# Patient Record
Sex: MALE | Race: WHITE | NOT HISPANIC OR LATINO | Employment: FULL TIME | URBAN - METROPOLITAN AREA
[De-identification: names, ages, dates, MRNs, and addresses within clinical notes are randomized per-mention and may not be internally consistent; named-entity substitution may affect disease eponyms.]

---

## 2017-09-07 ENCOUNTER — OFFICE VISIT (OUTPATIENT)
Dept: FAMILY MEDICINE | Facility: CLINIC | Age: 27
End: 2017-09-07
Payer: COMMERCIAL

## 2017-09-07 VITALS
HEART RATE: 61 BPM | HEIGHT: 72 IN | DIASTOLIC BLOOD PRESSURE: 80 MMHG | OXYGEN SATURATION: 98 % | SYSTOLIC BLOOD PRESSURE: 110 MMHG | BODY MASS INDEX: 31.72 KG/M2 | WEIGHT: 234.19 LBS

## 2017-09-07 DIAGNOSIS — Z00.00 ENCOUNTER FOR PREVENTIVE HEALTH EXAMINATION: Primary | ICD-10-CM

## 2017-09-07 PROCEDURE — 99395 PREV VISIT EST AGE 18-39: CPT | Mod: S$GLB,,,

## 2017-09-07 PROCEDURE — 99999 PR PBB SHADOW E&M-EST. PATIENT-LVL III: CPT | Mod: PBBFAC,,,

## 2017-09-07 NOTE — PROGRESS NOTES
Subjective:       Patient ID: Marshal Baer is a 27 y.o. male.    Chief Complaint: Annual Exam    HPI  View All      11 - Recommended (A, B)    Grade Title Risk Info. Details   Low  HIV: Screening - Adolescents and Adults     110/80 High Blood Pressure: Screening and Home Monitoring -- Adults     Low  Syphilis: Screening --Asymptomatic, nonpregnant adults and adolescents who are at increased risk for syphilis infection     Denies  Alcohol Misuse: Screening and Behavioral Counseling Interventions in Primary Care -- Adults     Denies  Depression: Screening -- General adult population, including pregnant and postpartum women     Plan to start  Healthful Diet and Physical Activity for CVD Disease Prevention: Counseling -- Adults with CVD Risk Factors     Low  Hepatitis B: Screening -- Nonpregnant Adolescents and Adults At High Risk     Low  Hepatitis C Virus Infection: Screening--Adults at High Risk and Adults born between 1945 and 1965     UTD negative  Latent Tuberculosis Infection: Screening -- Asymptomatic adults at increased risk for infection     Stage 1  Obesity: Screening for and Management of-- All Adults       Low  Sexually Transmitted Infections: Behavioral Counseling -- Sexually Active Adolescents and Adults         Review of Systems   Constitutional: Negative.  Negative for activity change, appetite change, fatigue and unexpected weight change.   HENT: Positive for hearing loss.         Right ear    Eyes: Negative.    Respiratory: Negative.    Cardiovascular: Negative.    Gastrointestinal: Negative.    Endocrine: Negative.    Genitourinary: Negative.    Musculoskeletal: Negative.    Skin: Negative.    Allergic/Immunologic: Negative.    Neurological: Negative.    Hematological: Negative.    Psychiatric/Behavioral: Negative.    All other systems reviewed and are negative.      Objective:      Vitals:    09/07/17 0705   BP: 110/80   Pulse: 61     Physical Exam   Constitutional: He is oriented to  person, place, and time. He appears well-developed and well-nourished. He is cooperative. No distress.   HENT:   Head: Normocephalic and atraumatic.   Right Ear: Hearing, tympanic membrane, external ear and ear canal normal.   Left Ear: Hearing, external ear and ear canal normal.   Nose: Nose normal.   Mouth/Throat: Oropharynx is clear and moist.   Eyes: Conjunctivae are normal. Pupils are equal, round, and reactive to light.   Neck: Normal range of motion. Neck supple. No thyromegaly present.   Cardiovascular: Normal rate, regular rhythm, normal heart sounds and intact distal pulses.    Pulmonary/Chest: Effort normal and breath sounds normal. No respiratory distress.   Musculoskeletal: Normal range of motion. He exhibits no edema or tenderness.   Lymphadenopathy:     He has no cervical adenopathy.   Neurological: He is alert and oriented to person, place, and time. He has normal strength. Coordination and gait normal.   Skin: Skin is warm, dry and intact. No cyanosis. Nails show no clubbing.   Psychiatric: He has a normal mood and affect. His speech is normal and behavior is normal. Judgment and thought content normal. Cognition and memory are normal.   Vitals reviewed.      Assessment:       1. Encounter for preventive health examination        Plan:       Encounter for preventive health examination      Return in about 1 year (around 9/7/2018).

## 2018-01-30 ENCOUNTER — PATIENT MESSAGE (OUTPATIENT)
Dept: ADMINISTRATIVE | Facility: OTHER | Age: 28
End: 2018-01-30

## 2018-02-03 ENCOUNTER — PATIENT MESSAGE (OUTPATIENT)
Dept: FAMILY MEDICINE | Facility: CLINIC | Age: 28
End: 2018-02-03

## 2018-02-07 ENCOUNTER — TELEPHONE (OUTPATIENT)
Dept: FAMILY MEDICINE | Facility: CLINIC | Age: 28
End: 2018-02-07

## 2018-02-07 NOTE — TELEPHONE ENCOUNTER
----- Message from Ana Gomes sent at 2/7/2018  8:18 AM CST -----  Contact: Wife. [phone]  Patient's wife would like to speak with you about the patient having a stomach bug and needs an excuse for work. Please advise

## 2018-02-27 ENCOUNTER — TELEPHONE (OUTPATIENT)
Dept: FAMILY MEDICINE | Facility: CLINIC | Age: 28
End: 2018-02-27

## 2018-02-27 NOTE — TELEPHONE ENCOUNTER
----- Message from Ana Gomes sent at 2/27/2018  8:37 AM CST -----  Contact: Self. [phone]  Patient would like to speak with you about if the paperwork he dropped off yesterday is filled out. Please advise

## 2018-07-03 ENCOUNTER — OFFICE VISIT (OUTPATIENT)
Dept: FAMILY MEDICINE | Facility: CLINIC | Age: 28
End: 2018-07-03
Payer: COMMERCIAL

## 2018-07-03 VITALS
HEART RATE: 68 BPM | TEMPERATURE: 98 F | BODY MASS INDEX: 32.57 KG/M2 | WEIGHT: 240.5 LBS | DIASTOLIC BLOOD PRESSURE: 72 MMHG | RESPIRATION RATE: 16 BRPM | SYSTOLIC BLOOD PRESSURE: 114 MMHG | OXYGEN SATURATION: 98 % | HEIGHT: 72 IN

## 2018-07-03 DIAGNOSIS — R10.11 RIGHT UPPER QUADRANT ABDOMINAL PAIN: Primary | ICD-10-CM

## 2018-07-03 DIAGNOSIS — R11.2 NON-INTRACTABLE VOMITING WITH NAUSEA, UNSPECIFIED VOMITING TYPE: ICD-10-CM

## 2018-07-03 PROCEDURE — 99214 OFFICE O/P EST MOD 30 MIN: CPT | Mod: S$GLB,,, | Performed by: INTERNAL MEDICINE

## 2018-07-03 PROCEDURE — 3008F BODY MASS INDEX DOCD: CPT | Mod: CPTII,S$GLB,, | Performed by: INTERNAL MEDICINE

## 2018-07-03 PROCEDURE — 99999 PR PBB SHADOW E&M-EST. PATIENT-LVL IV: CPT | Mod: PBBFAC,,, | Performed by: INTERNAL MEDICINE

## 2018-07-03 RX ORDER — AMOXICILLIN AND CLAVULANATE POTASSIUM 875; 125 MG/1; MG/1
1 TABLET, FILM COATED ORAL 2 TIMES DAILY
Qty: 20 TABLET | Refills: 0 | Status: SHIPPED | OUTPATIENT
Start: 2018-07-03 | End: 2018-07-10

## 2018-07-03 NOTE — PATIENT INSTRUCTIONS
I suspect that you  Might have cholecystitis. We will treat with Augmentin and get testing to further evaluate your pain. I have excused you from work until Monday so that we can finish our evaluation.

## 2018-07-03 NOTE — PROGRESS NOTES
Subjective:       Patient ID: Marshal Baer is a 28 y.o. male.    Chief Complaint: Nausea; Diarrhea; and Abdominal Pain     I have reviewed the PMH,  and  for this patient. This is a new patient to me. He presents today with N/V/D x 5 days. HE has also been having RUQ abdominal pain x 6 months. None of his family has gotten sick. He was seen in ER and treated with zofran. No labs were done. He has been able to eat some solid food but he gets sick whenever he eats.       Nausea   This is a new problem. The current episode started in the past 7 days. The problem occurs constantly. The problem has been gradually improving. Associated symptoms include abdominal pain, nausea and vomiting. Pertinent negatives include no anorexia, arthralgias, change in bowel habit, chest pain, chills, congestion, coughing, fatigue, fever, headaches, joint swelling, myalgias, neck pain, numbness, rash, sore throat, swollen glands, urinary symptoms, vertigo or visual change. Nothing aggravates the symptoms. He has tried nothing for the symptoms.   Abdominal Pain   This is a chronic problem. The current episode started more than 1 month ago. The onset quality is gradual. The problem occurs intermittently. The problem has been rapidly worsening. The pain is located in the RUQ. The pain is at a severity of 6/10. The pain is moderate. The quality of the pain is cramping. The abdominal pain does not radiate. Associated symptoms include diarrhea, nausea and vomiting. Pertinent negatives include no anorexia, arthralgias, belching, constipation, dysuria, fever, flatus, frequency, headaches, hematochezia, hematuria, melena, myalgias or weight loss. The pain is aggravated by eating. The pain is relieved by nothing. Treatments tried: zofran. The treatment provided mild relief.     Review of Systems   Constitutional: Negative for chills, fatigue, fever and weight loss.   HENT: Negative for congestion, ear discharge, ear pain, rhinorrhea  and sore throat.    Eyes: Negative for discharge, redness and itching.   Respiratory: Negative for cough, chest tightness, shortness of breath and wheezing.    Cardiovascular: Negative for chest pain, palpitations and leg swelling.   Gastrointestinal: Positive for abdominal pain, diarrhea, nausea and vomiting. Negative for anorexia, blood in stool, change in bowel habit, constipation, flatus, hematochezia and melena.   Endocrine: Negative for cold intolerance and heat intolerance.   Genitourinary: Negative for dysuria, flank pain, frequency and hematuria.   Musculoskeletal: Negative for arthralgias, back pain, joint swelling, myalgias and neck pain.   Skin: Negative for color change and rash.   Neurological: Negative for dizziness, vertigo, tremors, numbness and headaches.   Psychiatric/Behavioral: Negative for dysphoric mood and sleep disturbance. The patient is not nervous/anxious.        Objective:      Physical Exam   Constitutional: He appears well-developed and well-nourished.   HENT:   Head: Normocephalic and atraumatic.   Right Ear: External ear normal. Tympanic membrane is not erythematous. No middle ear effusion.   Left Ear: External ear normal. Tympanic membrane is not erythematous.  No middle ear effusion.   Mouth/Throat: Posterior oropharyngeal erythema present. No posterior oropharyngeal edema. No tonsillar exudate.   Eyes: Conjunctivae and EOM are normal. Pupils are equal, round, and reactive to light.   Neck: No thyromegaly present.   Cardiovascular: Normal rate, regular rhythm, normal heart sounds and intact distal pulses.    No murmur heard.  Pulmonary/Chest: Effort normal and breath sounds normal. He has no wheezes.   Abdominal: Soft. He exhibits no distension. Bowel sounds are increased. There is no tenderness.   Musculoskeletal: He exhibits no edema or tenderness.   Lymphadenopathy:     He has no cervical adenopathy.   Neurological: He displays normal reflexes. No cranial nerve deficit.   Skin:  Skin is warm and dry. No rash noted.   Psychiatric: He has a normal mood and affect. His behavior is normal.       Assessment and Plan:     Problem List Items Addressed This Visit     None      Visit Diagnoses     Right upper quadrant abdominal pain    -  Primary - We will order ultrasound and check labs for possible cholecystitis. Off work til Monday.     Relevant Orders    Comprehensive metabolic panel    CBC auto differential    Lipid panel    Amylase    US Abdomen Limited    Non-intractable vomiting with nausea, unspecified vomiting type    - Continue zofran and follow bland diet. We will check labs.       BMI 32.0-32.9,adult    - work on diet.

## 2018-07-03 NOTE — LETTER
July 3, 2018      [place] [address]  [place] [address]  Phone: [phone]  Fax: [phone]       Patient: Marshal Baer   YOB: 1990  Date of Visit: 07/03/2018    To Whom It May Concern:    Madai Baer  was at [facility] on 07/03/2018. He may return to work/school on 7/9/18 with no restrictions. If you have any questions or concerns, or if I can be of further assistance, please do not hesitate to contact me.    Sincerely,    Priscilla Turcios MD

## 2018-07-08 DIAGNOSIS — R79.89 ABNORMAL LIVER FUNCTION TEST: Primary | ICD-10-CM

## 2018-07-09 ENCOUNTER — PATIENT MESSAGE (OUTPATIENT)
Dept: FAMILY MEDICINE | Facility: CLINIC | Age: 28
End: 2018-07-09

## 2018-07-09 ENCOUNTER — TELEPHONE (OUTPATIENT)
Dept: FAMILY MEDICINE | Facility: CLINIC | Age: 28
End: 2018-07-09

## 2018-07-09 NOTE — TELEPHONE ENCOUNTER
----- Message from Priscilla Turcios MD sent at 7/8/2018 10:19 PM CDT -----  Cholesterol looks good. Blood chemistries were good, but one of the liver tests is high. We need a hepatitis panel and to repeat his liver tests since the ultrasound was normal. I have placed orders.

## 2018-07-10 ENCOUNTER — PATIENT MESSAGE (OUTPATIENT)
Dept: FAMILY MEDICINE | Facility: CLINIC | Age: 28
End: 2018-07-10

## 2018-07-10 ENCOUNTER — HOSPITAL ENCOUNTER (EMERGENCY)
Facility: HOSPITAL | Age: 28
Discharge: HOME | End: 2018-07-11
Attending: EMERGENCY MEDICINE
Payer: COMMERCIAL

## 2018-07-10 ENCOUNTER — TELEPHONE (OUTPATIENT)
Dept: FAMILY MEDICINE | Facility: CLINIC | Age: 28
End: 2018-07-10

## 2018-07-10 DIAGNOSIS — R74.8 ELEVATED LIVER ENZYMES: Primary | ICD-10-CM

## 2018-07-10 LAB
ALBUMIN SERPL BCP-MCNC: 4.4 G/DL
ALP SERPL-CCNC: 77 U/L
ALT SERPL W/O P-5'-P-CCNC: 127 U/L
ANION GAP SERPL CALC-SCNC: 9 MMOL/L
AST SERPL-CCNC: 58 U/L
BASOPHILS # BLD AUTO: 0.05 K/UL
BASOPHILS NFR BLD: 0.6 %
BILIRUB SERPL-MCNC: 0.4 MG/DL
BUN SERPL-MCNC: 13 MG/DL
CALCIUM SERPL-MCNC: 9.3 MG/DL
CHLORIDE SERPL-SCNC: 103 MMOL/L
CO2 SERPL-SCNC: 28 MMOL/L
CREAT SERPL-MCNC: 0.8 MG/DL
DIFFERENTIAL METHOD: ABNORMAL
EOSINOPHIL # BLD AUTO: 0.4 K/UL
EOSINOPHIL NFR BLD: 4.3 %
ERYTHROCYTE [DISTWIDTH] IN BLOOD BY AUTOMATED COUNT: 12.4 %
EST. GFR  (AFRICAN AMERICAN): >60 ML/MIN/1.73 M^2
EST. GFR  (NON AFRICAN AMERICAN): >60 ML/MIN/1.73 M^2
GLUCOSE SERPL-MCNC: 90 MG/DL
HCT VFR BLD AUTO: 48.9 %
HGB BLD-MCNC: 16.8 G/DL
IMM GRANULOCYTES # BLD AUTO: 0.06 K/UL
IMM GRANULOCYTES NFR BLD AUTO: 0.7 %
LYMPHOCYTES # BLD AUTO: 3.2 K/UL
LYMPHOCYTES NFR BLD: 39.9 %
MCH RBC QN AUTO: 30.1 PG
MCHC RBC AUTO-ENTMCNC: 34.4 G/DL
MCV RBC AUTO: 88 FL
MONOCYTES # BLD AUTO: 0.6 K/UL
MONOCYTES NFR BLD: 6.8 %
NEUTROPHILS # BLD AUTO: 3.9 K/UL
NEUTROPHILS NFR BLD: 47.7 %
NRBC BLD-RTO: 0 /100 WBC
PLATELET # BLD AUTO: 219 K/UL
PMV BLD AUTO: 10.9 FL
POTASSIUM SERPL-SCNC: 4.2 MMOL/L
PROT SERPL-MCNC: 7.5 G/DL
RBC # BLD AUTO: 5.59 M/UL
SODIUM SERPL-SCNC: 140 MMOL/L
WBC # BLD AUTO: 8.09 K/UL

## 2018-07-10 PROCEDURE — 85025 COMPLETE CBC W/AUTO DIFF WBC: CPT

## 2018-07-10 PROCEDURE — 99284 EMERGENCY DEPT VISIT MOD MDM: CPT | Mod: ,,, | Performed by: PHYSICIAN ASSISTANT

## 2018-07-10 PROCEDURE — 80053 COMPREHEN METABOLIC PANEL: CPT

## 2018-07-10 PROCEDURE — 99283 EMERGENCY DEPT VISIT LOW MDM: CPT

## 2018-07-10 NOTE — TELEPHONE ENCOUNTER
----- Message from Pat Guido sent at 7/10/2018  1:54 PM CDT -----  Contact: [phone]/self  Patient requesting to speak with you regarding blood work results. Please call and advise.

## 2018-07-10 NOTE — TELEPHONE ENCOUNTER
I spoke with the patient and his wife about worsening liver functions and pending hepatits panel. The LFT's have gotten worse in the last 5 days. He is not having yellowing of his eyes, diarrhea or nausea, but he is having a sharp pain in his right side. I recommend that he go to ER at [facility] since his insurance may be ending at the end of this week.

## 2018-07-11 ENCOUNTER — TELEPHONE (OUTPATIENT)
Dept: FAMILY MEDICINE | Facility: CLINIC | Age: 28
End: 2018-07-11

## 2018-07-11 VITALS
DIASTOLIC BLOOD PRESSURE: 79 MMHG | TEMPERATURE: 98 F | WEIGHT: 240 LBS | HEIGHT: 72 IN | SYSTOLIC BLOOD PRESSURE: 132 MMHG | HEART RATE: 65 BPM | OXYGEN SATURATION: 98 % | BODY MASS INDEX: 32.51 KG/M2 | RESPIRATION RATE: 19 BRPM

## 2018-07-11 NOTE — TELEPHONE ENCOUNTER
----- Message from Zohreh Tucker sent at 7/11/2018 11:19 AM CDT -----  Contact: [phone]/self  Patient is requesting a call back regarding test results.Please advise.

## 2018-07-11 NOTE — ED NOTES
LOC: The patient is awake, alert, and oriented to place, time, situation. Affect is appropriate.  Speech is appropriate and clear.     APPEARANCE: Patient resting comfortably in no acute distress.  Patient is clean and well groomed.    SKIN: The skin is warm and dry; color consistent with ethnicity.  Patient has normal skin turgor and moist mucus membranes.  Skin intact; no breakdown or bruising noted.     MUSCULOSKELETAL: Patient moving upper and lower extremities without difficulty.  Denies weakness.     RESPIRATORY: Airway is open and patent. Respirations spontaneous, even, easy, and non-labored.  Patient has a normal effort and rate.  No accessory muscle use noted. Denies cough.     CARDIAC:  Normal rate noted; pt is hypertensive.  No peripheral edema noted. No complaints of chest pain.      ABDOMEN: Soft and non tender to palpation.  No distention noted. Complains of mild pain in the right middle abdomen.    NEUROLOGIC: Eyes open spontaneously.  Behavior appropriate to situation.  Follows commands; facial expression symmetrical.  Purposeful motor response noted; normal sensation in all extremities.

## 2018-07-11 NOTE — ED PROVIDER NOTES
Encounter Date: 7/10/2018       History     Chief Complaint   Patient presents with    Abdominal Pain     nvd seen [facility] last week, had follow up with  and now elevated liver enzymes, had ultrasound of liver    Abnormal Lab     27 yo M presents to the ED per instructions of his primary care doctor for further evaluation of his elevated liver enzymes.  Patient recently had a right upper quadrant ultrasound that was unremarkable. Patient had follow-up appointment with PCP today where liver enzymes were repeated and hepatitis panel was obtained.  Hep panel is pending at this time.  Per the patient, he was instructed to come to the ED for admission to the hospital pending his hepatitis panel so that he could be treated immediately if positive. Patient reports that his insurance will drop in 3 days and will not be able to get treatment after then.  Additionally, patient recently had nausea, vomiting, diarrhea, chills for several days. He was seen in the ED on the day of symptom onset, was given antiemetics and discharged. No labs done at that time. Pt presented to his PCP after symptoms persisted for another 5 days. Labs revealed elevated liver enzymes, specifically ALT and abd US was unremarkable. Pt reports resolution of the vomiting and diarrhea. He reports mild persistent, intermittent RUQ abdominal pain, unrelated to eating.  He reports only rare alcohol use.           Review of patient's allergies indicates:   Allergen Reactions    Tetracycline Other (See Comments)     Past Medical History:   Diagnosis Date    Deafness in right ear     since birth     History reviewed. No pertinent surgical history.  Family History   Problem Relation Age of Onset    Hypertension Mother     Hypertension Father     Heart disease Paternal Uncle 42        Heart Attack in 1999    Cancer Maternal Grandmother         Breast  cancer 2006    No Known Problems Maternal Grandfather     Prostate cancer Neg Hx     Kidney  disease Neg Hx      Social History   Substance Use Topics    Smoking status: Never Smoker    Smokeless tobacco: Never Used    Alcohol use Yes      Comment: rare     Review of Systems   Constitutional: Negative for fever.   HENT: Negative for sore throat.    Respiratory: Negative for shortness of breath.    Cardiovascular: Negative for chest pain.   Gastrointestinal: Positive for abdominal pain. Negative for blood in stool, diarrhea, nausea and rectal pain.   Genitourinary: Negative for dysuria.   Musculoskeletal: Negative for back pain.   Skin: Negative for rash.   Neurological: Negative for weakness.   Hematological: Does not bruise/bleed easily.       Physical Exam     Initial Vitals [07/10/18 1849]   BP Pulse Resp Temp SpO2   (!) 146/89 68 18 98.4 °F (36.9 °C) 97 %      MAP       --         Physical Exam    Nursing note and vitals reviewed.  Constitutional: He appears well-developed and well-nourished.  Non-toxic appearance. He does not appear ill. No distress.   HENT:   Head: Normocephalic and atraumatic.   Neck: Normal range of motion. Neck supple.   Cardiovascular: Normal rate and regular rhythm. Exam reveals no gallop, no distant heart sounds and no friction rub.    No murmur heard.  Pulmonary/Chest: Effort normal and breath sounds normal. No accessory muscle usage. No tachypnea. No respiratory distress. He has no decreased breath sounds. He has no wheezes. He has no rhonchi. He has no rales.   Abdominal: He exhibits no distension.   Minimal discomfort with palpation of the RUQ   Musculoskeletal: Normal range of motion.   Neurological: He is alert.   Skin: Skin is warm and dry. No rash noted. No pallor.   Psychiatric: He has a normal mood and affect. His behavior is normal.         ED Course   Procedures  Labs Reviewed   CBC W/ AUTO DIFFERENTIAL - Abnormal; Notable for the following:        Result Value    Immature Granulocytes 0.7 (*)     Immature Grans (Abs) 0.06 (*)     All other components within  "normal limits   COMPREHENSIVE METABOLIC PANEL - Abnormal; Notable for the following:     AST 58 (*)      (*)     All other components within normal limits          Imaging Results    None          Medical Decision Making:   History:   Old Medical Records: I decided to obtain old medical records.  Differential Diagnosis:   My differential diagnosis includes but is not limited to:  Cholecystitis, hepatitis, pancreatitis, biliary colic, malignancy  Clinical Tests:   Lab Tests: Ordered and Reviewed       APC / Resident Notes:   27 yo M presented to the ED per instructions of his PCP for further evaluation of elevated liver enzymes. Pt has hepatitis panel pending at this time and is under the impression that he will be admitted pending these results so that treatment can be quickly administered if they are positive. Pt also notes that his insurance runs out in 3 day as he is changing jobs. I had a lengthy discussion with the patient during my initial evaluation that we would not likely be able to admit him for these reasons alone. His Hep panel would not likely result for another 1-2 days.  He is afebrile. He is well appearing, NAD. Vitals are normal. He had minimal TTP of the abdomen.  Blood work was repeated with slight improvement of ALT.  No other acute abnormalities. I see no indication for further emergent evaluation or admission at this time.  Pt became agitated upon discharge despite my prior discussion stating that he would go to another hospital "for better care that this".  He was stable at time of discharge.    I have reviewed the patient's records and discussed this case with my supervising physician.                   Clinical Impression:   The encounter diagnosis was Elevated liver enzymes.      Disposition:   Disposition: Discharged  Condition: Stable                        Patricia Walker PA-C  07/11/18 1147    "

## 2018-07-11 NOTE — TELEPHONE ENCOUNTER
Dr. Virk, can you help with this call please, he asking for results from ER yesterday after Dr. Turcios talked to him yesterday evening:  Priscilla Turcios MD          7/10/18 4:37 PM   Note      I spoke with the patient and his wife about worsening liver functions and pending hepatits panel. The LFT's have gotten worse in the last 5 days. He is not having yellowing of his eyes, diarrhea or nausea, but he is having a sharp pain in his right side. I recommend that he go to ER at [facility] since his insurance may be ending at the end of this week

## 2018-07-11 NOTE — ED TRIAGE NOTES
Pt sent by his primary doctor for elevated liver enzymes that continue to increase.  Pt is being evaluate for hepatitis; waiting for results.  Pt with complaints of mild pain to the right middle abdomen.

## 2018-07-11 NOTE — TELEPHONE ENCOUNTER
Called and spoke with pt. Symptoms improved and did go to ER last night. LFT stable to slightly improved. Acute hep panel negative. Liver US normal. No hepatotoxic meds. No EtOH. No family h/o liver disease expect for father with alcoholic cirrhosis. Symptoms preceded by viral gastroenteritis which could be cause. Pt without insurance 7/13-7./23 with job transition. If continues to be asymptomatic, rec repeat in 1 month. If any symptoms. Advised pt to notify us.

## 2019-02-21 ENCOUNTER — OFFICE VISIT (OUTPATIENT)
Dept: URGENT CARE | Facility: CLINIC | Age: 29
End: 2019-02-21
Payer: COMMERCIAL

## 2019-02-21 VITALS
SYSTOLIC BLOOD PRESSURE: 124 MMHG | HEART RATE: 88 BPM | TEMPERATURE: 98 F | BODY MASS INDEX: 33.86 KG/M2 | RESPIRATION RATE: 18 BRPM | OXYGEN SATURATION: 97 % | WEIGHT: 250 LBS | DIASTOLIC BLOOD PRESSURE: 71 MMHG | HEIGHT: 72 IN

## 2019-02-21 DIAGNOSIS — Z53.20 PROCEDURE NOT CARRIED OUT BECAUSE OF PATIENT'S DECISION: ICD-10-CM

## 2019-02-21 DIAGNOSIS — R05.9 COUGH: Primary | ICD-10-CM

## 2019-02-21 LAB
CTP QC/QA: YES
FLUAV AG NPH QL: NEGATIVE
FLUBV AG NPH QL: NEGATIVE

## 2019-02-21 PROCEDURE — 99499 NO LOS: ICD-10-PCS | Mod: S$GLB,,, | Performed by: EMERGENCY MEDICINE

## 2019-02-21 PROCEDURE — 87804 POCT INFLUENZA A/B: ICD-10-PCS | Mod: 59,QW,S$GLB, | Performed by: EMERGENCY MEDICINE

## 2019-02-21 PROCEDURE — 87804 INFLUENZA ASSAY W/OPTIC: CPT | Mod: 59,QW,S$GLB, | Performed by: EMERGENCY MEDICINE

## 2019-02-21 PROCEDURE — 99499 UNLISTED E&M SERVICE: CPT | Mod: S$GLB,,, | Performed by: EMERGENCY MEDICINE

## 2019-02-21 NOTE — PROGRESS NOTES
Subjective:       Patient ID: Marshal Baer is a 28 y.o. male.    Vitals:  height is 6' (1.829 m) and weight is 113.4 kg (250 lb). His oral temperature is 97.8 °F (36.6 °C). His blood pressure is 124/71 and his pulse is 88. His respiration is 18 and oxygen saturation is 97%.     Chief Complaint: Cough and Fever    Cough   This is a new problem. Episode onset: 2-3 days. The problem has been gradually worsening. The problem occurs every few minutes. The cough is productive of sputum. Associated symptoms include chills, a fever, headaches, myalgias and wheezing. Pertinent negatives include no ear pain, eye redness, hemoptysis, rash, sore throat or shortness of breath. Nothing aggravates the symptoms. Treatments tried: Nyquil. The treatment provided moderate relief. His past medical history is significant for asthma.   Fever    This is a new problem. The current episode started today. The problem occurs constantly. The problem has been unchanged. His temperature was unmeasured prior to arrival. Associated symptoms include congestion, coughing, headaches and wheezing. Pertinent negatives include no ear pain, nausea, rash, sore throat or vomiting. He has tried nothing for the symptoms.       Constitution: Positive for chills, sweating, fatigue and fever.   HENT: Positive for congestion and sinus pressure. Negative for ear pain, sinus pain, sore throat and voice change.    Neck: Negative for painful lymph nodes.   Eyes: Negative for eye redness.   Respiratory: Positive for cough, sputum production and wheezing. Negative for chest tightness, bloody sputum, COPD, shortness of breath, stridor and asthma.    Gastrointestinal: Negative for nausea and vomiting.   Musculoskeletal: Positive for muscle ache.   Skin: Negative for rash.   Allergic/Immunologic: Negative for seasonal allergies and asthma.   Neurological: Positive for headaches.   Hematologic/Lymphatic: Negative for swollen lymph nodes.       Objective:       Physical Exam    Assessment:       1. Cough        Plan:         Cough  -     POCT Influenza A/B

## 2019-02-24 ENCOUNTER — TELEPHONE (OUTPATIENT)
Dept: URGENT CARE | Facility: CLINIC | Age: 29
End: 2019-02-24

## 2019-10-22 ENCOUNTER — HOSPITAL ENCOUNTER (EMERGENCY)
Facility: HOSPITAL | Age: 29
Discharge: HOME | End: 2019-10-22
Attending: EMERGENCY MEDICINE
Payer: COMMERCIAL

## 2019-10-22 VITALS
SYSTOLIC BLOOD PRESSURE: 151 MMHG | BODY MASS INDEX: 35.21 KG/M2 | RESPIRATION RATE: 18 BRPM | OXYGEN SATURATION: 96 % | WEIGHT: 260 LBS | HEIGHT: 72 IN | DIASTOLIC BLOOD PRESSURE: 93 MMHG | HEART RATE: 73 BPM | TEMPERATURE: 98 F

## 2019-10-22 DIAGNOSIS — R10.11 RUQ PAIN: ICD-10-CM

## 2019-10-22 LAB
ALBUMIN SERPL BCP-MCNC: 4.2 G/DL (ref 3.5–5.2)
ALP SERPL-CCNC: 83 U/L (ref 55–135)
ALT SERPL W/O P-5'-P-CCNC: 73 U/L (ref 10–44)
ANION GAP SERPL CALC-SCNC: 9 MMOL/L (ref 8–16)
AST SERPL-CCNC: 38 U/L (ref 10–40)
BASOPHILS # BLD AUTO: 0.04 K/UL (ref 0–0.2)
BASOPHILS NFR BLD: 0.5 % (ref 0–1.9)
BILIRUB SERPL-MCNC: 0.4 MG/DL (ref 0.1–1)
BUN SERPL-MCNC: 14 MG/DL (ref 6–20)
CALCIUM SERPL-MCNC: 9.2 MG/DL (ref 8.7–10.5)
CHLORIDE SERPL-SCNC: 105 MMOL/L (ref 95–110)
CO2 SERPL-SCNC: 24 MMOL/L (ref 23–29)
CREAT SERPL-MCNC: 0.9 MG/DL (ref 0.5–1.4)
DIFFERENTIAL METHOD: NORMAL
EOSINOPHIL # BLD AUTO: 0.5 K/UL (ref 0–0.5)
EOSINOPHIL NFR BLD: 5.7 % (ref 0–8)
ERYTHROCYTE [DISTWIDTH] IN BLOOD BY AUTOMATED COUNT: 12.3 % (ref 11.5–14.5)
EST. GFR  (AFRICAN AMERICAN): >60 ML/MIN/1.73 M^2
EST. GFR  (NON AFRICAN AMERICAN): >60 ML/MIN/1.73 M^2
GLUCOSE SERPL-MCNC: 90 MG/DL (ref 70–110)
HCT VFR BLD AUTO: 46.8 % (ref 40–54)
HGB BLD-MCNC: 15.9 G/DL (ref 14–18)
IMM GRANULOCYTES # BLD AUTO: 0.04 K/UL (ref 0–0.04)
IMM GRANULOCYTES NFR BLD AUTO: 0.5 % (ref 0–0.5)
LIPASE SERPL-CCNC: 50 U/L (ref 4–60)
LYMPHOCYTES # BLD AUTO: 2.3 K/UL (ref 1–4.8)
LYMPHOCYTES NFR BLD: 27.9 % (ref 18–48)
MCH RBC QN AUTO: 28.7 PG (ref 27–31)
MCHC RBC AUTO-ENTMCNC: 34 G/DL (ref 32–36)
MCV RBC AUTO: 85 FL (ref 82–98)
MONOCYTES # BLD AUTO: 0.8 K/UL (ref 0.3–1)
MONOCYTES NFR BLD: 9.5 % (ref 4–15)
NEUTROPHILS # BLD AUTO: 4.6 K/UL (ref 1.8–7.7)
NEUTROPHILS NFR BLD: 55.9 % (ref 38–73)
NRBC BLD-RTO: 0 /100 WBC
PLATELET # BLD AUTO: 190 K/UL (ref 150–350)
PMV BLD AUTO: 11.1 FL (ref 9.2–12.9)
POTASSIUM SERPL-SCNC: 4 MMOL/L (ref 3.5–5.1)
PROT SERPL-MCNC: 7.4 G/DL (ref 6–8.4)
RBC # BLD AUTO: 5.54 M/UL (ref 4.6–6.2)
SODIUM SERPL-SCNC: 138 MMOL/L (ref 136–145)
WBC # BLD AUTO: 8.23 K/UL (ref 3.9–12.7)

## 2019-10-22 PROCEDURE — 99284 EMERGENCY DEPT VISIT MOD MDM: CPT | Mod: 25

## 2019-10-22 PROCEDURE — 85025 COMPLETE CBC W/AUTO DIFF WBC: CPT

## 2019-10-22 PROCEDURE — 80053 COMPREHEN METABOLIC PANEL: CPT

## 2019-10-22 PROCEDURE — 99284 PR EMERGENCY DEPT VISIT,LEVEL IV: ICD-10-PCS | Mod: ,,, | Performed by: EMERGENCY MEDICINE

## 2019-10-22 PROCEDURE — 96374 THER/PROPH/DIAG INJ IV PUSH: CPT

## 2019-10-22 PROCEDURE — 83690 ASSAY OF LIPASE: CPT

## 2019-10-22 PROCEDURE — 63600175 PHARM REV CODE 636 W HCPCS: Performed by: EMERGENCY MEDICINE

## 2019-10-22 PROCEDURE — 99284 EMERGENCY DEPT VISIT MOD MDM: CPT | Mod: ,,, | Performed by: EMERGENCY MEDICINE

## 2019-10-22 RX ORDER — KETOROLAC TROMETHAMINE 30 MG/ML
10 INJECTION, SOLUTION INTRAMUSCULAR; INTRAVENOUS
Status: COMPLETED | OUTPATIENT
Start: 2019-10-22 | End: 2019-10-22

## 2019-10-22 RX ORDER — ESCITALOPRAM OXALATE 20 MG/1
20 TABLET ORAL DAILY
COMMUNITY
End: 2020-01-31

## 2019-10-22 RX ADMIN — KETOROLAC TROMETHAMINE 10 MG: 30 INJECTION, SOLUTION INTRAMUSCULAR; INTRAVENOUS at 06:10

## 2019-10-22 NOTE — ED TRIAGE NOTES
Pt reports pain right side. Pt states it sharp pain 10/10. Pt reports having this pain for the past two years.

## 2019-10-22 NOTE — ED PROVIDER NOTES
"Encounter Date: 10/22/2019    SCRIBE #1 NOTE: I, Jeff Spain, am scribing for, and in the presence of, Dr. Candelario.       History     Chief Complaint   Patient presents with    Abdominal Pain     upper r abd pain for 2 yrs, had ultrasound in past with no answers     29 y.o. Male presents with a chief complaint of abdominal pain. Pt reports intermittent abdominal pains for the last 2 years in his RUQ. Pt states usually pain is light and he can deal with it, but last night the pain was difficult to handle. He reports the abdominal pain flare ups last about half an hour.  States certain foods bother it such as peanut butter and red sauce. Pt states if he sits up or hunches over he also feels the pain worsen, and has to walk around a bit for the pain to subside. . Pt states he had an "US" and in the past and was aware of elevated liver enzymes for which he was tested for hepatitis, but states it was negative. Denies diarrhea or changes in stool color, blood in stool, nausea vomiting, trouble breathing, denies changes in urination.         Review of patient's allergies indicates:   Allergen Reactions    Tetracycline Other (See Comments)     Past Medical History:   Diagnosis Date    Deafness in right ear     since birth     History reviewed. No pertinent surgical history.  Family History   Problem Relation Age of Onset    Hypertension Mother     Hypertension Father     Diabetes Father     Heart disease Paternal Uncle 42        Heart Attack in 1999    Cancer Maternal Grandmother         Breast  cancer 2006    No Known Problems Maternal Grandfather     Prostate cancer Neg Hx     Kidney disease Neg Hx      Social History     Tobacco Use    Smoking status: Never Smoker    Smokeless tobacco: Never Used   Substance Use Topics    Alcohol use: Yes     Comment: rare    Drug use: No     Review of Systems   Constitutional: Negative for chills, fatigue, fever and unexpected weight change.   Eyes: Negative for " visual disturbance.        Neg vision changes   Respiratory: Negative for cough and shortness of breath.    Cardiovascular: Negative for chest pain and leg swelling.   Gastrointestinal: Positive for abdominal pain. Negative for abdominal distention, anal bleeding, blood in stool, constipation, diarrhea, nausea and vomiting.        Neg changes in stool   Endocrine: Negative for polyuria.   Genitourinary: Negative for difficulty urinating, dysuria, flank pain, frequency, hematuria, penile swelling, scrotal swelling and testicular pain.        Neg changes in urination   Musculoskeletal: Negative for arthralgias, back pain, joint swelling, neck pain and neck stiffness.   Skin: Negative for pallor and rash.   Allergic/Immunologic: Negative for immunocompromised state.   Neurological: Negative for light-headedness and headaches.   Hematological: Does not bruise/bleed easily.       Physical Exam     Initial Vitals [10/22/19 1520]   BP Pulse Resp Temp SpO2   (!) 175/98 76 18 98.4 °F (36.9 °C) 96 %      MAP       --         Physical Exam    Nursing note and vitals reviewed.  Constitutional: He appears well-developed and well-nourished. He is not diaphoretic. No distress.   HENT:   Head: Normocephalic and atraumatic.   Nose: Nose normal.   Eyes: Conjunctivae and EOM are normal. Pupils are equal, round, and reactive to light. No scleral icterus.   Neck: Normal range of motion. Neck supple.   Cardiovascular: Normal rate and regular rhythm.   No murmur heard.  Pulmonary/Chest: Breath sounds normal. No respiratory distress. He has no wheezes. He has no rhonchi. He has no rales. He exhibits no tenderness.   No chest wall tenderness.    Abdominal: Soft. Bowel sounds are normal. He exhibits no distension. There is no tenderness.   Musculoskeletal: Normal range of motion. He exhibits no edema.   Neurological: He is alert and oriented to person, place, and time. He has normal strength.   Skin: Skin is warm and dry. No rash noted.   No  jaundice.   Psychiatric: He has a normal mood and affect. His behavior is normal. Thought content normal.         ED Course   Procedures  Labs Reviewed   COMPREHENSIVE METABOLIC PANEL - Abnormal; Notable for the following components:       Result Value    ALT 73 (*)     All other components within normal limits   CBC W/ AUTO DIFFERENTIAL   LIPASE          Imaging Results          US Abdomen Limited (Gallbladder) (Final result)  Result time 10/22/19 17:39:53    Final result by Vinod Mckeon MD (10/22/19 17:39:53)                 Impression:      No significant sonographic abnormality.    Electronically signed by resident: Varun Turner  Date:    10/22/2019  Time:    17:30    Electronically signed by: Vinod Mckeon MD  Date:    10/22/2019  Time:    17:39             Narrative:    EXAMINATION:  US ABDOMEN LIMITED    CLINICAL HISTORY:  Right upper quadrant pain    TECHNIQUE:  Limited ultrasound of the right upper quadrant of the abdomen (including pancreas, liver, gallbladder, common bile duct, and spleen) was performed.    COMPARISON:  Ultrasound abdomen limited 07/05/2018.    FINDINGS:  Liver: Normal in size, measuring 17.3 cm. Homogeneous echotexture. No focal hepatic lesions.    Gallbladder: No calculi, wall thickening, or pericholecystic fluid.  No sonographic Sinha's sign.    Biliary system: The common duct is not dilated, measuring 4 mm.  No intrahepatic ductal dilatation.    Spleen: The spleen is normal in size and echotexture.    Miscellaneous: No upper abdominal ascites.                                 Medical Decision Making:   History:   Old Medical Records: I decided to obtain old medical records.  Old Records Summarized: records from clinic visits and records from previous admission(s).       <> Summary of Records: Mild transaminitis on prior visit  Initial Assessment:   Pt well appearing, no abdominal tenderness to palpation.   Differential Diagnosis:   Cholelithiasis, cholecystitis, choledocholithiasis,  hepatitis, pancreatitis, functional abdominal pain, no lower abdominal pain concerning for colorectal pathology or genitourinary pathology, no shortness of breath or other pulmonary symptoms concerning for pulmonary etiology.  Clinical Tests:   Lab Tests: Ordered and Reviewed  Radiological Study: Ordered and Reviewed  ED Management:  Given prior history of transaminitis will obtain repeat CMP and lipase in audition to basic labs, and will also obtain RUQ US given concern for biliary pathology.    Pt s/o Dr. Dunaway with imaging pending.             Scribe Attestation:   Scribe #1: I performed the above scribed service and the documentation accurately describes the services I performed. I attest to the accuracy of the note.               Clinical Impression:       ICD-10-CM ICD-9-CM   1. RUQ pain R10.11 789.01                                Brigette Candelario MD  10/24/19 1112

## 2019-10-22 NOTE — ED NOTES
Patient identifiers verified and correct for Marshal Baer   Pt reports pain to RUQ   LOC: The patient is awake, alert and aware of environment with an appropriate affect, the patient is oriented x 3 and speaking appropriately.   APPEARANCE: Patient appears comfortable and in no acute distress, patient is clean and well groomed.  SKIN: The skin is warm and dry, color consistent with ethnicity, patient has normal skin turgor and moist mucus membranes, skin intact, no breakdown or bruising noted.   MUSCULOSKELETAL: Patient moving all extremities spontaneously, no swelling noted.  RESPIRATORY: Airway is open and patent, respirations are spontaneous, patient has a normal effort and rate, no accessory muscle use noted, pt placed on  pulse ox with O2 sats noted at 97% on room air.  CARDIAC: Pt placed on cardiac monitor. Patient has a normal rate and regular rhythm, no edema noted, capillary refill < 3 seconds.   GASTRO: Soft and non tender to palpation, no distention noted, normoactive bowel sounds present in all four quadrants. Pt states bowel movements have been regular.  : Pt denies any pain or frequency with urination.  NEURO: Pt opens eyes spontaneously, behavior appropriate to situation, follows commands, facial expression symmetrical, bilateral hand grasp equal and even, purposeful motor response noted, normal sensation in all extremities when touched with a finger.

## 2019-10-22 NOTE — DISCHARGE INSTRUCTIONS
Your labs, ultrasound not show any signs of dangerous medical conditions explaining your abdominal pain.    Your liver enzymes are improved from previous, family very mild elevation in ALT.    Please follow-up with your doctor within 1 week for continued evaluation of your symptoms.    If you develop any fevers, worsening abdominal pain, jaundice, or any other new, worsening or worrisome symptoms please return to the emergency department for re-evaluation.    Your blood pressure was elevated today please follow up with her PMD for a repeat blood pressure check.

## 2019-10-22 NOTE — PROVIDER PROGRESS NOTES - EMERGENCY DEPT.
Signed out to me Encounter Date: 10/22/2019    ED Physician Progress Notes        Physician Note:   Signed out to me.  Here with right upper quadrant pain, history of mildly elevated liver enzymes.  Recent hep panel was negative.  Labs today are unremarkable with only a mildly elevated ALT.  Ultrasound is negative.  No signs of dangerous medical conditions at this time.  Plan for discharge home with PMD follow-up and return precautions.

## 2019-10-23 ENCOUNTER — PATIENT MESSAGE (OUTPATIENT)
Dept: UROLOGY | Facility: CLINIC | Age: 29
End: 2019-10-23

## 2019-10-23 ENCOUNTER — TELEPHONE (OUTPATIENT)
Dept: UROLOGY | Facility: CLINIC | Age: 29
End: 2019-10-23

## 2019-10-23 NOTE — TELEPHONE ENCOUNTER
----- Message from Rhoda Elizabeth sent at 10/23/2019 10:14 AM CDT -----  Contact: Pt  Pt is requesting a callback from office needing to know why his appt was r/s'd from 11/5/2019    Pt can be reached at [phone]

## 2019-10-27 ENCOUNTER — NURSE TRIAGE (OUTPATIENT)
Dept: ADMINISTRATIVE | Facility: CLINIC | Age: 29
End: 2019-10-27

## 2019-10-28 NOTE — TELEPHONE ENCOUNTER
"Reason for call: severe abdomen  pain rt upper quadrant    Reason for Disposition   Pain is mainly in upper abdomen  (if needed ask: "is it mainly above the belly button?")   [1] SEVERE pain (e.g., excruciating) AND [2] present > 1 hour    Additional Information   Negative: Chest pain   Negative: Severe difficulty breathing (e.g., struggling for each breath, speaks in single words)   Negative: Shock suspected (e.g., cold/pale/clammy skin, too weak to stand, low BP, rapid pulse)   Negative: Difficult to awaken or acting confused (e.g., disoriented, slurred speech)   Negative: Passed out (i.e., lost consciousness, collapsed and was not responding)   Negative: Visible sweat on face or sweat dripping down face   Negative: Sounds like a life-threatening emergency to the triager   Negative: Followed an abdomen (stomach) injury   Negative: Chest pain    Protocols used: ABDOMINAL PAIN - MALE-A-AH, ABDOMINAL PAIN - UPPER-A-AH      "

## 2019-10-29 ENCOUNTER — HOSPITAL ENCOUNTER (EMERGENCY)
Facility: HOSPITAL | Age: 29
Discharge: HOME | End: 2019-10-30
Attending: EMERGENCY MEDICINE
Payer: COMMERCIAL

## 2019-10-29 DIAGNOSIS — R10.9 RIGHT FLANK PAIN: ICD-10-CM

## 2019-10-29 DIAGNOSIS — R10.11 RIGHT UPPER QUADRANT ABDOMINAL PAIN: Primary | ICD-10-CM

## 2019-10-29 LAB
ALBUMIN SERPL BCP-MCNC: 4.2 G/DL (ref 3.5–5.2)
ALP SERPL-CCNC: 83 U/L (ref 55–135)
ALT SERPL W/O P-5'-P-CCNC: 54 U/L (ref 10–44)
ANION GAP SERPL CALC-SCNC: 8 MMOL/L (ref 8–16)
AST SERPL-CCNC: 35 U/L (ref 10–40)
BASOPHILS # BLD AUTO: 0.05 K/UL (ref 0–0.2)
BASOPHILS NFR BLD: 0.6 % (ref 0–1.9)
BILIRUB SERPL-MCNC: 0.4 MG/DL (ref 0.1–1)
BUN SERPL-MCNC: 12 MG/DL (ref 6–20)
CALCIUM SERPL-MCNC: 9.2 MG/DL (ref 8.7–10.5)
CHLORIDE SERPL-SCNC: 105 MMOL/L (ref 95–110)
CO2 SERPL-SCNC: 27 MMOL/L (ref 23–29)
CREAT SERPL-MCNC: 0.8 MG/DL (ref 0.5–1.4)
DIFFERENTIAL METHOD: NORMAL
EOSINOPHIL # BLD AUTO: 0.5 K/UL (ref 0–0.5)
EOSINOPHIL NFR BLD: 5.9 % (ref 0–8)
ERYTHROCYTE [DISTWIDTH] IN BLOOD BY AUTOMATED COUNT: 12.3 % (ref 11.5–14.5)
EST. GFR  (AFRICAN AMERICAN): >60 ML/MIN/1.73 M^2
EST. GFR  (NON AFRICAN AMERICAN): >60 ML/MIN/1.73 M^2
GLUCOSE SERPL-MCNC: 101 MG/DL (ref 70–110)
HCT VFR BLD AUTO: 46 % (ref 40–54)
HGB BLD-MCNC: 15.7 G/DL (ref 14–18)
IMM GRANULOCYTES # BLD AUTO: 0.04 K/UL (ref 0–0.04)
IMM GRANULOCYTES NFR BLD AUTO: 0.5 % (ref 0–0.5)
LIPASE SERPL-CCNC: 43 U/L (ref 4–60)
LYMPHOCYTES # BLD AUTO: 2.4 K/UL (ref 1–4.8)
LYMPHOCYTES NFR BLD: 31 % (ref 18–48)
MCH RBC QN AUTO: 28.5 PG (ref 27–31)
MCHC RBC AUTO-ENTMCNC: 34.1 G/DL (ref 32–36)
MCV RBC AUTO: 84 FL (ref 82–98)
MONOCYTES # BLD AUTO: 0.7 K/UL (ref 0.3–1)
MONOCYTES NFR BLD: 9.4 % (ref 4–15)
NEUTROPHILS # BLD AUTO: 4.1 K/UL (ref 1.8–7.7)
NEUTROPHILS NFR BLD: 52.6 % (ref 38–73)
NRBC BLD-RTO: 0 /100 WBC
PLATELET # BLD AUTO: 189 K/UL (ref 150–350)
PMV BLD AUTO: 10.5 FL (ref 9.2–12.9)
POTASSIUM SERPL-SCNC: 3.7 MMOL/L (ref 3.5–5.1)
PROT SERPL-MCNC: 7.3 G/DL (ref 6–8.4)
RBC # BLD AUTO: 5.5 M/UL (ref 4.6–6.2)
SODIUM SERPL-SCNC: 140 MMOL/L (ref 136–145)
WBC # BLD AUTO: 7.74 K/UL (ref 3.9–12.7)

## 2019-10-29 PROCEDURE — 25000003 PHARM REV CODE 250: Performed by: EMERGENCY MEDICINE

## 2019-10-29 PROCEDURE — 99284 PR EMERGENCY DEPT VISIT,LEVEL IV: ICD-10-PCS | Mod: ,,, | Performed by: EMERGENCY MEDICINE

## 2019-10-29 PROCEDURE — 83690 ASSAY OF LIPASE: CPT

## 2019-10-29 PROCEDURE — 80053 COMPREHEN METABOLIC PANEL: CPT

## 2019-10-29 PROCEDURE — 85025 COMPLETE CBC W/AUTO DIFF WBC: CPT

## 2019-10-29 PROCEDURE — 99284 EMERGENCY DEPT VISIT MOD MDM: CPT | Mod: ,,, | Performed by: EMERGENCY MEDICINE

## 2019-10-29 PROCEDURE — 99285 EMERGENCY DEPT VISIT HI MDM: CPT

## 2019-10-29 RX ORDER — FAMOTIDINE 20 MG/1
20 TABLET, FILM COATED ORAL
Status: COMPLETED | OUTPATIENT
Start: 2019-10-29 | End: 2019-10-29

## 2019-10-29 RX ORDER — MAG HYDROX/ALUMINUM HYD/SIMETH 200-200-20
5 SUSPENSION, ORAL (FINAL DOSE FORM) ORAL
Status: COMPLETED | OUTPATIENT
Start: 2019-10-29 | End: 2019-10-29

## 2019-10-29 RX ADMIN — ALUMINUM HYDROXIDE, MAGNESIUM HYDROXIDE, AND SIMETHICONE 5 ML: 200; 200; 20 SUSPENSION ORAL at 10:10

## 2019-10-29 RX ADMIN — FAMOTIDINE 20 MG: 20 TABLET, FILM COATED ORAL at 10:10

## 2019-10-30 VITALS
WEIGHT: 264 LBS | SYSTOLIC BLOOD PRESSURE: 141 MMHG | RESPIRATION RATE: 18 BRPM | BODY MASS INDEX: 35.76 KG/M2 | HEART RATE: 63 BPM | DIASTOLIC BLOOD PRESSURE: 88 MMHG | OXYGEN SATURATION: 98 % | HEIGHT: 72 IN | TEMPERATURE: 99 F

## 2019-10-30 LAB
BILIRUB UR QL STRIP: NEGATIVE
CLARITY UR REFRACT.AUTO: CLEAR
COLOR UR AUTO: NORMAL
GLUCOSE UR QL STRIP: NEGATIVE
HGB UR QL STRIP: NEGATIVE
KETONES UR QL STRIP: NEGATIVE
LEUKOCYTE ESTERASE UR QL STRIP: NEGATIVE
NITRITE UR QL STRIP: NEGATIVE
PH UR STRIP: 7 [PH] (ref 5–8)
PROT UR QL STRIP: NEGATIVE
SP GR UR STRIP: 1.01 (ref 1–1.03)
URN SPEC COLLECT METH UR: NORMAL

## 2019-10-30 PROCEDURE — 25000003 PHARM REV CODE 250: Performed by: EMERGENCY MEDICINE

## 2019-10-30 PROCEDURE — 25500020 PHARM REV CODE 255: Performed by: EMERGENCY MEDICINE

## 2019-10-30 PROCEDURE — 81003 URINALYSIS AUTO W/O SCOPE: CPT

## 2019-10-30 RX ORDER — OXYCODONE AND ACETAMINOPHEN 5; 325 MG/1; MG/1
1 TABLET ORAL
Status: COMPLETED | OUTPATIENT
Start: 2019-10-30 | End: 2019-10-30

## 2019-10-30 RX ORDER — KETOROLAC TROMETHAMINE 30 MG/ML
15 INJECTION, SOLUTION INTRAMUSCULAR; INTRAVENOUS
Status: DISCONTINUED | OUTPATIENT
Start: 2019-10-30 | End: 2019-10-30 | Stop reason: HOSPADM

## 2019-10-30 RX ADMIN — IOHEXOL 100 ML: 350 INJECTION, SOLUTION INTRAVENOUS at 01:10

## 2019-10-30 RX ADMIN — OXYCODONE HYDROCHLORIDE AND ACETAMINOPHEN 1 TABLET: 5; 325 TABLET ORAL at 01:10

## 2019-10-30 NOTE — PROVIDER PROGRESS NOTES - EMERGENCY DEPT.
Encounter Date: 10/29/2019 1:22 AM    ED Physician Progress Notes           ED Course: I, Candice Quijano PA-C, have assumed care of this patient from Dr. Burgos. Patient is a 29 year old male with no significant PMHX. He presents to the ED for abdominal pain.     At the time of signout plan was pending CT abdomen pelvis. Anticipate discharge home.     Medications given in the ED:    Medications   ketorolac injection 15 mg (15 mg Intravenous Not Given 10/30/19 0100)   aluminum-magnesium hydroxide-simethicone 200-200-20 mg/5 mL suspension 5 mL (5 mLs Oral Given 10/29/19 2253)   famotidine tablet 20 mg (20 mg Oral Given 10/29/19 2253)   iohexol (OMNIPAQUE 350) injection 100 mL (100 mLs Intravenous Given 10/30/19 0100)   oxyCODONE-acetaminophen 5-325 mg per tablet 1 tablet (1 tablet Oral Given 10/30/19 0116)     CT abdomen pelvis found to have no acute abnormality.     Patient reassessed, reports symptoms improved with ED management. I have discussed emergency department findings, and plan with the patient. Will discharge home with F/U with GI and PCP. Patient verbalizes understanding of plan and agrees. Return precautions given.     Disposition: Discharged.     Impression: RUQ abdominal pain.     I have discussed and reviewed with my supervising physician.

## 2019-10-30 NOTE — ED PROVIDER NOTES
Encounter Date: 10/29/2019    SCRIBE #1 NOTE: I, Blake Brown, am scribing for, and in the presence of,  Dr. Burgos. I have scribed the following portions of the note - Other sections scribed: HPI, ROS, PE, MDM.       History     Chief Complaint   Patient presents with    Abdominal Pain     Pt c/o RUQ abdominal pain, N/V/D. Pt was seen here last week for same complaint and reports worsening of sx. Pt reports clear workup last visit.      Patient is a 29 year old male presenting with abdominal pain in RUQ intermittently for past 1.5 years made worse over past 2 days. Pain in right upper quadrant ws nausea, vomiting and diarrhea. Pain began two days ago while the nausea, vomiting and diarrhea began today. Currently is in pain that is rated as an 8. Radiating from RUQ to right flank.  Eating causes flare ups in pain w/ no specific types of food groups, no alleviating factors. Tried to take Toradol, Ibuprofen, and Pepto Bismol, but they did not help. Pt was seen in this ED earlier this month with similar symptoms,  Had  RUQ sono done and labs done only noted for mild transamanitis US unremarkable.. Seen by PCP yesterday and prescribed sucrulfate and referred to GI. Has appointment scheduled for GI this week however pt mentions he is unable to take the pain.   Denies any rceent f/chills/ night sweats/ no recent sick contacts or travel. Has not seen a GI doctor in past no Hx of endoscopies or colonoscopies.     The history is provided by the patient.     Review of patient's allergies indicates:   Allergen Reactions    Tetracycline Other (See Comments)     Past Medical History:   Diagnosis Date    Deafness in right ear     since birth     No past surgical history on file.  Family History   Problem Relation Age of Onset    Hypertension Mother     Hypertension Father     Diabetes Father     Heart disease Paternal Uncle 42        Heart Attack in 1999    Cancer Maternal Grandmother         Breast  cancer 2006    No  Known Problems Maternal Grandfather     Prostate cancer Neg Hx     Kidney disease Neg Hx      Social History     Tobacco Use    Smoking status: Never Smoker    Smokeless tobacco: Never Used   Substance Use Topics    Alcohol use: Yes     Comment: rare    Drug use: No     Review of Systems   Constitutional: Negative for chills and fever.   HENT: Negative for sore throat and trouble swallowing.    Eyes: Negative for pain and redness.   Respiratory: Negative for cough and shortness of breath.    Gastrointestinal: Positive for abdominal pain (RUQ), diarrhea, nausea and vomiting. Negative for blood in stool.   Genitourinary: Negative for dysuria and hematuria.   Musculoskeletal: Negative for back pain and neck pain.   Neurological: Negative for light-headedness and headaches.   Psychiatric/Behavioral: Negative for behavioral problems and confusion.   All other systems reviewed and are negative.      Physical Exam     Initial Vitals [10/29/19 2157]   BP Pulse Resp Temp SpO2   137/88 71 16 97.4 °F (36.3 °C) 98 %      MAP       --         Physical Exam    Nursing note and vitals reviewed.  Constitutional: He appears well-developed and well-nourished. No distress.   HENT:   Head: Normocephalic and atraumatic.   Eyes: EOM are normal. Pupils are equal, round, and reactive to light.   Cardiovascular: Normal rate, regular rhythm, normal heart sounds and intact distal pulses.   Pulmonary/Chest: Effort normal and breath sounds normal.   Abdominal: Soft. Normal appearance and bowel sounds are normal. There is no hepatosplenomegaly. There is no rigidity, no rebound, no guarding, no CVA tenderness and negative Sinha's sign.   Neurological: He is alert and oriented to person, place, and time.   Skin: Skin is warm and dry. Capillary refill takes less than 2 seconds.         ED Course   Procedures  Labs Reviewed   COMPREHENSIVE METABOLIC PANEL - Abnormal; Notable for the following components:       Result Value    ALT 54 (*)      All other components within normal limits   LIPASE   CBC W/ AUTO DIFFERENTIAL   URINALYSIS, REFLEX TO URINE CULTURE    Narrative:     Preferred Collection Type->Urine, Clean Catch          Imaging Results          CT Abdomen Pelvis With Contrast (Final result)  Result time 10/30/19 01:28:12    Final result by Bryson Zambrano MD (10/30/19 01:28:12)                 Impression:      No acute abnormality identified to explain the patient's abdominal pain.    Electronically signed by resident: Caitlin Caballero  Date:    10/30/2019  Time:    01:13    Electronically signed by: Bryson Zambrano MD  Date:    10/30/2019  Time:    01:28             Narrative:    EXAMINATION:  CT ABDOMEN PELVIS WITH CONTRAST    CLINICAL HISTORY:  Nausea, vomiting, diarrhea;    TECHNIQUE:  Low dose axial images, sagittal and coronal reformations were obtained from the lung bases to the pubic symphysis following the IV administration of 100 mL of Omnipaque 350.  Oral contrast was not administered.    COMPARISON:  Ultrasound abdomen limited 10/22/2019.    FINDINGS:  Heart: Normal size. No effusion.    Lung Bases: Symmetrically expanded without focal mass or consolidation.  No pneumothorax or pleural effusion.    Liver: Normal size and attenuation. No focal lesions.    Gallbladder: No calcified gallstones.    Bile Ducts: No intra or extrahepatic ductal dilatation.    Pancreas: No mass. No peripancreatic fat stranding.    Spleen: Unremarkable.    Adrenals: Unremarkable.    Kidneys/Ureters: Normal in size and location.  Normal enhancement.  No focal mass.  No nephrolithiasis.  Ureters are normal in course and caliber.  No hydroureteronephrosis.    Bladder: No wall thickening.    Reproductive organs: Prostate is unremarkable.    GI Tract/Mesentery: Stomach is unremarkable.  No small bowel obstruction.  The appendix is normal.  No inflammatory changes identified in the GI tract.    Peritoneal Space: No ascites or free air.    Retroperitoneum: No  significant adenopathy.    Abdominal wall: Normal.    Vasculature: No significant atherosclerotic calcification.  No aneurysmal dilatation.    Bones: No acute fracture. No suspicious lytic or sclerotic lesions.                                 Medical Decision Making:   History:   Old Medical Records: I decided to obtain old medical records.  Initial Assessment:   29 year old male presenting with abdominal pain intermittently for 1.5 years made worse over past 2 days. VSSWNL. PE pt comfortbale appearing, not abd TTP, - harper's sign, abd benign. No CVA TTP. PT in appears to be in NAD.    Differential Diagnosis:   Ddx includes PUD, gastritis, colitis, UTI, IBS, GERD, pancreatitis, biliary disease, UTI, hepatitis  Clinical Tests:   Lab Tests: Ordered and Reviewed  Radiological Study: Ordered and Reviewed  ED Management:  Plan: will obtain cbc, cmp, bedside sono, GI cocktail and reassess.     During stay pt complaining of 10/10 in flank and on abd, pt appears comfortable, bedside sono showing no evidence of gallstones or cholecystitis. Considering flank pain , negative recent sono as well, obtained CT abd/pelvis for r/o of kidneys disease, or further intraabdominal disease.             Scribe Attestation:   Scribe #1: I performed the above scribed service and the documentation accurately describes the services I performed. I attest to the accuracy of the note.            ED Course as of Oct 30 1300   Tue Oct 29, 2019   2321 Patient mentions having worsening pain since being in the ED.  Abdomen continues to be benign no CVA tenderness although mentions pain is radiating to the right side.    [DC]   Wed Oct 30, 2019   0122 Patient still mention having 10/10 pain initially attempt to give Toradol over patient mentions had before and is not effective will give single Percocet awaiting CT results reassess.    [DC]      ED Course User Index  [DC] Niko Burgos Jr., MD     Clinical Impression:       ICD-10-CM ICD-9-CM   1.  Right upper quadrant abdominal pain R10.11 789.01   2. Right flank pain R10.9 789.09                                Niko Burgos Jr., MD  10/30/19 6426

## 2019-10-30 NOTE — ED TRIAGE NOTES
"Marshal Baer, a 29 y.o. male presents to the ED by POV w/ complaint of RUQ pain that radiates to the R flank area into back. Describes pain as "it feels like someone is stabbing me in the stomach".  Reports new onset N/V and diarrhea that presented today. Reports x1 episode of emesis. Pt states he was seen here for the same symptoms last week, states ultrasound was negative and dc home. Pt states symptoms worsening past x2 days. States pain last in 2 hour increments. Rates pain 10/10. Pt reports taking Toradol that he was dc home with, with no pain relief.     Triage note:  Chief Complaint   Patient presents with    Abdominal Pain     Pt c/o RUQ abdominal pain, N/V/D. Pt was seen here last week for same complaint and reports worsening of sx. Pt reports clear workup last visit.      Review of patient's allergies indicates:   Allergen Reactions    Tetracycline Other (See Comments)     Past Medical History:   Diagnosis Date    Deafness in right ear     since birth     "

## 2019-11-05 ENCOUNTER — OFFICE VISIT (OUTPATIENT)
Dept: UROLOGY | Facility: CLINIC | Age: 29
End: 2019-11-05
Payer: COMMERCIAL

## 2019-11-05 VITALS
DIASTOLIC BLOOD PRESSURE: 80 MMHG | TEMPERATURE: 98 F | RESPIRATION RATE: 18 BRPM | BODY MASS INDEX: 35.76 KG/M2 | SYSTOLIC BLOOD PRESSURE: 118 MMHG | HEART RATE: 72 BPM | HEIGHT: 72 IN | OXYGEN SATURATION: 99 % | WEIGHT: 264 LBS

## 2019-11-05 DIAGNOSIS — Z30.09 STERILIZATION CONSULT: Primary | ICD-10-CM

## 2019-11-05 LAB
BILIRUB SERPL-MCNC: NORMAL MG/DL
BLOOD URINE, POC: NORMAL
COLOR, POC UA: YELLOW
GLUCOSE UR QL STRIP: NORMAL
KETONES UR QL STRIP: NORMAL
LEUKOCYTE ESTERASE URINE, POC: NORMAL
NITRITE, POC UA: NORMAL
PH, POC UA: 5
PROTEIN, POC: NORMAL
SPECIFIC GRAVITY, POC UA: 1.03
UROBILINOGEN, POC UA: 0.2

## 2019-11-05 PROCEDURE — 99999 PR PBB SHADOW E&M-EST. PATIENT-LVL IV: CPT | Mod: PBBFAC,,, | Performed by: NURSE PRACTITIONER

## 2019-11-05 PROCEDURE — 81002 POCT URINE DIPSTICK WITHOUT MICROSCOPE: ICD-10-PCS | Mod: S$GLB,,, | Performed by: NURSE PRACTITIONER

## 2019-11-05 PROCEDURE — 81002 URINALYSIS NONAUTO W/O SCOPE: CPT | Mod: S$GLB,,, | Performed by: NURSE PRACTITIONER

## 2019-11-05 PROCEDURE — 87086 URINE CULTURE/COLONY COUNT: CPT

## 2019-11-05 PROCEDURE — 99999 PR PBB SHADOW E&M-EST. PATIENT-LVL IV: ICD-10-PCS | Mod: PBBFAC,,, | Performed by: NURSE PRACTITIONER

## 2019-11-05 PROCEDURE — 99202 PR OFFICE/OUTPT VISIT, NEW, LEVL II, 15-29 MIN: ICD-10-PCS | Mod: 25,S$GLB,, | Performed by: NURSE PRACTITIONER

## 2019-11-05 PROCEDURE — 99202 OFFICE O/P NEW SF 15 MIN: CPT | Mod: 25,S$GLB,, | Performed by: NURSE PRACTITIONER

## 2019-11-05 RX ORDER — SUCRALFATE 1 G/1
TABLET ORAL
Refills: 0 | COMMUNITY
Start: 2019-10-28 | End: 2019-12-27

## 2019-11-05 NOTE — PROGRESS NOTES
Subjective:       Patient ID: Marshal Baer is a 29 y.o. male.    Chief Complaint: Other (discuss vasectomy)    Patient is new to me. He is a 30 yo WM who is here today for a consultation for a vasectomy. He has a 4 month old daughter and 2 year old son. Him and his wife Chloe (who is present during visit) decided together that he should have a vasectomy for extra precaution. His wife had a tubal ligation. He is aware that the vasectomy is irreversible. Vasectomy procedure and post-procedure discussed with patient and his wife. They verbalized understanding.     Review of Systems   Constitutional: Negative for appetite change, chills, fatigue and fever.   Gastrointestinal: Positive for abdominal pain (RUQ), diarrhea, nausea and vomiting. Negative for constipation.   Genitourinary: Negative for decreased urine volume, difficulty urinating, discharge, dysuria, flank pain, frequency, hematuria, penile pain, penile swelling, scrotal swelling, testicular pain and urgency.   Neurological: Negative for dizziness and headaches.   Psychiatric/Behavioral: Negative.        Objective:      Physical Exam   Constitutional: He is oriented to person, place, and time. No distress.   Obese   HENT:   Head: Normocephalic and atraumatic.   Eyes: Pupils are equal, round, and reactive to light. EOM are normal.   Neck: Normal range of motion.   Cardiovascular: Normal rate.   Pulmonary/Chest: Effort normal. No respiratory distress.   Abdominal: Soft. There is no tenderness.   Musculoskeletal: Normal range of motion. He exhibits no edema.   Neurological: He is alert and oriented to person, place, and time. Coordination normal.   Skin: Skin is warm and dry.   Psychiatric: He has a normal mood and affect. His behavior is normal. Judgment and thought content normal.   Nursing note and vitals reviewed.      Assessment:       1. Sterilization consult        Plan:       Marshal Callaway was seen today for other.    Diagnoses and all orders  for this visit:    Sterilization consult  -     POCT URINE DIPSTICK WITHOUT MICROSCOPE  -     Urine culture    Other order  1. Schedule in-clinic vasectomy with Dr. Swann for Friday, 12/27/19.     Follow-up post-op.    Delvis Corey NP

## 2019-11-05 NOTE — PATIENT INSTRUCTIONS
1. Urine dipstick  2. Urine cx   3. Schedule in-clinic vasectomy with Dr. Swann for Friday, 12/27/19.   4. Follow-up post-op.

## 2019-11-07 ENCOUNTER — TELEPHONE (OUTPATIENT)
Dept: UROLOGY | Facility: CLINIC | Age: 29
End: 2019-11-07

## 2019-11-07 LAB — BACTERIA UR CULT: NO GROWTH

## 2019-11-07 NOTE — TELEPHONE ENCOUNTER
----- Message from Delvis Corey NP sent at 11/7/2019  9:08 AM CST -----  Please inform patient his urine cx was normal. He does not have a UTI.

## 2019-12-20 RX ORDER — DIAZEPAM 10 MG/1
10 TABLET ORAL
Qty: 1 TABLET | Refills: 0 | Status: SHIPPED | OUTPATIENT
Start: 2019-12-20 | End: 2020-01-30

## 2019-12-20 RX ORDER — HYDROCODONE BITARTRATE AND ACETAMINOPHEN 5; 325 MG/1; MG/1
1 TABLET ORAL EVERY 6 HOURS PRN
Qty: 20 TABLET | Refills: 0 | Status: SHIPPED | OUTPATIENT
Start: 2019-12-20 | End: 2019-12-27

## 2019-12-20 RX ORDER — CIPROFLOXACIN 500 MG/1
500 TABLET ORAL 2 TIMES DAILY
Qty: 10 TABLET | Refills: 0 | Status: SHIPPED | OUTPATIENT
Start: 2019-12-20 | End: 2019-12-25

## 2019-12-26 DIAGNOSIS — Z98.52 STATUS POST VASECTOMY: Primary | ICD-10-CM

## 2019-12-27 ENCOUNTER — PROCEDURE VISIT (OUTPATIENT)
Dept: UROLOGY | Facility: CLINIC | Age: 29
End: 2019-12-27
Payer: COMMERCIAL

## 2019-12-27 VITALS
BODY MASS INDEX: 35.76 KG/M2 | OXYGEN SATURATION: 99 % | HEIGHT: 72 IN | TEMPERATURE: 98 F | WEIGHT: 264 LBS | DIASTOLIC BLOOD PRESSURE: 62 MMHG | SYSTOLIC BLOOD PRESSURE: 112 MMHG | RESPIRATION RATE: 18 BRPM | HEART RATE: 103 BPM

## 2019-12-27 DIAGNOSIS — Z30.2 ENCOUNTER FOR STERILIZATION: Primary | ICD-10-CM

## 2019-12-27 PROCEDURE — 55250 REMOVAL OF SPERM DUCT(S): CPT | Mod: S$GLB,,, | Performed by: UROLOGY

## 2019-12-27 PROCEDURE — 55250 VASECTOMY: ICD-10-PCS | Mod: S$GLB,,, | Performed by: UROLOGY

## 2019-12-27 RX ORDER — PANTOPRAZOLE SODIUM 40 MG/1
TABLET, DELAYED RELEASE ORAL
COMMUNITY
End: 2020-12-11 | Stop reason: SDUPTHER

## 2019-12-27 RX ORDER — AZELASTINE 1 MG/ML
SPRAY, METERED NASAL
COMMUNITY
End: 2022-08-01

## 2019-12-27 RX ORDER — LEVOCETIRIZINE DIHYDROCHLORIDE 5 MG/1
TABLET, FILM COATED ORAL
COMMUNITY
End: 2020-01-31 | Stop reason: SDUPTHER

## 2019-12-30 PROBLEM — Z30.2 ENCOUNTER FOR STERILIZATION: Status: ACTIVE | Noted: 2019-12-30

## 2019-12-30 NOTE — PROCEDURES
"Vasectomy  Date/Time: 12/27/2019 9:30 AM  Performed by: Alfonso Swann MD  Authorized by: Alfonso Swann MD     Consent Done?:  Yes (Written)  Time out: Immediately prior to procedure a "time out" was called to verify the correct patient, procedure, equipment, support staff and site/side marked as required.    Indications:  [place] male  Position:  Other (supine)  Anesthesia:  10 cc 2% Lidocaine  Patient sedated: Yes    Sedation:  Other (10 mg Valium)  Preparation: Patient was prepped and draped in usual sterile fashion    Incisions:  Scalpel-less and 1  Length vas excised:  2.5 cm  Vas:  Fulgurated and Tied  Sutures:  3-0 chromic SH  Procedure details - Skin closures: open.  Same procedure performed on both sides    Patient tolerance:  Patient tolerated the procedure well with no immediate complications      "

## 2020-01-30 PROBLEM — K21.9 GASTROESOPHAGEAL REFLUX DISEASE WITHOUT ESOPHAGITIS: Status: ACTIVE | Noted: 2020-01-30

## 2020-01-30 PROBLEM — F41.9 ANXIETY: Status: ACTIVE | Noted: 2020-01-30

## 2020-01-30 PROBLEM — Z30.2 ENCOUNTER FOR STERILIZATION: Status: RESOLVED | Noted: 2019-12-30 | Resolved: 2020-01-30

## 2020-01-30 NOTE — PROGRESS NOTES
"FAMILY MEDICINE    Patient Active Problem List   Diagnosis    Anxiety    Gastroesophageal reflux disease without esophagitis    Chronic rhinitis    Intermittent explosive disorder       CC:   Chief Complaint   Patient presents with    Anxiety       HPI: Marshal Baer is a 29 y.o. male  - with anxiety presents to establish care and concerns for anxiety and depression. Last PCP Dr. Turcios    He is here today with his wife and 6 month old daughter.    1. Anxiety  - and both he and wife note that he has a "bad temper." He has never hurt or struck anyone including her or their children but he will easily lose his temper and often yell, punch a wall, and throw things  - his parents have a similar behavior when they argue and though he is aware that it is not appropriate, he has found it difficult to adjust his behavior. He has looked into finding a counselor but the wait list was very long and he was unable to get in to speak with anyone  - he denies depression, thoughts of self harm or harm to others    Age diagnosed: since teenage years   - 2008 anger management classes    Psychiatrist: none  Psychologist: none  Counselor : none    Prior treatments: escitalopram oxalate (LEXAPRO) 20 MG tablet, Take 20 mg by mouth once daily., Disp: , Rfl:  - stopped about 3 weeks ago and reports that was not effects  - side effects impotence but he and wife report that it was tolerable and more concerned that medication did not help  Side effects of prior treatment: see above    Current treatment:   None and was on Lexapro     Side effects of current treatment: see above    Symptoms related: see above  Panic attacks: denies    Hopelessness: denies  Sleep: reports good sleep but wife reports that in the evening he seems to most irritable  Suicidal thoughts: denies  Thoughts of self harm:denies  Thoughts of harm to others: denies  History of suicide attempts: denies  Family history of suicide:denies    Support system: wife " and friends            HEALTH MAINTENANCE:   Health Maintenance   Topic Date Due    TETANUS VACCINE  06/20/2027    Lipid Panel  Completed       ROS: Review of Systems   Constitutional: Negative.    HENT: Negative.    Eyes: Negative.    Respiratory: Negative.    Cardiovascular: Negative.    Gastrointestinal: Negative.    Endocrine: Negative.    Genitourinary: Negative.    Musculoskeletal: Negative.    Skin: Negative.    Allergic/Immunologic: Negative.    Neurological: Negative.    Hematological: Negative.    Psychiatric/Behavioral: Positive for agitation, behavioral problems and sleep disturbance. Negative for dysphoric mood and suicidal ideas. The patient is nervous/anxious.         Otherwise negative       ALLERGIES:   Review of patient's allergies indicates:   Allergen Reactions    Tetracycline Other (See Comments)       MEDS:     Current Outpatient Medications:     azelastine (ASTELIN) 137 mcg (0.1 %) nasal spray, azelastine 137 mcg (0.1 %) nasal spray aerosol  Spray 1 spray twice a day by intranasal route as needed., Disp: , Rfl:     levocetirizine (XYZAL) 5 MG tablet, Take 1 tablet (5 mg total) by mouth every evening., Disp: 90 tablet, Rfl: 3    mag carb/aluminum hydrox/algin (GAVISCON EXTRA STRENGTH ORAL), Take by mouth as needed., Disp: , Rfl:     pantoprazole (PROTONIX) 40 MG tablet, pantoprazole 40 mg tablet,delayed release  Take 1 tablet every day by oral route., Disp: , Rfl:     FLUoxetine 20 MG capsule, Take 1 capsule (20 mg total) by mouth once daily., Disp: 30 capsule, Rfl: 1    Past Medical History:   Diagnosis Date    Anxiety     Deafness in right ear     since birth    Varicocele 8/17/2016       Past Surgical History:   Procedure Laterality Date    ENDOSCOPY  11/2019    VASECTOMY  12/27/2019       Family History   Problem Relation Age of Onset    Hypertension Mother     Hypertension Father     Diabetes Father     Heart disease Paternal Uncle 42        Heart Attack in 1999    Cancer  "Maternal Grandmother         Breast  cancer 2006    No Known Problems Maternal Grandfather     Prostate cancer Neg Hx     Kidney disease Neg Hx        Social History     Tobacco Use    Smoking status: Never Smoker    Smokeless tobacco: Never Used   Substance Use Topics    Alcohol use: Yes     Frequency: Monthly or less     Drinks per session: 1 or 2     Binge frequency: Never     Comment: rarely    Drug use: No       Social History     Social History Narrative    Lives in [place] with his wife and son born in 2017. He was  in December 22, 2014. He was employed by the [employer] office. 2 children (son and daughter born 2019). Works night shift security at [place]       OBJECTIVE:   Vitals:    01/31/20 0915   BP: 120/78   BP Location: Left arm   Patient Position: Sitting   BP Method: X-Large (Manual)   Pulse: 72   Temp: 98.3 °F (36.8 °C)   TempSrc: Oral   Weight: 119.3 kg (263 lb)     Body mass index is 35.67 kg/m².    Physical Exam   Constitutional: No distress.   Cardiovascular: Normal rate, regular rhythm, normal heart sounds and intact distal pulses. Exam reveals no gallop and no friction rub.   No murmur heard.  Pulmonary/Chest: Effort normal and breath sounds normal.   Neurological: He is alert.   Psychiatric: His speech is normal and behavior is normal. Judgment and thought content normal.   Mood: "irritable"   Affect: full range, smiles         Depression Patient Health Questionnaire 1/31/2020 12/27/2019 11/5/2019 11/5/2019   Over the last two weeks how often have you been bothered by little interest or pleasure in doing things 1 0 0 0   Over the last two weeks how often have you been bothered by feeling down, depressed or hopeless 1 0 0 0   PHQ-2 Total Score 2 0 0 0     No flowsheet data found.              PERTINENT RESULTS:   BMP  Lab Results   Component Value Date     10/29/2019    K 3.7 10/29/2019     10/29/2019    CO2 27 10/29/2019    BUN 12 " 10/29/2019    CREATININE 0.8 10/29/2019    CALCIUM 9.2 10/29/2019    ANIONGAP 8 10/29/2019    ESTGFRAFRICA >60.0 10/29/2019    EGFRNONAA >60.0 10/29/2019     Lab Results   Component Value Date    ALT 54 (H) 10/29/2019    AST 35 10/29/2019    ALKPHOS 83 10/29/2019    BILITOT 0.4 10/29/2019     Lab Results   Component Value Date    CHOL 99 (L) 07/05/2018    CHOL 168 05/05/2015     Lab Results   Component Value Date    HDL 28 (L) 07/05/2018    HDL 51 05/05/2015     Lab Results   Component Value Date    LDLCALC 54.8 (L) 07/05/2018    LDLCALC 105.8 05/05/2015     Lab Results   Component Value Date    TRIG 81 07/05/2018    TRIG 56 05/05/2015     Lab Results   Component Value Date    CHOLHDL 28.3 07/05/2018    CHOLHDL 30.4 05/05/2015       ASSESSMENT/PLAN:  Problem List Items Addressed This Visit        Psychiatric    Anxiety - Primary    Current Assessment & Plan     - counseling on anxiety and management of anxiety  - recommend counseling and gave list  - recommend relaxation techniques and gave handout on deep breathing and diaphragmatic breathing techniques  - recommend start Fluoxetine 20 mg daily  - they inquired about benzodiazepine and I discussed that due to high rates of dependence, I discourage benzodiazepine for long term use  - counseling regarding new medication including expected results, potential side effects, and appropriate use. Questions elicited and answered  - encouraged to patient to notify me of any questions or concerns         Relevant Medications    FLUoxetine 20 MG capsule    Intermittent explosive disorder    Current Assessment & Plan     - counseling on intermittent explosive disorder  - recommend counseling and gave list  - recommend relaxation techniques and gave handout on deep breathing and diaphragmatic breathing techniques  - recommend start Fluoxetine 20 mg daily  - they inquired about benzodiazepine and I discussed that due to high rates of dependence, I discourage benzodiazepine for  long term use  - counseling regarding new medication including expected results, potential side effects, and appropriate use. Questions elicited and answered  - encouraged to patient to notify me of any questions or concerns            ENT    Chronic rhinitis    Current Assessment & Plan     - well controlled  - continue current medications         Relevant Medications    levocetirizine (XYZAL) 5 MG tablet          ORDERS:   Orders Placed This Encounter    FLUoxetine 20 MG capsule    levocetirizine (XYZAL) 5 MG tablet     Follow-up in 1 month.     Dr. Dotty Virk D.O.   Family Medicine

## 2020-01-31 ENCOUNTER — OFFICE VISIT (OUTPATIENT)
Dept: FAMILY MEDICINE | Facility: CLINIC | Age: 30
End: 2020-01-31
Payer: MEDICAID

## 2020-01-31 VITALS
TEMPERATURE: 98 F | SYSTOLIC BLOOD PRESSURE: 120 MMHG | HEART RATE: 72 BPM | WEIGHT: 263 LBS | DIASTOLIC BLOOD PRESSURE: 78 MMHG | BODY MASS INDEX: 35.67 KG/M2

## 2020-01-31 DIAGNOSIS — F63.81 INTERMITTENT EXPLOSIVE DISORDER: ICD-10-CM

## 2020-01-31 DIAGNOSIS — F41.9 ANXIETY: Primary | ICD-10-CM

## 2020-01-31 DIAGNOSIS — J31.0 CHRONIC RHINITIS: ICD-10-CM

## 2020-01-31 PROCEDURE — 99999 PR PBB SHADOW E&M-EST. PATIENT-LVL IV: ICD-10-PCS | Mod: PBBFAC,,, | Performed by: FAMILY MEDICINE

## 2020-01-31 PROCEDURE — 99214 OFFICE O/P EST MOD 30 MIN: CPT | Mod: PBBFAC,PN | Performed by: FAMILY MEDICINE

## 2020-01-31 PROCEDURE — 99999 PR PBB SHADOW E&M-EST. PATIENT-LVL IV: CPT | Mod: PBBFAC,,, | Performed by: FAMILY MEDICINE

## 2020-01-31 PROCEDURE — 99214 PR OFFICE/OUTPT VISIT, EST, LEVL IV, 30-39 MIN: ICD-10-PCS | Mod: S$PBB,,, | Performed by: FAMILY MEDICINE

## 2020-01-31 PROCEDURE — 99214 OFFICE O/P EST MOD 30 MIN: CPT | Mod: S$PBB,,, | Performed by: FAMILY MEDICINE

## 2020-01-31 RX ORDER — LEVOCETIRIZINE DIHYDROCHLORIDE 5 MG/1
5 TABLET, FILM COATED ORAL NIGHTLY
Qty: 90 TABLET | Refills: 3 | Status: SHIPPED | OUTPATIENT
Start: 2020-01-31 | End: 2021-03-01 | Stop reason: SDUPTHER

## 2020-01-31 RX ORDER — FLUOXETINE HYDROCHLORIDE 20 MG/1
20 CAPSULE ORAL DAILY
Qty: 30 CAPSULE | Refills: 1 | Status: SHIPPED | OUTPATIENT
Start: 2020-01-31 | End: 2020-03-03 | Stop reason: SDUPTHER

## 2020-01-31 NOTE — ASSESSMENT & PLAN NOTE
- counseling on intermittent explosive disorder  - recommend counseling and gave list  - recommend relaxation techniques and gave handout on deep breathing and diaphragmatic breathing techniques  - recommend start Fluoxetine 20 mg daily  - they inquired about benzodiazepine and I discussed that due to high rates of dependence, I discourage benzodiazepine for long term use  - counseling regarding new medication including expected results, potential side effects, and appropriate use. Questions elicited and answered  - encouraged to patient to notify me of any questions or concerns

## 2020-02-24 ENCOUNTER — PATIENT OUTREACH (OUTPATIENT)
Dept: ADMINISTRATIVE | Facility: HOSPITAL | Age: 30
End: 2020-02-24

## 2020-03-03 DIAGNOSIS — F41.9 ANXIETY: ICD-10-CM

## 2020-03-03 RX ORDER — FLUOXETINE HYDROCHLORIDE 20 MG/1
20 CAPSULE ORAL DAILY
Qty: 90 CAPSULE | Refills: 1 | Status: SHIPPED | OUTPATIENT
Start: 2020-03-03 | End: 2020-10-12

## 2020-03-03 NOTE — TELEPHONE ENCOUNTER
Spoke with pt, pt stated he had an appointment with you next Monday on the 9th but he has to work do he had t cancel it however he needs a refill. Can you refill it or can we add him in ncik 2:30 visit?

## 2020-03-03 NOTE — TELEPHONE ENCOUNTER
----- Message from Xiomara Sears sent at 3/3/2020 12:15 PM CST -----  Contact: pt portal  Appointment Request From: Marshal Baer    With Provider: Dotty Virk DO [[place] [facility]]    Preferred Date Range: 3/9/2020 - 3/20/2020    Preferred Times: Monday Afternoon, Tuesday Afternoon, Wednesday Afternoon, Thursday Afternoon, Friday Afternoon    Reason for visit: Existing Patient    Comments:  Explain to doctor

## 2020-03-04 ENCOUNTER — TELEPHONE (OUTPATIENT)
Dept: INTERNAL MEDICINE | Facility: CLINIC | Age: 30
End: 2020-03-04

## 2020-03-04 NOTE — TELEPHONE ENCOUNTER
----- Message from Pat Guido sent at 3/4/2020 10:57 AM CST -----  Contact: [phone]/self  Type:  Same Day Appointment Request    Caller is requesting a same day appointment.  Caller declined first available appointment listed below.    Name of Caller: patient  When is the first available appointment?   Symptoms: refill  Best Call Back Number: [phone]  Additional Information: n/a

## 2020-04-06 ENCOUNTER — PATIENT MESSAGE (OUTPATIENT)
Dept: FAMILY MEDICINE | Facility: CLINIC | Age: 30
End: 2020-04-06

## 2020-04-06 DIAGNOSIS — R10.11 RUQ PAIN: Primary | ICD-10-CM

## 2020-04-07 ENCOUNTER — PATIENT MESSAGE (OUTPATIENT)
Dept: FAMILY MEDICINE | Facility: CLINIC | Age: 30
End: 2020-04-07

## 2020-04-28 ENCOUNTER — PATIENT MESSAGE (OUTPATIENT)
Dept: FAMILY MEDICINE | Facility: CLINIC | Age: 30
End: 2020-04-28

## 2020-04-28 NOTE — TELEPHONE ENCOUNTER
Please call patient and see if he would like an appointment to be seen  Dr. Dotty Virk D.O.   [facility]

## 2020-04-30 ENCOUNTER — PATIENT MESSAGE (OUTPATIENT)
Dept: FAMILY MEDICINE | Facility: CLINIC | Age: 30
End: 2020-04-30

## 2020-05-07 PROBLEM — E66.09 CLASS 2 OBESITY DUE TO EXCESS CALORIES WITHOUT SERIOUS COMORBIDITY WITH BODY MASS INDEX (BMI) OF 35.0 TO 35.9 IN ADULT: Status: ACTIVE | Noted: 2020-05-07

## 2020-05-07 PROBLEM — E66.812 CLASS 2 OBESITY DUE TO EXCESS CALORIES WITHOUT SERIOUS COMORBIDITY WITH BODY MASS INDEX (BMI) OF 35.0 TO 35.9 IN ADULT: Status: ACTIVE | Noted: 2020-05-07

## 2020-07-09 ENCOUNTER — OFFICE VISIT (OUTPATIENT)
Dept: GASTROENTEROLOGY | Facility: CLINIC | Age: 30
End: 2020-07-09
Payer: MEDICAID

## 2020-07-09 VITALS
HEIGHT: 72 IN | DIASTOLIC BLOOD PRESSURE: 84 MMHG | BODY MASS INDEX: 36.04 KG/M2 | WEIGHT: 266.13 LBS | SYSTOLIC BLOOD PRESSURE: 130 MMHG | HEART RATE: 84 BPM

## 2020-07-09 DIAGNOSIS — Z87.11 HISTORY OF PEPTIC ULCER: ICD-10-CM

## 2020-07-09 DIAGNOSIS — Z01.818 PREOPERATIVE EXAMINATION: ICD-10-CM

## 2020-07-09 DIAGNOSIS — R10.11 RUQ PAIN: Primary | ICD-10-CM

## 2020-07-09 PROCEDURE — 99214 OFFICE O/P EST MOD 30 MIN: CPT | Mod: PBBFAC,PO | Performed by: NURSE PRACTITIONER

## 2020-07-09 PROCEDURE — 99203 PR OFFICE/OUTPT VISIT, NEW, LEVL III, 30-44 MIN: ICD-10-PCS | Mod: S$PBB,,, | Performed by: NURSE PRACTITIONER

## 2020-07-09 PROCEDURE — 99999 PR PBB SHADOW E&M-EST. PATIENT-LVL IV: CPT | Mod: PBBFAC,,, | Performed by: NURSE PRACTITIONER

## 2020-07-09 PROCEDURE — 99203 OFFICE O/P NEW LOW 30 MIN: CPT | Mod: S$PBB,,, | Performed by: NURSE PRACTITIONER

## 2020-07-09 PROCEDURE — 99999 PR PBB SHADOW E&M-EST. PATIENT-LVL IV: ICD-10-PCS | Mod: PBBFAC,,, | Performed by: NURSE PRACTITIONER

## 2020-07-09 RX ORDER — SODIUM FLUORIDE/POTASSIUM NIT 1.1 %-5 %
PASTE (ML) DENTAL
COMMUNITY
Start: 2020-07-07 | End: 2022-01-12

## 2020-07-09 RX ORDER — SUCRALFATE 1 G/10ML
1 SUSPENSION ORAL
Qty: 600 ML | Refills: 2 | Status: SHIPPED | OUTPATIENT
Start: 2020-07-09 | End: 2020-07-23

## 2020-07-09 NOTE — PROGRESS NOTES
Subjective:       Patient ID: Marshal Baer is a 30 y.o. male.    Chief Complaint: Abdominal Pain    29 y/o male presents to clinic with c/o RUQ abdominal pain. History of peptic ulcer disease-11/2019 EGD with [facility] (will attempt to get records. Patient reports sharp, shooting RUQ pain for 2-3 months. States pain is not related to food intake. He is taking pantoprazole 40 mg daily with no pain relief. No n/v, constipation, diarrhea, belching, fever, or weight loss.       Past Medical History:   Diagnosis Date    Anxiety     Deafness in right ear     since birth    Varicocele 8/17/2016       Past Surgical History:   Procedure Laterality Date    ENDOSCOPY  11/2019    VASECTOMY  12/27/2019       Family History   Problem Relation Age of Onset    Hypertension Mother     Hypertension Father     Diabetes Father     Heart disease Paternal Uncle 42        Heart Attack in 1999    Cancer Maternal Grandmother         Breast  cancer 2006    No Known Problems Maternal Grandfather     Prostate cancer Neg Hx     Kidney disease Neg Hx        Social History     Socioeconomic History    Marital status:      Spouse name: Not on file    Number of children: 2    Years of education: Not on file    Highest education level: Not on file   Occupational History     Employer: [employer]   Social Needs    Financial resource strain: Not very hard    Food insecurity     Worry: Never true     Inability: Never true    Transportation needs     Medical: No     Non-medical: No   Tobacco Use    Smoking status: Never Smoker    Smokeless tobacco: Never Used   Substance and Sexual Activity    Alcohol use: Yes     Frequency: Monthly or less     Drinks per session: 1 or 2     Binge frequency: Never     Comment: rarely    Drug use: No    Sexual activity: Yes     Partners: Female   Lifestyle    Physical activity     Days per week: 2 days     Minutes per session: 10 min    Stress: To some  extent   Relationships    Social connections     Talks on phone: More than three times a week     Gets together: Twice a week     Attends Ep[employer] service: Not on file     Active member of club or organization: No     Attends meetings of clubs or organizations: Never     Relationship status:    Other Topics Concern    Not on file   Social History Narrative    Lives in [place] with his wife and son born in 2017. He was  in December 22, 2014. He was employed by the [employer]. 2 children (son and daughter born 2019). Works night shift security at [employer]       Review of Systems   Constitutional: Negative for fatigue, fever and unexpected weight change.   HENT: Negative for trouble swallowing.    Respiratory: Negative for cough and shortness of breath.    Cardiovascular: Negative for chest pain.   Gastrointestinal: Positive for abdominal pain.   Musculoskeletal: Negative for myalgias.   Allergic/Immunologic: Negative for immunocompromised state.   Neurological: Negative for dizziness and headaches.   Hematological: Does not bruise/bleed easily.   Psychiatric/Behavioral: Negative for dysphoric mood.         Objective:     Vitals:    07/09/20 1531   BP: 130/84   Pulse: 84   Weight: 120.7 kg (266 lb 1.6 oz)   Height: 6' (1.829 m)          Physical Exam  Constitutional:       General: He is not in acute distress.     Appearance: Normal appearance. He is well-developed. He is not ill-appearing.   HENT:      Head: Normocephalic.   Eyes:      Conjunctiva/sclera: Conjunctivae normal.      Pupils: Pupils are equal, round, and reactive to light.   Neck:      Musculoskeletal: Normal range of motion and neck supple.   Cardiovascular:      Rate and Rhythm: Normal rate and regular rhythm.   Pulmonary:      Effort: Pulmonary effort is normal.      Breath sounds: Normal breath sounds.   Abdominal:      General: Bowel sounds are normal.      Palpations: Abdomen is soft.       Tenderness: There is no abdominal tenderness.   Musculoskeletal: Normal range of motion.   Skin:     General: Skin is warm and dry.   Neurological:      Mental Status: He is alert and oriented to person, place, and time.   Psychiatric:         Mood and Affect: Mood normal.               Assessment:         ICD-10-CM ICD-9-CM   1. RUQ pain  R10.11 789.01   2. History of peptic ulcer  Z87.11 V12.71   3. Preoperative examination  Z01.818 V72.84       Plan:       RUQ pain, History of peptic ulcer  -     sucralfate (CARAFATE) 100 mg/mL suspension; Take 10 mLs (1 g total) by mouth 4 (four) times daily with meals and nightly. for 14 days  Dispense: 600 mL; Refill: 2  -     Case request GI: EGD (ESOPHAGOGASTRODUODENOSCOPY)    Preoperative examination  -     COVID-19 Routine Screening; Future; Expected date: 07/09/2020      Follow up if symptoms worsen or fail to improve.     Patient's Medications   New Prescriptions    SUCRALFATE (CARAFATE) 100 MG/ML SUSPENSION    Take 10 mLs (1 g total) by mouth 4 (four) times daily with meals and nightly. for 14 days   Previous Medications    AZELASTINE (ASTELIN) 137 MCG (0.1 %) NASAL SPRAY    azelastine 137 mcg (0.1 %) nasal spray aerosol   Spray 1 spray twice a day by intranasal route as needed.    FLUOXETINE 20 MG CAPSULE    Take 1 capsule (20 mg total) by mouth once daily.    LEVOCETIRIZINE (XYZAL) 5 MG TABLET    Take 1 tablet (5 mg total) by mouth every evening.    MAG CARB/ALUMINUM HYDROX/ALGIN (GAVISCON EXTRA STRENGTH ORAL)    Take by mouth as needed.    PANTOPRAZOLE (PROTONIX) 40 MG TABLET    pantoprazole 40 mg tablet,delayed release   Take 1 tablet every day by oral route.    PREVIDENT 5000 SENSITIVE 1.1-5 % PSTE    USE AS DIRECTED DO NOT EAT DRINK OR RINSE 30 MINUTES AFTER BRUSHING   Modified Medications    No medications on file   Discontinued Medications    No medications on file

## 2020-07-09 NOTE — PATIENT INSTRUCTIONS
Abdominal Pain    Abdominal pain is pain in the stomach or belly area. Everyone has this pain from time to time. In many cases it goes away on its own. But abdominal pain can sometimes be due to a serious problem, such as appendicitis. So its important to know when to seek help.  Causes of abdominal pain  There are many possible causes of abdominal pain. Common causes in adults include:  · Constipation, diarrhea, or gas  · Stomach acid flowing back up into the esophagus (acid reflux or heartburn)  · Severe acid reflux, called GERD (gastroesophageal reflux disease)  · A sore in the lining of the stomach or small intestine (peptic ulcer)  · Inflammation of the gallbladder, liver, or pancreas  · Gallstones or kidney stones  · Appendicitis   · Intestinal blockage   · An internal organ pushing through a muscle or other tissue (hernia)  · Urinary tract infections  · In women, menstrual cramps, fibroids, or endometriosis  · Inflammation or infection of the intestines  Diagnosing the cause of abdominal pain  Your healthcare provider will do a physical exam help find the cause of your pain. If needed, tests will be ordered. Belly pain has many possible causes. So it can be hard to find the reason for your pain. Giving details about your pain can help. Tell your provider where and when you feel the pain, and what makes it better or worse. Also let your provider know if you have other symptoms such as:  · Fever  · Tiredness  · Upset stomach (nausea)  · Vomiting  · Changes in bathroom habits  Treating abdominal pain  Some causes of pain need emergency medical treatment right away. These include appendicitis or a bowel blockage. Other problems can be treated with rest, fluids, or medicines. Your healthcare provider can give you specific instructions for treatment or self-care based on what is causing your pain.  If you have vomiting or diarrhea, sip water or other clear fluids. When you are ready to eat solid foods again,  start with small amounts of easy-to-digest, low-fat foods. These include apple sauce, toast, or crackers.   When to seek medical care  Call 911 or go to the hospital right away if you:  · Cant pass stool and are vomiting  · Are vomiting blood or have bloody diarrhea or black, tarry diarrhea  · Have chest, neck, or shoulder pain  · Feel like you might pass out  · Have pain in your shoulder blades with nausea  · Have sudden, severe belly pain  · Have new, severe pain unlike any you have felt before  · Have a belly that is rigid, hard, and tender to touch  Call your healthcare provider if you have:  · Pain for more than 5 days  · Bloating for more than 2 days  · Diarrhea for more than 5 days  · A fever of 100.4°F (38.0°C) or higher, or as directed by your provider  · Pain that gets worse  · Weight loss for no reason  · Continued lack of appetite  · Blood in your stool  How to prevent abdominal pain  Here are some tips to help prevent abdominal pain:  · Eat smaller amounts of food at one time.  · Avoid greasy, fried, or other high-fat foods.  · Avoid foods that give you gas.  · Exercise regularly.  · Drink plenty of fluids.  To help prevent GERD symptoms:  · Quit smoking.  · Reduce alcohol and certain foods that increase stomach acid.  · Avoid aspirin and over-the-counter pain and fever medicines (NSAIDS or nonsteroidal anti-inflammatory drugs), if possible  · Lose extra weight.  · Finish eating at least 2 hours before you go to bed or lie down.  · Raise the head of your bed.  Date Last Reviewed: 7/1/2016  © 5302-6457 [employer]. [address] [place] [address], [place] [address]. All rights reserved. This information is not intended as a substitute for professional medical care. Always follow your healthcare professional's instructions.      EGD Prep Instructions    [facility]  [address] in [facility] Office [phone]  Endoscopy Lab [phone]    You are scheduled  for an EGD with Dr. Patton on 8/21/20 at [facility].  You will enter through the [facility] Entrance and check in at Same Day Surgery.    Nothing to eat or drink after midnight before the procedure.  You MAY brush your teeth.    You MAY take your blood pressure, heart, and seizure medication on the morning of the procedure, with a SIP of water.  Hold ALL other medications until after the procedure.    If you are on blood thinners THAT YOU HAVE BEEN INSTRUCTED TO HOLD BY YOUR DOCTOR FOR THIS PROCEDURE, then do NOT take this the morning of your EGD.  Do NOT stop these medications on your own, they must be approved to be held by your doctor.  Your EGD can NOT be done if you are on these medications.  Examples of blood thinners include: Coumadin, Aggrenox, Plavix, Pradaxa, Reapro, Pletal, Xarelto, Ticagrelor, Brilinta, Eliquis, and high dose aspirin (325 mg).  You do not have to stop baby aspirin 81 mg.    You will receive a call 2-3 days before your EGD to tell you the time to arrive.  If you have not received a call by the day before your procedure, call the Endoscopy Lab at [phone].

## 2020-07-10 PROBLEM — Z87.11 HISTORY OF PEPTIC ULCER: Status: ACTIVE | Noted: 2020-07-10

## 2020-07-24 ENCOUNTER — TELEPHONE (OUTPATIENT)
Dept: GASTROENTEROLOGY | Facility: CLINIC | Age: 30
End: 2020-07-24

## 2020-07-24 NOTE — TELEPHONE ENCOUNTER
----- Message from Boyd Brenner sent at 7/24/2020  3:08 PM CDT -----  Contact: [phone]/ patient  Marshal Baer calling to speak with Shanda Colon nurse regarding passing blood   Please call back to assist today at [phone]

## 2020-07-24 NOTE — TELEPHONE ENCOUNTER
Spoke with patient. Relayed Shanda's message. Patient verbalized understanding. Rescheduled EGD to 8/7/20

## 2020-07-24 NOTE — TELEPHONE ENCOUNTER
Spoke with patient. He states that he has had three bowel movements today and each time there was a significant amount of bright red blood when he wiped. He is wondering if it could be from his ulcer or if it is something else. Please advise.

## 2020-08-04 ENCOUNTER — LAB VISIT (OUTPATIENT)
Dept: FAMILY MEDICINE | Facility: CLINIC | Age: 30
End: 2020-08-04
Payer: COMMERCIAL

## 2020-08-04 DIAGNOSIS — Z01.818 PREOPERATIVE EXAMINATION: ICD-10-CM

## 2020-08-04 PROCEDURE — U0003 INFECTIOUS AGENT DETECTION BY NUCLEIC ACID (DNA OR RNA); SEVERE ACUTE RESPIRATORY SYNDROME CORONAVIRUS 2 (SARS-COV-2) (CORONAVIRUS DISEASE [COVID-19]), AMPLIFIED PROBE TECHNIQUE, MAKING USE OF HIGH THROUGHPUT TECHNOLOGIES AS DESCRIBED BY CMS-2020-01-R: HCPCS

## 2020-08-05 LAB — SARS-COV-2 RNA RESP QL NAA+PROBE: NOT DETECTED

## 2020-08-07 PROBLEM — R10.11 RUQ PAIN: Status: ACTIVE | Noted: 2020-08-07

## 2020-08-26 ENCOUNTER — TELEPHONE (OUTPATIENT)
Dept: GASTROENTEROLOGY | Facility: CLINIC | Age: 30
End: 2020-08-26

## 2020-08-26 NOTE — TELEPHONE ENCOUNTER
----- Message from Valentina Patton MD sent at 8/26/2020  7:55 AM CDT -----  EoE (-), HP (-).  Significant reflux noted on exam; cont PPI.  Pt also needs colonoscopy due to intermittent rectal bleeding, please help him to schedule.

## 2020-08-26 NOTE — TELEPHONE ENCOUNTER
Patient has been in form that his EOE is neg as well HP neg. Significant reflux noted on exam, cont PPI. Will call patient back to schedule colonoscopy due to intermittent rectal bleeding. Vf/ma

## 2020-10-12 DIAGNOSIS — Z00.01 ENCOUNTER FOR GENERAL ADULT MEDICAL EXAMINATION WITH ABNORMAL FINDINGS: Primary | ICD-10-CM

## 2020-10-12 DIAGNOSIS — F41.9 ANXIETY: ICD-10-CM

## 2020-10-12 RX ORDER — FLUOXETINE HYDROCHLORIDE 20 MG/1
CAPSULE ORAL
Qty: 90 CAPSULE | Refills: 0 | Status: SHIPPED | OUTPATIENT
Start: 2020-10-12 | End: 2020-12-31 | Stop reason: SDUPTHER

## 2020-10-12 NOTE — TELEPHONE ENCOUNTER
Please notify pt.   1. Medications have been refilled for 90 days   Orders Placed This Encounter    FLUoxetine 20 MG capsule   2.   3. He is due for an appointment prior to further refills.   4. Labs are also due and have been ordered. Recommend labs be done at least 2-7 days before visit and recommend schedule   Dr. Dotty Virk D.O.   [facility]

## 2020-10-19 ENCOUNTER — TELEPHONE (OUTPATIENT)
Dept: GASTROENTEROLOGY | Facility: CLINIC | Age: 30
End: 2020-10-19

## 2020-10-19 NOTE — TELEPHONE ENCOUNTER
Spoke to patient about scheduling a colonoscopy for rectal bleeding. Patient states he will have to call back on tomorrow after he looks at his work schedule.

## 2020-11-18 ENCOUNTER — PATIENT MESSAGE (OUTPATIENT)
Dept: FAMILY MEDICINE | Facility: CLINIC | Age: 30
End: 2020-11-18

## 2020-12-10 ENCOUNTER — OFFICE VISIT (OUTPATIENT)
Dept: UROLOGY | Facility: CLINIC | Age: 30
End: 2020-12-10
Payer: MEDICAID

## 2020-12-10 VITALS
SYSTOLIC BLOOD PRESSURE: 110 MMHG | TEMPERATURE: 97 F | WEIGHT: 268.81 LBS | RESPIRATION RATE: 18 BRPM | BODY MASS INDEX: 36.41 KG/M2 | HEART RATE: 71 BPM | DIASTOLIC BLOOD PRESSURE: 84 MMHG | HEIGHT: 72 IN | OXYGEN SATURATION: 97 %

## 2020-12-10 DIAGNOSIS — R35.0 BENIGN PROSTATIC HYPERPLASIA WITH URINARY FREQUENCY: Primary | ICD-10-CM

## 2020-12-10 DIAGNOSIS — R39.14 FEELING OF INCOMPLETE BLADDER EMPTYING: ICD-10-CM

## 2020-12-10 DIAGNOSIS — R35.0 URINARY FREQUENCY: ICD-10-CM

## 2020-12-10 DIAGNOSIS — R39.11 URINARY HESITANCY: ICD-10-CM

## 2020-12-10 DIAGNOSIS — N40.1 BENIGN PROSTATIC HYPERPLASIA WITH URINARY FREQUENCY: Primary | ICD-10-CM

## 2020-12-10 PROCEDURE — 87086 URINE CULTURE/COLONY COUNT: CPT

## 2020-12-10 PROCEDURE — 51798 US URINE CAPACITY MEASURE: CPT | Mod: PBBFAC,PO | Performed by: NURSE PRACTITIONER

## 2020-12-10 PROCEDURE — 99999 PR PBB SHADOW E&M-EST. PATIENT-LVL IV: CPT | Mod: PBBFAC,,, | Performed by: NURSE PRACTITIONER

## 2020-12-10 PROCEDURE — 81002 URINALYSIS NONAUTO W/O SCOPE: CPT | Mod: PBBFAC,PO | Performed by: NURSE PRACTITIONER

## 2020-12-10 PROCEDURE — 99214 OFFICE O/P EST MOD 30 MIN: CPT | Mod: PBBFAC,PO,25 | Performed by: NURSE PRACTITIONER

## 2020-12-10 PROCEDURE — 99999 PR PBB SHADOW E&M-EST. PATIENT-LVL IV: ICD-10-PCS | Mod: PBBFAC,,, | Performed by: NURSE PRACTITIONER

## 2020-12-10 PROCEDURE — 99214 OFFICE O/P EST MOD 30 MIN: CPT | Mod: S$GLB,,, | Performed by: NURSE PRACTITIONER

## 2020-12-10 PROCEDURE — 99214 PR OFFICE/OUTPT VISIT, EST, LEVL IV, 30-39 MIN: ICD-10-PCS | Mod: S$GLB,,, | Performed by: NURSE PRACTITIONER

## 2020-12-10 RX ORDER — TAMSULOSIN HYDROCHLORIDE 0.4 MG/1
0.4 CAPSULE ORAL DAILY
Qty: 30 CAPSULE | Refills: 1 | Status: SHIPPED | OUTPATIENT
Start: 2020-12-10 | End: 2021-01-19 | Stop reason: SDUPTHER

## 2020-12-10 NOTE — PATIENT INSTRUCTIONS
1. Bladder scan (PVR)  2. Urine dipstick  3. Urine cx  4. GIORGIO today  5. Start trial of tamsulosin 0.4 mg daily for BPH.  6. Follow-up in 5 weeks.

## 2020-12-10 NOTE — PROGRESS NOTES
Subjective:       Patient ID: Marshal Baer is a 30 y.o. male.    Chief Complaint: Urinary Frequency and Urinary Urgency    Patient is a 31 yo WM who is here today with c/o urinary hesitancy, frequency, urgency, and feeling of incomplete bladder emptying.     Urinary Frequency   This is a chronic problem. Episode onset: Approximately 3 months ago. The problem has been gradually worsening. The pain is at a severity of 0/10. The patient is experiencing no pain. There has been no fever. He is sexually active. There is no history of pyelonephritis. Associated symptoms include frequency, hesitancy and urgency. Pertinent negatives include no behavior changes, chills, discharge, flank pain, hematuria, nausea, sweats, vomiting, weight loss, bubble bath use, constipation, rash or withholding. He has tried nothing for the symptoms. There is no history of diabetes mellitus, hypertension, kidney stones, recurrent UTIs, a single kidney or a urological procedure.     Review of Systems   Constitutional: Negative for appetite change, chills, fatigue, fever and weight loss.   HENT: Negative.    Respiratory: Negative.    Cardiovascular: Negative.    Gastrointestinal: Negative for abdominal pain, change in bowel habit, constipation, diarrhea, nausea, vomiting and change in bowel habit.   Genitourinary: Positive for difficulty urinating (urinary hesitancy), frequency, hesitancy and urgency. Negative for decreased urine volume, discharge, dysuria, flank pain, hematuria, penile pain, penile swelling, scrotal swelling and testicular pain.   Musculoskeletal: Negative.    Integumentary:  Negative for rash.   Neurological: Negative for dizziness, weakness and headaches.   Psychiatric/Behavioral: Negative.          Objective:      Physical Exam  Vitals signs and nursing note reviewed.   Constitutional:       General: He is not in acute distress.     Appearance: He is well-developed. He is obese. He is not ill-appearing.   HENT:       Head: Normocephalic and atraumatic.   Eyes:      Pupils: Pupils are equal, round, and reactive to light.   Neck:      Musculoskeletal: Normal range of motion.   Cardiovascular:      Rate and Rhythm: Normal rate.   Pulmonary:      Effort: Pulmonary effort is normal. No respiratory distress.   Abdominal:      Palpations: Abdomen is soft.      Tenderness: There is no abdominal tenderness.   Genitourinary:     Prostate: Enlarged. Not tender and no nodules present.      Comments: PVR = 90 mL  Musculoskeletal: Normal range of motion.   Skin:     General: Skin is warm and dry.   Neurological:      Mental Status: He is alert and oriented to person, place, and time.      Coordination: Coordination normal.   Psychiatric:         Mood and Affect: Mood normal.         Behavior: Behavior normal.         Thought Content: Thought content normal.         Judgment: Judgment normal.         Assessment:       1. Benign prostatic hyperplasia with urinary frequency    2. Urinary frequency    3. Urinary hesitancy    4. Feeling of incomplete bladder emptying        Plan:       Marshal Callaway was seen today for urinary frequency and urinary urgency.    Diagnoses and all orders for this visit:    Benign prostatic hyperplasia with urinary frequency    Urinary frequency  -     POCT URINE DIPSTICK WITHOUT MICROSCOPE  -     Urine culture  -     POCT Bladder Scan  -     tamsulosin (FLOMAX) 0.4 mg Cap; Take 1 capsule (0.4 mg total) by mouth once daily.    Urinary hesitancy  -     POCT URINE DIPSTICK WITHOUT MICROSCOPE  -     Urine culture  -     POCT Bladder Scan  -     tamsulosin (FLOMAX) 0.4 mg Cap; Take 1 capsule (0.4 mg total) by mouth once daily.    Feeling of incomplete bladder emptying  -     POCT URINE DIPSTICK WITHOUT MICROSCOPE  -     Urine culture  -     POCT Bladder Scan  -     tamsulosin (FLOMAX) 0.4 mg Cap; Take 1 capsule (0.4 mg total) by mouth once daily.    Other order  1. GIORGIO today    Follow-up in 5 weeks.     Delvis URIOSTEGUI  Madhav, NP

## 2020-12-11 ENCOUNTER — PATIENT MESSAGE (OUTPATIENT)
Dept: UROLOGY | Facility: CLINIC | Age: 30
End: 2020-12-11

## 2020-12-11 ENCOUNTER — OFFICE VISIT (OUTPATIENT)
Dept: GASTROENTEROLOGY | Facility: CLINIC | Age: 30
End: 2020-12-11
Payer: COMMERCIAL

## 2020-12-11 VITALS
SYSTOLIC BLOOD PRESSURE: 126 MMHG | DIASTOLIC BLOOD PRESSURE: 80 MMHG | WEIGHT: 267.69 LBS | BODY MASS INDEX: 36.31 KG/M2

## 2020-12-11 DIAGNOSIS — K21.9 GASTROESOPHAGEAL REFLUX DISEASE WITHOUT ESOPHAGITIS: ICD-10-CM

## 2020-12-11 DIAGNOSIS — K62.5 RECTAL BLEEDING: Primary | ICD-10-CM

## 2020-12-11 DIAGNOSIS — Z01.818 PREOPERATIVE EXAMINATION: ICD-10-CM

## 2020-12-11 DIAGNOSIS — Z87.11 HISTORY OF PEPTIC ULCER: ICD-10-CM

## 2020-12-11 LAB
BACTERIA UR CULT: NO GROWTH
BILIRUB SERPL-MCNC: NORMAL MG/DL
BLOOD URINE, POC: NORMAL
CLARITY, POC UA: CLEAR
COLOR, POC UA: YELLOW
GLUCOSE UR QL STRIP: NORMAL
KETONES UR QL STRIP: NORMAL
LEUKOCYTE ESTERASE URINE, POC: NORMAL
NITRITE, POC UA: NORMAL
PH, POC UA: 5
POC RESIDUAL URINE VOLUME: 90 ML (ref 0–100)
PROTEIN, POC: NORMAL
SPECIFIC GRAVITY, POC UA: 1.03
UROBILINOGEN, POC UA: 0.2

## 2020-12-11 PROCEDURE — 99213 OFFICE O/P EST LOW 20 MIN: CPT | Mod: PBBFAC,PO | Performed by: NURSE PRACTITIONER

## 2020-12-11 PROCEDURE — 99214 PR OFFICE/OUTPT VISIT, EST, LEVL IV, 30-39 MIN: ICD-10-PCS | Mod: S$GLB,,, | Performed by: NURSE PRACTITIONER

## 2020-12-11 PROCEDURE — 99999 PR PBB SHADOW E&M-EST. PATIENT-LVL III: CPT | Mod: PBBFAC,,, | Performed by: NURSE PRACTITIONER

## 2020-12-11 PROCEDURE — 99214 OFFICE O/P EST MOD 30 MIN: CPT | Mod: S$GLB,,, | Performed by: NURSE PRACTITIONER

## 2020-12-11 PROCEDURE — 99999 PR PBB SHADOW E&M-EST. PATIENT-LVL III: ICD-10-PCS | Mod: PBBFAC,,, | Performed by: NURSE PRACTITIONER

## 2020-12-11 RX ORDER — SODIUM, POTASSIUM,MAG SULFATES 17.5-3.13G
1 SOLUTION, RECONSTITUTED, ORAL ORAL DAILY
Qty: 1 KIT | Refills: 0 | Status: SHIPPED | OUTPATIENT
Start: 2020-12-11 | End: 2020-12-13

## 2020-12-11 RX ORDER — PANTOPRAZOLE SODIUM 40 MG/1
40 TABLET, DELAYED RELEASE ORAL DAILY
Qty: 30 TABLET | Refills: 11 | Status: SHIPPED | OUTPATIENT
Start: 2020-12-11 | End: 2022-01-08 | Stop reason: SDUPTHER

## 2020-12-11 NOTE — PATIENT INSTRUCTIONS
Colonoscopy     A camera attached to a flexible tube with a viewing lens is used to take video pictures.     Colonoscopy is a test to view the inside of your lower digestive tract (colon and rectum). Sometimes it can show the last part of the small intestine (ileum). During the test, small pieces of tissue may be removed for testing. This is called a biopsy. Small growths, such as polyps, may also be removed.   Why is colonoscopy done?  The test is done to help look for colon cancer. And it can help find the source of abdominal pain, bleeding, and changes in bowel habits. It may be needed once a year, depending on factors such as your:  · Age  · Health history  · Family health history  · Symptoms  · Results from any prior colonoscopy  Risks and possible complications  These include:  · Bleeding               · A puncture or tear in the colon   · Risks of anesthesia  · A cancer lesion not being seen  Getting ready   To prepare for the test:  · Talk with your healthcare provider about the risks of the test (see below). Also ask your healthcare provider about alternatives to the test.  · Tell your healthcare provider about any medicines you take. Also tell him or her about any health conditions you may have.  · Make sure your rectum and colon are empty for the test. Follow the diet and bowel prep instructions exactly. If you dont, the test may need to be rescheduled.  · Plan for a friend or family member to drive you home after the test.     Colonoscopy provides an inside view of the entire colon.     You may discuss the results with your doctor right away or at a future visit.  During the test   The test is usually done in the hospital on an outpatient basis. This means you go home the same day. The procedure takes about 30 minutes. During that time:  · You are given relaxing (sedating) medicine through an IV line. You may be drowsy, or fully asleep.  · The healthcare provider will first give you a physical exam to  check for anal and rectal problems.  · Then the anus is lubricated and the scope inserted.  · If you are awake, you may have a feeling similar to needing to have a bowel movement. You may also feel pressure as air is pumped into the colon. Its OK to pass gas during the procedure.  · Biopsy, polyp removal, or other treatments may be done during the test.  After the test   You may have gas right after the test. It can help to try to pass it to help prevent later bloating. Your healthcare provider may discuss the results with you right away. Or you may need to schedule a follow-up visit to talk about the results. After the test, you can go back to your normal eating and other activities. You may be tired from the sedation and need to rest for a few hours.  Date Last Reviewed: 11/1/2016  © 0546-8758 [employer]. [address] [place] [address]. All rights reserved. This information is not intended as a substitute for professional medical care. Always follow your healthcare professional's instructions.      SUPREP Instructions    You are scheduled for a colonoscopy with Dr. Patton on 1/12/2021 at [facility].  To ensure that your test is accurate and complete, you MUST follow these instructions listed below.  If you have any questions, please call our office at [phone].  Plan on being at the hospital for your procedure for 3-4 hours.    1.  Follow a CLEAR LIQUID DIET for the entire day before your scheduled colonoscopy.  This means no solid food the entire day starting when you wake.  You may have as much of the clear liquids as you want throughout the day.   CLEAR LIQUID DIET:   - Avoid Red, Orange, Purple, and/or Blue food coloring   - NO DAIRY   - You can have:  Coffee with sugar (no creamer), tea, water, soda, apple or white grape juice, chicken or beef broth/bouillon (no meat, noodles, or veggies), green/yellow popsicles, green/yellow Jell-O, lemonade.    2.  AT 5 pm the evening  before your colonoscopy, POUR ONE (1) BOTTLE OF SUPREP INTO THE MIXING CONTAINER, PROVIDED INSIDE THE BOX.  ADD WATER TO THE LINE ON THE CONTAINER AND MIX IT WELL.  DRINK THE ENTIRE CONTAINER AND THEN DRINK TWO (2) MORE CONTAINERS OF WATER OVER THE NEXT 1 HOUR.  This is sometimes easier to drink if this solution is cold, so you can mix the solution 20 minutes ahead of time and place in the refrigerator prior to drinking.  You have to drink the solution within 30-45 minutes of mixing it.  Do NOT put this solution over ice.  It IS ok to drink with a straw.    3.  The endoscopy department will call you 1 day before your colonoscopy to tell you the exact time to arrive, AND to tell you the exact time to drink the 2nd portion of your prep (which will be FIVE HOURS BEFORE YOUR ARRIVAL TIME).  At this time given to you, POUR ONE (1) BOTTLE OF SUPREP INTO THE MIXING CONTAINER, PROVIDED INSIDE THE BOX.  ADD WATER TO THE LINE ON THE CONTAINER AND MIX IT WELL.  DRINK THE ENTIRE CONTAINER AND THEN DRINK TWO (2) MORE CONTAINERS OF WATER OVER THE NEXT 1 HOUR.  This is sometimes easier to drink if this solution is cold, so you can mix the solution 20 minutes ahead of time and place in the refrigerator prior to drinking.  You have to drink the solution within 30-45 minutes of mixing it.  Do NOT put this solution over ice.  It IS ok to drink with a straw.  Once this is complete, you may not have ANYTHING else by mouth!    4.  You must have someone with you to DRIVE YOU HOME since you will be receiving IV sedation for the colonoscopy.    5.  It is ok to take MOST of your REGULAR MEDICATIONS  in the morning of your test with a SIP of water.  THE ONLY MEDS YOU NEED TO HOLD ARE YOUR DIABETES MEDICATIONS,  SOME BLOOD PRESSURE MEDS, AND BLOOD THINNERS IF OK'D BY YOUR DOCTOR.  Do NOT have anything else to eat or drink the morning of your colonoscopy.  It is ok to brush your teeth.    6.  If you are on blood thinners THAT YOU HAVE BEEN  INSTRUCTED TO HOLD BY YOUR DOCTOR FOR THIS PROCEDURE, then do NOT take this the morning of your colonoscopy.  Do NOT stop these medications on your own, they must be approved to be held by your doctor.  Your colonoscopy can NOT be done if you are on these medications.  Examples of blood thinners include: Coumadin, Aggrenox, Plavix, Pradaxa, Reapro, Pletal, Xarelto, Ticagrelor, Brilinta, Eliquis, and high dose aspirin (325 mg).  You do not have to stop baby aspirin 81 mg.    7.  IF YOU ARE DIABETIC:  NO INSULIN OR ORAL MEDICATIONS THE MORNING OF THE COLONOSCOPY.  TAKE ONLY HALF THE DOSE OF YOUR INSULIN THE DAY BEFORE THE COLONOSCOPY.  DO NOT TAKE ANY ORAL DIABETIC MEDICATIONS THE DAY BEFORE THE COLONOSCOPY.  IF YOU ARE AN INSULIN DEPENDENT DIABETIC WITH UNSTABLE BLOOD SUGARS, NOTIFY YOUR PRIMARY CARE PHYSICIAN FOR INSTRUCTIONS.

## 2020-12-11 NOTE — PROGRESS NOTES
"Subjective:       Patient ID: Marshal Baer is a 30 y.o. male.    Chief Complaint: Colonoscopy and Rectal Bleeding    29 y/o male with GERD and hx of PUD presents to clinic with c/o rectal bleeding. Patient reports intermittent rectal bleeding after BM at least 1-2 months. He has small blood clots mixed with stool and occasional stool is coated in streaks of blood States stool are "normal" and formed not hard and he does not straining. Denies rectal pain or trauma. GERD symptoms are controlled with daily pantoprazole-request refill. Denies dysphagia, n/v, or unintentional weight loss. No known FH colon polyps or caner.       Past Medical History:   Diagnosis Date    Anxiety     Deafness in right ear     since birth    Varicocele 8/17/2016       Past Surgical History:   Procedure Laterality Date    ENDOSCOPY  11/2019    ESOPHAGOGASTRODUODENOSCOPY N/A 8/7/2020    Procedure: EGD (ESOPHAGOGASTRODUODENOSCOPY);  Surgeon: Valentina Patton MD;  [facility];  Service: Endoscopy;  Laterality: N/A;    VASECTOMY  12/27/2019       Family History   Problem Relation Age of Onset    Hypertension Mother     Hypertension Father     Diabetes Father     Heart disease Paternal Uncle 42        Heart Attack in 1999    Cancer Maternal Grandmother         Breast  cancer 2006    No Known Problems Maternal Grandfather     Prostate cancer Neg Hx     Kidney disease Neg Hx        Social History     Socioeconomic History    Marital status:      Spouse name: Not on file    Number of children: 2    Years of education: Not on file    Highest education level: Not on file   Occupational History     Employer: [employer]   Social Needs    Financial resource strain: Not very hard    Food insecurity     Worry: Never true     Inability: Never true    Transportation needs     Medical: No     Non-medical: No   Tobacco Use    Smoking status: Never Smoker    Smokeless tobacco: Never Used "   Substance and Sexual Activity    Alcohol use: Yes     Frequency: Monthly or less     Drinks per session: 1 or 2     Binge frequency: Never     Comment: rarely    Drug use: No    Sexual activity: Yes     Partners: Female   Lifestyle    Physical activity     Days per week: 2 days     Minutes per session: 10 min    Stress: To some extent   Relationships    Social connections     Talks on phone: More than three times a week     Gets together: Twice a week     Attends [employer] service: Not on file     Active member of club or organization: No     Attends meetings of clubs or organizations: Never     Relationship status:    Other Topics Concern    Not on file   Social History Narrative    Lives in [place] with his wife and son born in 2017. He was  in December 22, 2014. He was employed by the [employer]. 2 children (son and daughter born 2019). Works night shift security at [employer]       Review of Systems   Constitutional: Negative for unexpected weight change.   HENT: Negative for trouble swallowing.    Respiratory: Negative for shortness of breath.    Gastrointestinal: Positive for blood in stool. Negative for constipation.   Allergic/Immunologic: Negative for immunocompromised state.   Neurological: Negative for dizziness.   Hematological: Does not bruise/bleed easily.   Psychiatric/Behavioral: Negative for dysphoric mood.         Objective:     Vitals:    12/11/20 1502   BP: 126/80   BP Location: Right arm   Patient Position: Sitting   BP Method: Large (Manual)   Weight: 121.4 kg (267 lb 11.2 oz)          Physical Exam  Constitutional:       General: He is not in acute distress.     Appearance: Normal appearance. He is well-developed. He is not ill-appearing.   HENT:      Head: Normocephalic.   Eyes:      Conjunctiva/sclera: Conjunctivae normal.      Pupils: Pupils are equal, round, and reactive to light.   Neck:      Musculoskeletal: Normal range of  motion.   Cardiovascular:      Rate and Rhythm: Normal rate and regular rhythm.   Pulmonary:      Effort: Pulmonary effort is normal.      Breath sounds: Normal breath sounds.   Musculoskeletal: Normal range of motion.   Skin:     General: Skin is warm and dry.   Neurological:      Mental Status: He is alert and oriented to person, place, and time.   Psychiatric:         Mood and Affect: Mood normal.               Assessment:         ICD-10-CM ICD-9-CM   1. Rectal bleeding  K62.5 569.3   2. Preoperative examination  Z01.818 V72.84   3. Gastroesophageal reflux disease without esophagitis  K21.9 530.81   4. History of peptic ulcer  Z87.11 V12.71       Plan:       Rectal bleeding  -     SUPREP BOWEL PREP KIT 17.5-3.13-1.6 gram SolR; Take 177 mLs by mouth once daily. for 2 days  Dispense: 1 kit; Refill: 0    Preoperative examination  -     COVID-19 Routine Screening; Future; Expected date: 12/11/2020    Gastroesophageal reflux disease without esophagitis  -     pantoprazole (PROTONIX) 40 MG tablet; Take 1 tablet (40 mg total) by mouth once daily.  Dispense: 30 tablet; Refill: 11    History of peptic ulcer  -     pantoprazole (PROTONIX) 40 MG tablet; Take 1 tablet (40 mg total) by mouth once daily.  Dispense: 30 tablet; Refill: 11    Follow up if symptoms worsen or fail to improve.     Patient's Medications   New Prescriptions    SUPREP BOWEL PREP KIT 17.5-3.13-1.6 GRAM SOLR    Take 177 mLs by mouth once daily. for 2 days   Previous Medications    AZELASTINE (ASTELIN) 137 MCG (0.1 %) NASAL SPRAY    azelastine 137 mcg (0.1 %) nasal spray aerosol   Spray 1 spray twice a day by intranasal route as needed.    FLUOXETINE 20 MG CAPSULE    Take 1 capsule by mouth once daily    LEVOCETIRIZINE (XYZAL) 5 MG TABLET    Take 1 tablet (5 mg total) by mouth every evening.    MAG CARB/ALUMINUM HYDROX/ALGIN (GAVISCON EXTRA STRENGTH ORAL)    Take by mouth as needed.    PREVIDENT 5000 SENSITIVE 1.1-5 % PSTE    USE AS DIRECTED DO NOT EAT  DRINK OR RINSE 30 MINUTES AFTER BRUSHING    TAMSULOSIN (FLOMAX) 0.4 MG CAP    Take 1 capsule (0.4 mg total) by mouth once daily.   Modified Medications    Modified Medication Previous Medication    PANTOPRAZOLE (PROTONIX) 40 MG TABLET pantoprazole (PROTONIX) 40 MG tablet       Take 1 tablet (40 mg total) by mouth once daily.    pantoprazole 40 mg tablet,delayed release   Take 1 tablet every day by oral route.   Discontinued Medications    No medications on file

## 2020-12-14 ENCOUNTER — PATIENT MESSAGE (OUTPATIENT)
Dept: GASTROENTEROLOGY | Facility: CLINIC | Age: 30
End: 2020-12-14

## 2020-12-14 NOTE — TELEPHONE ENCOUNTER
12/14/2020 Spoke with patient, he stated his prep Suprep is too expensive. I called patient pharmacy . They ran patient medication today under medicaid. Patient can  scrip. ) pay. Vf/ma

## 2020-12-30 ENCOUNTER — PATIENT MESSAGE (OUTPATIENT)
Dept: UROLOGY | Facility: CLINIC | Age: 30
End: 2020-12-30

## 2021-01-04 ENCOUNTER — OFFICE VISIT (OUTPATIENT)
Dept: FAMILY MEDICINE | Facility: CLINIC | Age: 31
End: 2021-01-04
Payer: COMMERCIAL

## 2021-01-04 VITALS
OXYGEN SATURATION: 96 % | DIASTOLIC BLOOD PRESSURE: 76 MMHG | BODY MASS INDEX: 36.84 KG/M2 | HEIGHT: 72 IN | TEMPERATURE: 98 F | SYSTOLIC BLOOD PRESSURE: 120 MMHG | HEART RATE: 72 BPM | WEIGHT: 272 LBS | RESPIRATION RATE: 18 BRPM

## 2021-01-04 DIAGNOSIS — F41.9 ANXIETY: Primary | ICD-10-CM

## 2021-01-04 DIAGNOSIS — E66.09 CLASS 2 OBESITY DUE TO EXCESS CALORIES WITHOUT SERIOUS COMORBIDITY WITH BODY MASS INDEX (BMI) OF 36.0 TO 36.9 IN ADULT: ICD-10-CM

## 2021-01-04 DIAGNOSIS — R79.89 ELEVATED LFTS: ICD-10-CM

## 2021-01-04 PROBLEM — E66.812 CLASS 2 OBESITY DUE TO EXCESS CALORIES WITHOUT SERIOUS COMORBIDITY WITH BODY MASS INDEX (BMI) OF 35.0 TO 35.9 IN ADULT: Status: RESOLVED | Noted: 2020-05-07 | Resolved: 2021-01-04

## 2021-01-04 PROCEDURE — 1126F PR PAIN SEVERITY QUANTIFIED, NO PAIN PRESENT: ICD-10-PCS | Mod: S$GLB,,, | Performed by: FAMILY MEDICINE

## 2021-01-04 PROCEDURE — 99214 PR OFFICE/OUTPT VISIT, EST, LEVL IV, 30-39 MIN: ICD-10-PCS | Mod: S$GLB,,, | Performed by: FAMILY MEDICINE

## 2021-01-04 PROCEDURE — 99999 PR PBB SHADOW E&M-EST. PATIENT-LVL IV: ICD-10-PCS | Mod: PBBFAC,,, | Performed by: FAMILY MEDICINE

## 2021-01-04 PROCEDURE — 1126F AMNT PAIN NOTED NONE PRSNT: CPT | Mod: S$GLB,,, | Performed by: FAMILY MEDICINE

## 2021-01-04 PROCEDURE — 3008F BODY MASS INDEX DOCD: CPT | Mod: CPTII,S$GLB,, | Performed by: FAMILY MEDICINE

## 2021-01-04 PROCEDURE — 99999 PR PBB SHADOW E&M-EST. PATIENT-LVL IV: CPT | Mod: PBBFAC,,, | Performed by: FAMILY MEDICINE

## 2021-01-04 PROCEDURE — 99214 OFFICE O/P EST MOD 30 MIN: CPT | Mod: S$GLB,,, | Performed by: FAMILY MEDICINE

## 2021-01-04 PROCEDURE — 3008F PR BODY MASS INDEX (BMI) DOCUMENTED: ICD-10-PCS | Mod: CPTII,S$GLB,, | Performed by: FAMILY MEDICINE

## 2021-01-04 RX ORDER — FLUOXETINE HYDROCHLORIDE 20 MG/1
20 CAPSULE ORAL DAILY
Qty: 90 CAPSULE | Refills: 4 | Status: SHIPPED | OUTPATIENT
Start: 2021-01-04 | End: 2022-02-13 | Stop reason: SDUPTHER

## 2021-01-09 ENCOUNTER — LAB VISIT (OUTPATIENT)
Dept: FAMILY MEDICINE | Facility: CLINIC | Age: 31
End: 2021-01-09
Payer: COMMERCIAL

## 2021-01-09 DIAGNOSIS — Z01.818 PREOPERATIVE EXAMINATION: ICD-10-CM

## 2021-01-09 PROCEDURE — U0003 INFECTIOUS AGENT DETECTION BY NUCLEIC ACID (DNA OR RNA); SEVERE ACUTE RESPIRATORY SYNDROME CORONAVIRUS 2 (SARS-COV-2) (CORONAVIRUS DISEASE [COVID-19]), AMPLIFIED PROBE TECHNIQUE, MAKING USE OF HIGH THROUGHPUT TECHNOLOGIES AS DESCRIBED BY CMS-2020-01-R: HCPCS

## 2021-01-10 LAB — SARS-COV-2 RNA RESP QL NAA+PROBE: NOT DETECTED

## 2021-01-19 ENCOUNTER — OFFICE VISIT (OUTPATIENT)
Dept: UROLOGY | Facility: CLINIC | Age: 31
End: 2021-01-19
Payer: COMMERCIAL

## 2021-01-19 DIAGNOSIS — R39.11 URINARY HESITANCY: ICD-10-CM

## 2021-01-19 DIAGNOSIS — R35.0 URINARY FREQUENCY: ICD-10-CM

## 2021-01-19 DIAGNOSIS — R39.14 FEELING OF INCOMPLETE BLADDER EMPTYING: ICD-10-CM

## 2021-01-19 DIAGNOSIS — N40.1 BENIGN PROSTATIC HYPERPLASIA WITH URINARY FREQUENCY: Primary | ICD-10-CM

## 2021-01-19 DIAGNOSIS — R35.0 BENIGN PROSTATIC HYPERPLASIA WITH URINARY FREQUENCY: Primary | ICD-10-CM

## 2021-01-19 LAB — POC RESIDUAL URINE VOLUME: 36 ML (ref 0–100)

## 2021-01-19 PROCEDURE — 99213 OFFICE O/P EST LOW 20 MIN: CPT | Mod: 95,ICN,, | Performed by: NURSE PRACTITIONER

## 2021-01-19 PROCEDURE — 99213 PR OFFICE/OUTPT VISIT, EST, LEVL III, 20-29 MIN: ICD-10-PCS | Mod: 95,ICN,, | Performed by: NURSE PRACTITIONER

## 2021-01-19 PROCEDURE — 51798 US URINE CAPACITY MEASURE: CPT | Mod: ICN,,, | Performed by: NURSE PRACTITIONER

## 2021-01-19 PROCEDURE — 51798 POCT BLADDER SCAN: ICD-10-PCS | Mod: ICN,,, | Performed by: NURSE PRACTITIONER

## 2021-01-19 RX ORDER — TAMSULOSIN HYDROCHLORIDE 0.4 MG/1
0.4 CAPSULE ORAL DAILY
Qty: 30 CAPSULE | Refills: 11 | Status: SHIPPED | OUTPATIENT
Start: 2021-01-19 | End: 2021-03-12 | Stop reason: SDUPTHER

## 2021-03-24 ENCOUNTER — PATIENT MESSAGE (OUTPATIENT)
Dept: FAMILY MEDICINE | Facility: CLINIC | Age: 31
End: 2021-03-24

## 2021-03-24 DIAGNOSIS — J31.0 CHRONIC RHINITIS: ICD-10-CM

## 2021-04-19 ENCOUNTER — OFFICE VISIT (OUTPATIENT)
Dept: UROLOGY | Facility: CLINIC | Age: 31
End: 2021-04-19
Payer: COMMERCIAL

## 2021-04-19 VITALS
BODY MASS INDEX: 37.79 KG/M2 | OXYGEN SATURATION: 98 % | RESPIRATION RATE: 18 BRPM | HEART RATE: 64 BPM | WEIGHT: 279 LBS | DIASTOLIC BLOOD PRESSURE: 90 MMHG | HEIGHT: 72 IN | TEMPERATURE: 98 F | SYSTOLIC BLOOD PRESSURE: 140 MMHG

## 2021-04-19 DIAGNOSIS — N50.812 PAIN IN BOTH TESTICLES: Primary | ICD-10-CM

## 2021-04-19 DIAGNOSIS — N50.811 PAIN IN BOTH TESTICLES: Primary | ICD-10-CM

## 2021-04-19 DIAGNOSIS — N40.1 BENIGN PROSTATIC HYPERPLASIA WITH URINARY FREQUENCY: ICD-10-CM

## 2021-04-19 DIAGNOSIS — R35.0 URINARY FREQUENCY: ICD-10-CM

## 2021-04-19 DIAGNOSIS — R35.0 BENIGN PROSTATIC HYPERPLASIA WITH URINARY FREQUENCY: ICD-10-CM

## 2021-04-19 DIAGNOSIS — N50.3 EPIDIDYMAL CYST: ICD-10-CM

## 2021-04-19 PROBLEM — R39.11 URINARY HESITANCY: Status: RESOLVED | Noted: 2020-12-10 | Resolved: 2021-04-19

## 2021-04-19 PROBLEM — R39.14 FEELING OF INCOMPLETE BLADDER EMPTYING: Status: RESOLVED | Noted: 2021-01-19 | Resolved: 2021-04-19

## 2021-04-19 LAB
BILIRUB SERPL-MCNC: NORMAL MG/DL
BLOOD URINE, POC: NORMAL
CLARITY, POC UA: NORMAL
COLOR, POC UA: YELLOW
GLUCOSE UR QL STRIP: NORMAL
KETONES UR QL STRIP: NORMAL
LEUKOCYTE ESTERASE URINE, POC: NORMAL
NITRITE, POC UA: NORMAL
PH, POC UA: 5.5
POC RESIDUAL URINE VOLUME: 50 ML (ref 0–100)
PROTEIN, POC: NORMAL
SPECIFIC GRAVITY, POC UA: 1.03
UROBILINOGEN, POC UA: 0.2

## 2021-04-19 PROCEDURE — 51798 POCT BLADDER SCAN: ICD-10-PCS | Mod: S$GLB,,, | Performed by: NURSE PRACTITIONER

## 2021-04-19 PROCEDURE — 1126F AMNT PAIN NOTED NONE PRSNT: CPT | Mod: S$GLB,,, | Performed by: NURSE PRACTITIONER

## 2021-04-19 PROCEDURE — 81002 POCT URINE DIPSTICK WITHOUT MICROSCOPE: ICD-10-PCS | Mod: S$GLB,,, | Performed by: NURSE PRACTITIONER

## 2021-04-19 PROCEDURE — 99999 PR PBB SHADOW E&M-EST. PATIENT-LVL IV: CPT | Mod: PBBFAC,,, | Performed by: NURSE PRACTITIONER

## 2021-04-19 PROCEDURE — 87086 URINE CULTURE/COLONY COUNT: CPT | Performed by: NURSE PRACTITIONER

## 2021-04-19 PROCEDURE — 81002 URINALYSIS NONAUTO W/O SCOPE: CPT | Mod: S$GLB,,, | Performed by: NURSE PRACTITIONER

## 2021-04-19 PROCEDURE — 99213 PR OFFICE/OUTPT VISIT, EST, LEVL III, 20-29 MIN: ICD-10-PCS | Mod: 25,S$GLB,, | Performed by: NURSE PRACTITIONER

## 2021-04-19 PROCEDURE — 1126F PR PAIN SEVERITY QUANTIFIED, NO PAIN PRESENT: ICD-10-PCS | Mod: S$GLB,,, | Performed by: NURSE PRACTITIONER

## 2021-04-19 PROCEDURE — 3008F BODY MASS INDEX DOCD: CPT | Mod: CPTII,S$GLB,, | Performed by: NURSE PRACTITIONER

## 2021-04-19 PROCEDURE — 99999 PR PBB SHADOW E&M-EST. PATIENT-LVL IV: ICD-10-PCS | Mod: PBBFAC,,, | Performed by: NURSE PRACTITIONER

## 2021-04-19 PROCEDURE — 51798 US URINE CAPACITY MEASURE: CPT | Mod: S$GLB,,, | Performed by: NURSE PRACTITIONER

## 2021-04-19 PROCEDURE — 3008F PR BODY MASS INDEX (BMI) DOCUMENTED: ICD-10-PCS | Mod: CPTII,S$GLB,, | Performed by: NURSE PRACTITIONER

## 2021-04-19 PROCEDURE — 99213 OFFICE O/P EST LOW 20 MIN: CPT | Mod: 25,S$GLB,, | Performed by: NURSE PRACTITIONER

## 2021-04-20 LAB — BACTERIA UR CULT: NO GROWTH

## 2021-04-22 ENCOUNTER — TELEPHONE (OUTPATIENT)
Dept: UROLOGY | Facility: CLINIC | Age: 31
End: 2021-04-22

## 2021-05-04 ENCOUNTER — PATIENT MESSAGE (OUTPATIENT)
Dept: RESEARCH | Facility: HOSPITAL | Age: 31
End: 2021-05-04

## 2021-05-26 ENCOUNTER — PATIENT MESSAGE (OUTPATIENT)
Dept: UROLOGY | Facility: CLINIC | Age: 31
End: 2021-05-26

## 2021-05-27 ENCOUNTER — PATIENT MESSAGE (OUTPATIENT)
Dept: UROLOGY | Facility: CLINIC | Age: 31
End: 2021-05-27

## 2021-07-14 ENCOUNTER — PATIENT MESSAGE (OUTPATIENT)
Dept: FAMILY MEDICINE | Facility: CLINIC | Age: 31
End: 2021-07-14

## 2021-07-17 DIAGNOSIS — U07.1 COVID-19 VIRUS DETECTED: ICD-10-CM

## 2021-07-21 ENCOUNTER — PATIENT MESSAGE (OUTPATIENT)
Dept: FAMILY MEDICINE | Facility: CLINIC | Age: 31
End: 2021-07-21

## 2021-07-23 ENCOUNTER — OFFICE VISIT (OUTPATIENT)
Dept: INTERNAL MEDICINE | Facility: CLINIC | Age: 31
End: 2021-07-23
Payer: COMMERCIAL

## 2021-07-23 ENCOUNTER — NURSE TRIAGE (OUTPATIENT)
Dept: ADMINISTRATIVE | Facility: CLINIC | Age: 31
End: 2021-07-23

## 2021-07-23 ENCOUNTER — TELEPHONE (OUTPATIENT)
Dept: INTERNAL MEDICINE | Facility: CLINIC | Age: 31
End: 2021-07-23

## 2021-07-23 DIAGNOSIS — U07.1 COVID-19 VIRUS INFECTION: Primary | ICD-10-CM

## 2021-07-23 PROCEDURE — 1160F RVW MEDS BY RX/DR IN RCRD: CPT | Mod: CPTII,,, | Performed by: INTERNAL MEDICINE

## 2021-07-23 PROCEDURE — 1159F MED LIST DOCD IN RCRD: CPT | Mod: CPTII,,, | Performed by: INTERNAL MEDICINE

## 2021-07-23 PROCEDURE — 99213 OFFICE O/P EST LOW 20 MIN: CPT | Mod: 95,,, | Performed by: INTERNAL MEDICINE

## 2021-07-23 PROCEDURE — 1159F PR MEDICATION LIST DOCUMENTED IN MEDICAL RECORD: ICD-10-PCS | Mod: CPTII,,, | Performed by: INTERNAL MEDICINE

## 2021-07-23 PROCEDURE — 99213 PR OFFICE/OUTPT VISIT, EST, LEVL III, 20-29 MIN: ICD-10-PCS | Mod: 95,,, | Performed by: INTERNAL MEDICINE

## 2021-07-23 PROCEDURE — 1160F PR REVIEW ALL MEDS BY PRESCRIBER/CLIN PHARMACIST DOCUMENTED: ICD-10-PCS | Mod: CPTII,,, | Performed by: INTERNAL MEDICINE

## 2021-07-26 ENCOUNTER — PATIENT MESSAGE (OUTPATIENT)
Dept: FAMILY MEDICINE | Facility: CLINIC | Age: 31
End: 2021-07-26

## 2021-12-03 ENCOUNTER — PATIENT MESSAGE (OUTPATIENT)
Dept: FAMILY MEDICINE | Facility: CLINIC | Age: 31
End: 2021-12-03
Payer: MEDICAID

## 2022-01-06 ENCOUNTER — LAB VISIT (OUTPATIENT)
Dept: FAMILY MEDICINE | Facility: CLINIC | Age: 32
End: 2022-01-06
Payer: COMMERCIAL

## 2022-01-06 ENCOUNTER — PATIENT MESSAGE (OUTPATIENT)
Dept: FAMILY MEDICINE | Facility: CLINIC | Age: 32
End: 2022-01-06
Payer: COMMERCIAL

## 2022-01-06 DIAGNOSIS — R05.9 COUGH: ICD-10-CM

## 2022-01-06 DIAGNOSIS — R05.9 COUGH: Primary | ICD-10-CM

## 2022-01-06 LAB
CTP QC/QA: YES
SARS-COV-2 AG RESP QL IA.RAPID: NEGATIVE

## 2022-01-06 PROCEDURE — 87426 SARS CORONAVIRUS 2 ANTIGEN POCT: ICD-10-PCS | Mod: QW,,, | Performed by: FAMILY MEDICINE

## 2022-01-06 PROCEDURE — 87426 SARSCOV CORONAVIRUS AG IA: CPT | Mod: QW,,, | Performed by: FAMILY MEDICINE

## 2022-01-06 NOTE — TELEPHONE ENCOUNTER
Please schedule patient for:   1. Covid-19 test  2. Chest Xray   3. See me next week at available time    Orders Placed This Encounter    X-Ray Chest PA And Lateral    SARS Coronavirus 2 Antigen, POCT     Dr. Dotty Virk D.O.   [facility]

## 2022-01-06 NOTE — PROGRESS NOTES
Patient tolerated test well, explained test may take up to 2 hours and can be found on [facility] pt verbalized understanding.

## 2022-01-07 ENCOUNTER — TELEPHONE (OUTPATIENT)
Dept: FAMILY MEDICINE | Facility: CLINIC | Age: 32
End: 2022-01-07
Payer: COMMERCIAL

## 2022-01-07 NOTE — TELEPHONE ENCOUNTER
----- Message from Dotty Virk DO sent at 1/6/2022  5:35 PM CST -----  Please notify Marshal Baer that covid-19. Please schedule Chest Xray and he can see me next week as scheduled

## 2022-01-08 DIAGNOSIS — Z87.11 HISTORY OF PEPTIC ULCER: ICD-10-CM

## 2022-01-08 DIAGNOSIS — K21.9 GASTROESOPHAGEAL REFLUX DISEASE WITHOUT ESOPHAGITIS: ICD-10-CM

## 2022-01-10 RX ORDER — PANTOPRAZOLE SODIUM 40 MG/1
40 TABLET, DELAYED RELEASE ORAL DAILY
Qty: 30 TABLET | Refills: 11 | Status: SHIPPED | OUTPATIENT
Start: 2022-01-10 | End: 2023-01-29 | Stop reason: SDUPTHER

## 2022-01-11 PROBLEM — R10.11 RUQ PAIN: Status: RESOLVED | Noted: 2020-08-07 | Resolved: 2022-01-11

## 2022-01-11 PROBLEM — N50.812 PAIN IN BOTH TESTICLES: Status: RESOLVED | Noted: 2021-04-19 | Resolved: 2022-01-11

## 2022-01-11 PROBLEM — K62.5 RECTAL BLEEDING: Status: RESOLVED | Noted: 2020-12-11 | Resolved: 2022-01-11

## 2022-01-11 PROBLEM — N50.811 PAIN IN BOTH TESTICLES: Status: RESOLVED | Noted: 2021-04-19 | Resolved: 2022-01-11
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
## 2022-01-12 ENCOUNTER — OFFICE VISIT (OUTPATIENT)
Dept: FAMILY MEDICINE | Facility: CLINIC | Age: 32
End: 2022-01-12
Payer: COMMERCIAL

## 2022-01-12 VITALS
RESPIRATION RATE: 18 BRPM | OXYGEN SATURATION: 98 % | DIASTOLIC BLOOD PRESSURE: 82 MMHG | WEIGHT: 275.13 LBS | BODY MASS INDEX: 36.46 KG/M2 | HEIGHT: 73 IN | HEART RATE: 69 BPM | SYSTOLIC BLOOD PRESSURE: 120 MMHG

## 2022-01-12 DIAGNOSIS — J31.0 CHRONIC RHINITIS: Primary | ICD-10-CM

## 2022-01-12 DIAGNOSIS — M77.8 LEFT HAND TENDONITIS: ICD-10-CM

## 2022-01-12 DIAGNOSIS — E66.09 CLASS 2 OBESITY DUE TO EXCESS CALORIES WITHOUT SERIOUS COMORBIDITY WITH BODY MASS INDEX (BMI) OF 36.0 TO 36.9 IN ADULT: ICD-10-CM

## 2022-01-12 DIAGNOSIS — R79.89 ELEVATED LFTS: ICD-10-CM

## 2022-01-12 DIAGNOSIS — R06.2 WHEEZING: ICD-10-CM

## 2022-01-12 DIAGNOSIS — M79.642 PAIN OF LEFT HAND: ICD-10-CM

## 2022-01-12 DIAGNOSIS — Z00.01 ENCOUNTER FOR GENERAL ADULT MEDICAL EXAMINATION WITH ABNORMAL FINDINGS: ICD-10-CM

## 2022-01-12 PROCEDURE — 3074F SYST BP LT 130 MM HG: CPT | Mod: CPTII,S$GLB,, | Performed by: FAMILY MEDICINE

## 2022-01-12 PROCEDURE — 3008F PR BODY MASS INDEX (BMI) DOCUMENTED: ICD-10-PCS | Mod: CPTII,S$GLB,, | Performed by: FAMILY MEDICINE

## 2022-01-12 PROCEDURE — 3074F PR MOST RECENT SYSTOLIC BLOOD PRESSURE < 130 MM HG: ICD-10-PCS | Mod: CPTII,S$GLB,, | Performed by: FAMILY MEDICINE

## 2022-01-12 PROCEDURE — 90686 FLU VACCINE (QUAD) GREATER THAN OR EQUAL TO 3YO PRESERVATIVE FREE IM: ICD-10-PCS | Mod: S$GLB,,, | Performed by: FAMILY MEDICINE

## 2022-01-12 PROCEDURE — 3079F DIAST BP 80-89 MM HG: CPT | Mod: CPTII,S$GLB,, | Performed by: FAMILY MEDICINE

## 2022-01-12 PROCEDURE — 3008F BODY MASS INDEX DOCD: CPT | Mod: CPTII,S$GLB,, | Performed by: FAMILY MEDICINE

## 2022-01-12 PROCEDURE — 90471 FLU VACCINE (QUAD) GREATER THAN OR EQUAL TO 3YO PRESERVATIVE FREE IM: ICD-10-PCS | Mod: S$GLB,,, | Performed by: FAMILY MEDICINE

## 2022-01-12 PROCEDURE — 99214 OFFICE O/P EST MOD 30 MIN: CPT | Mod: 25,S$GLB,, | Performed by: FAMILY MEDICINE

## 2022-01-12 PROCEDURE — 3079F PR MOST RECENT DIASTOLIC BLOOD PRESSURE 80-89 MM HG: ICD-10-PCS | Mod: CPTII,S$GLB,, | Performed by: FAMILY MEDICINE

## 2022-01-12 PROCEDURE — 99999 PR PBB SHADOW E&M-EST. PATIENT-LVL III: ICD-10-PCS | Mod: PBBFAC,,, | Performed by: FAMILY MEDICINE

## 2022-01-12 PROCEDURE — 1159F PR MEDICATION LIST DOCUMENTED IN MEDICAL RECORD: ICD-10-PCS | Mod: CPTII,S$GLB,, | Performed by: FAMILY MEDICINE

## 2022-01-12 PROCEDURE — 99999 PR PBB SHADOW E&M-EST. PATIENT-LVL III: CPT | Mod: PBBFAC,,, | Performed by: FAMILY MEDICINE

## 2022-01-12 PROCEDURE — 99214 PR OFFICE/OUTPT VISIT, EST, LEVL IV, 30-39 MIN: ICD-10-PCS | Mod: 25,S$GLB,, | Performed by: FAMILY MEDICINE

## 2022-01-12 PROCEDURE — 90471 IMMUNIZATION ADMIN: CPT | Mod: S$GLB,,, | Performed by: FAMILY MEDICINE

## 2022-01-12 PROCEDURE — 1159F MED LIST DOCD IN RCRD: CPT | Mod: CPTII,S$GLB,, | Performed by: FAMILY MEDICINE

## 2022-01-12 PROCEDURE — 90686 IIV4 VACC NO PRSV 0.5 ML IM: CPT | Mod: S$GLB,,, | Performed by: FAMILY MEDICINE

## 2022-01-12 RX ORDER — MONTELUKAST SODIUM 10 MG/1
10 TABLET ORAL NIGHTLY
Qty: 30 TABLET | Refills: 11 | Status: SHIPPED | OUTPATIENT
Start: 2022-01-12 | End: 2023-01-29 | Stop reason: SDUPTHER

## 2022-02-13 DIAGNOSIS — F41.9 ANXIETY: ICD-10-CM

## 2022-02-13 NOTE — TELEPHONE ENCOUNTER
No new care gaps identified.  Powered by UpTo [employer]. Reference number: [id number].   2/13/2022 9:55:08 AM CST

## 2022-02-14 RX ORDER — FLUOXETINE HYDROCHLORIDE 20 MG/1
20 CAPSULE ORAL DAILY
Qty: 90 CAPSULE | Refills: 4 | Status: SHIPPED | OUTPATIENT
Start: 2022-02-14 | End: 2022-12-27 | Stop reason: SDUPTHER

## 2022-02-21 ENCOUNTER — OFFICE VISIT (OUTPATIENT)
Dept: UROLOGY | Facility: CLINIC | Age: 32
End: 2022-02-21
Payer: COMMERCIAL

## 2022-02-21 VITALS
OXYGEN SATURATION: 97 % | DIASTOLIC BLOOD PRESSURE: 72 MMHG | SYSTOLIC BLOOD PRESSURE: 118 MMHG | HEART RATE: 66 BPM | WEIGHT: 273.81 LBS | BODY MASS INDEX: 36.29 KG/M2 | HEIGHT: 73 IN

## 2022-02-21 DIAGNOSIS — N40.1 BENIGN PROSTATIC HYPERPLASIA WITH URINARY FREQUENCY: Primary | ICD-10-CM

## 2022-02-21 DIAGNOSIS — R35.0 BENIGN PROSTATIC HYPERPLASIA WITH URINARY FREQUENCY: Primary | ICD-10-CM

## 2022-02-21 DIAGNOSIS — N50.3 EPIDIDYMAL CYST: ICD-10-CM

## 2022-02-21 PROCEDURE — 3008F PR BODY MASS INDEX (BMI) DOCUMENTED: ICD-10-PCS | Mod: CPTII,S$GLB,, | Performed by: NURSE PRACTITIONER

## 2022-02-21 PROCEDURE — 1159F MED LIST DOCD IN RCRD: CPT | Mod: CPTII,S$GLB,, | Performed by: NURSE PRACTITIONER

## 2022-02-21 PROCEDURE — 1160F PR REVIEW ALL MEDS BY PRESCRIBER/CLIN PHARMACIST DOCUMENTED: ICD-10-PCS | Mod: CPTII,S$GLB,, | Performed by: NURSE PRACTITIONER

## 2022-02-21 PROCEDURE — 1159F PR MEDICATION LIST DOCUMENTED IN MEDICAL RECORD: ICD-10-PCS | Mod: CPTII,S$GLB,, | Performed by: NURSE PRACTITIONER

## 2022-02-21 PROCEDURE — 3078F DIAST BP <80 MM HG: CPT | Mod: CPTII,S$GLB,, | Performed by: NURSE PRACTITIONER

## 2022-02-21 PROCEDURE — 3074F SYST BP LT 130 MM HG: CPT | Mod: CPTII,S$GLB,, | Performed by: NURSE PRACTITIONER

## 2022-02-21 PROCEDURE — 3078F PR MOST RECENT DIASTOLIC BLOOD PRESSURE < 80 MM HG: ICD-10-PCS | Mod: CPTII,S$GLB,, | Performed by: NURSE PRACTITIONER

## 2022-02-21 PROCEDURE — 3008F BODY MASS INDEX DOCD: CPT | Mod: CPTII,S$GLB,, | Performed by: NURSE PRACTITIONER

## 2022-02-21 PROCEDURE — 99214 PR OFFICE/OUTPT VISIT, EST, LEVL IV, 30-39 MIN: ICD-10-PCS | Mod: S$GLB,,, | Performed by: NURSE PRACTITIONER

## 2022-02-21 PROCEDURE — 3074F PR MOST RECENT SYSTOLIC BLOOD PRESSURE < 130 MM HG: ICD-10-PCS | Mod: CPTII,S$GLB,, | Performed by: NURSE PRACTITIONER

## 2022-02-21 PROCEDURE — 99214 OFFICE O/P EST MOD 30 MIN: CPT | Mod: S$GLB,,, | Performed by: NURSE PRACTITIONER

## 2022-02-21 PROCEDURE — 99999 PR PBB SHADOW E&M-EST. PATIENT-LVL IV: ICD-10-PCS | Mod: PBBFAC,,, | Performed by: NURSE PRACTITIONER

## 2022-02-21 PROCEDURE — 99999 PR PBB SHADOW E&M-EST. PATIENT-LVL IV: CPT | Mod: PBBFAC,,, | Performed by: NURSE PRACTITIONER

## 2022-02-21 PROCEDURE — 1160F RVW MEDS BY RX/DR IN RCRD: CPT | Mod: CPTII,S$GLB,, | Performed by: NURSE PRACTITIONER

## 2022-02-21 RX ORDER — TAMSULOSIN HYDROCHLORIDE 0.4 MG/1
0.4 CAPSULE ORAL DAILY
Qty: 30 CAPSULE | Refills: 11 | Status: SHIPPED | OUTPATIENT
Start: 2022-02-21 | End: 2022-08-01

## 2022-02-21 RX ORDER — COVID-19 TEST SPECIMEN COLLECT
MISCELLANEOUS MISCELLANEOUS
COMMUNITY
Start: 2021-12-11 | End: 2022-03-23

## 2022-02-21 NOTE — PATIENT INSTRUCTIONS
Continue taking tamsulosin (Flomax) 0.4 mg daily for BPH. REFILLED.  GIORGIO today  Follow-up in 1 year and as needed.

## 2022-02-21 NOTE — PROGRESS NOTES
Subjective:       Patient ID: Marshal Baer is a 31 y.o. male.    Chief Complaint: Annual Exam    Patient is here today for his annual urology exam. He has a hx of BPH. Currently taking tamsulosin 0.4 mg daily which is effective. Patient has no complaints at this time. Denies family hx of prostate cancer.     Benign Prostatic Hypertrophy  This is a chronic problem. The current episode started more than 1 year ago. The problem has been gradually improving since onset. Irritative symptoms do not include frequency, nocturia or urgency. Obstructive symptoms do not include dribbling, incomplete emptying, an intermittent stream, a slower stream, straining or a weak stream. Pertinent negatives include no chills, dysuria, hematuria, hesitancy, nausea or vomiting. He is sexually active. Nothing aggravates the symptoms. Past treatments include tamsulosin. The treatment provided significant relief.     Review of Systems   Constitutional: Negative for appetite change, chills, fatigue and fever.   Gastrointestinal: Negative for abdominal pain, change in bowel habit, constipation, diarrhea, nausea, vomiting and change in bowel habit.   Genitourinary: Negative for decreased urine volume, difficulty urinating, discharge, dysuria, flank pain, frequency, hematuria, hesitancy, incomplete emptying, nocturia, penile pain, penile swelling, scrotal swelling, testicular pain and urgency.   Neurological: Negative for dizziness, weakness and headaches.   Psychiatric/Behavioral: Negative.          Objective:      Physical Exam  Vitals and nursing note reviewed.   Constitutional:       General: He is not in acute distress.     Appearance: He is well-developed. He is obese. He is not ill-appearing.   HENT:      Head: Normocephalic and atraumatic.   Eyes:      Pupils: Pupils are equal, round, and reactive to light.   Cardiovascular:      Rate and Rhythm: Normal rate.   Pulmonary:      Effort: Pulmonary effort is normal. No respiratory  distress.   Abdominal:      Palpations: Abdomen is soft.      Tenderness: There is no abdominal tenderness.   Genitourinary:     Prostate: Enlarged (mildly). Not tender and no nodules present.   Musculoskeletal:         General: Normal range of motion.      Cervical back: Normal range of motion.   Skin:     General: Skin is warm and dry.   Neurological:      Mental Status: He is alert and oriented to person, place, and time.      Coordination: Coordination normal.   Psychiatric:         Mood and Affect: Mood normal.         Behavior: Behavior normal.         Thought Content: Thought content normal.         Judgment: Judgment normal.         Assessment:       Problem List Items Addressed This Visit        Renal/    Benign prostatic hyperplasia with urinary frequency - Primary    Relevant Medications    tamsulosin (FLOMAX) 0.4 mg Cap    Epididymal cyst          Plan:         Marshal was seen today for annual exam.    Diagnoses and all orders for this visit:    Benign prostatic hyperplasia with urinary frequency  -     tamsulosin (FLOMAX) 0.4 mg Cap; Take 1 capsule (0.4 mg total) by mouth once daily.    Epididymal cyst    Other orders  1. Continue taking tamsulosin (Flomax) 0.4 mg daily for BPH. REFILLED.  2. GIORGIO today    NOTE: Patient is considering coming off Flomax to see how he does without medication. He will keep me updated.     Follow-up in 1 year and as needed.     Delvis Corey NP

## 2022-03-08 ENCOUNTER — PATIENT MESSAGE (OUTPATIENT)
Dept: FAMILY MEDICINE | Facility: CLINIC | Age: 32
End: 2022-03-08
Payer: COMMERCIAL

## 2022-03-08 DIAGNOSIS — H91.91 HEARING LOSS OF RIGHT EAR, UNSPECIFIED HEARING LOSS TYPE: Primary | ICD-10-CM

## 2022-03-18 DIAGNOSIS — J31.0 CHRONIC RHINITIS: ICD-10-CM

## 2022-03-18 NOTE — TELEPHONE ENCOUNTER
No new care gaps identified.  Powered by servtag by [email or web address]. Reference number: [id number].   3/18/2022 7:47:43 AM CDT

## 2022-03-23 ENCOUNTER — PATIENT MESSAGE (OUTPATIENT)
Dept: FAMILY MEDICINE | Facility: CLINIC | Age: 32
End: 2022-03-23
Payer: COMMERCIAL

## 2022-03-23 ENCOUNTER — PATIENT MESSAGE (OUTPATIENT)
Dept: GASTROENTEROLOGY | Facility: CLINIC | Age: 32
End: 2022-03-23

## 2022-03-23 ENCOUNTER — OFFICE VISIT (OUTPATIENT)
Dept: GASTROENTEROLOGY | Facility: CLINIC | Age: 32
End: 2022-03-23
Payer: COMMERCIAL

## 2022-03-23 VITALS
WEIGHT: 273 LBS | SYSTOLIC BLOOD PRESSURE: 128 MMHG | HEIGHT: 73 IN | HEART RATE: 70 BPM | BODY MASS INDEX: 36.18 KG/M2 | DIASTOLIC BLOOD PRESSURE: 80 MMHG

## 2022-03-23 DIAGNOSIS — R10.11 RIGHT UPPER QUADRANT ABDOMINAL PAIN: Primary | ICD-10-CM

## 2022-03-23 DIAGNOSIS — K21.9 GASTROESOPHAGEAL REFLUX DISEASE WITHOUT ESOPHAGITIS: ICD-10-CM

## 2022-03-23 PROCEDURE — 3074F PR MOST RECENT SYSTOLIC BLOOD PRESSURE < 130 MM HG: ICD-10-PCS | Mod: CPTII,S$GLB,, | Performed by: NURSE PRACTITIONER

## 2022-03-23 PROCEDURE — 1160F RVW MEDS BY RX/DR IN RCRD: CPT | Mod: CPTII,S$GLB,, | Performed by: NURSE PRACTITIONER

## 2022-03-23 PROCEDURE — 99999 PR PBB SHADOW E&M-EST. PATIENT-LVL IV: ICD-10-PCS | Mod: PBBFAC,,, | Performed by: NURSE PRACTITIONER

## 2022-03-23 PROCEDURE — 1159F MED LIST DOCD IN RCRD: CPT | Mod: CPTII,S$GLB,, | Performed by: NURSE PRACTITIONER

## 2022-03-23 PROCEDURE — 1160F PR REVIEW ALL MEDS BY PRESCRIBER/CLIN PHARMACIST DOCUMENTED: ICD-10-PCS | Mod: CPTII,S$GLB,, | Performed by: NURSE PRACTITIONER

## 2022-03-23 PROCEDURE — 3008F PR BODY MASS INDEX (BMI) DOCUMENTED: ICD-10-PCS | Mod: CPTII,S$GLB,, | Performed by: NURSE PRACTITIONER

## 2022-03-23 PROCEDURE — 99999 PR PBB SHADOW E&M-EST. PATIENT-LVL IV: CPT | Mod: PBBFAC,,, | Performed by: NURSE PRACTITIONER

## 2022-03-23 PROCEDURE — 3074F SYST BP LT 130 MM HG: CPT | Mod: CPTII,S$GLB,, | Performed by: NURSE PRACTITIONER

## 2022-03-23 PROCEDURE — 99214 OFFICE O/P EST MOD 30 MIN: CPT | Mod: S$GLB,,, | Performed by: NURSE PRACTITIONER

## 2022-03-23 PROCEDURE — 3079F DIAST BP 80-89 MM HG: CPT | Mod: CPTII,S$GLB,, | Performed by: NURSE PRACTITIONER

## 2022-03-23 PROCEDURE — 1159F PR MEDICATION LIST DOCUMENTED IN MEDICAL RECORD: ICD-10-PCS | Mod: CPTII,S$GLB,, | Performed by: NURSE PRACTITIONER

## 2022-03-23 PROCEDURE — 99214 PR OFFICE/OUTPT VISIT, EST, LEVL IV, 30-39 MIN: ICD-10-PCS | Mod: S$GLB,,, | Performed by: NURSE PRACTITIONER

## 2022-03-23 PROCEDURE — 3079F PR MOST RECENT DIASTOLIC BLOOD PRESSURE 80-89 MM HG: ICD-10-PCS | Mod: CPTII,S$GLB,, | Performed by: NURSE PRACTITIONER

## 2022-03-23 PROCEDURE — 3008F BODY MASS INDEX DOCD: CPT | Mod: CPTII,S$GLB,, | Performed by: NURSE PRACTITIONER

## 2022-03-23 RX ORDER — LEVOCETIRIZINE DIHYDROCHLORIDE 5 MG/1
5 TABLET, FILM COATED ORAL NIGHTLY
Qty: 90 TABLET | Refills: 3 | Status: SHIPPED | OUTPATIENT
Start: 2022-03-23 | End: 2022-06-22

## 2022-03-23 RX ORDER — DICYCLOMINE HYDROCHLORIDE 20 MG/1
20 TABLET ORAL 3 TIMES DAILY PRN
Qty: 60 TABLET | Refills: 2 | Status: SHIPPED | OUTPATIENT
Start: 2022-03-23 | End: 2022-08-01

## 2022-03-23 NOTE — PROGRESS NOTES
Subjective:       Patient ID: Marshal Baer is a 31 y.o. male.    Chief Complaint: Abdominal Pain and Follow-up    32 y/o male presents to clinic with c/o abdominal pain. [facility] ED visit today for abdominal pain. Imaging and labs negative for any acute findings that would explain symptoms. He reports intermittent sharp, stabbing pain to RUQ abdomen. Symptoms do not seem related to food intake. Denies nausea or vomiting. Tried Pepto Bismol with minimal symptom relief. EGD in 2020 showed normal esophageal mucosa; small hiatal hernia with significant reflux noted on this exam; Erythematous mucosa in the antrum; normal examined duodenum.      Abdominal Pain  This is a recurrent problem. The current episode started more than 1 month ago. The onset quality is gradual. The problem occurs intermittently. The problem has been waxing and waning. The pain is located in the RUQ. The pain is at a severity of 6/10. The quality of the pain is sharp. The abdominal pain does not radiate. Pertinent negatives include no anorexia, belching, constipation, diarrhea, dysuria, hematochezia, melena, nausea, vomiting or weight loss. Nothing aggravates the pain. The pain is relieved by nothing. He has tried antacids for the symptoms. The treatment provided mild relief. Prior diagnostic workup includes CT scan and upper endoscopy. His past medical history is significant for GERD.       Past Medical History:   Diagnosis Date    Anxiety     Deafness in right ear     since birth    Varicocele 8/17/2016       Past Surgical History:   Procedure Laterality Date    COLONOSCOPY N/A 1/12/2021    Procedure: COLONOSCOPY;  Surgeon: Valentina Patton MD;  Location: [facility];  Service: Endoscopy;  Laterality: N/A;    ENDOSCOPY  11/2019    ESOPHAGOGASTRODUODENOSCOPY N/A 8/7/2020    Procedure: EGD (ESOPHAGOGASTRODUODENOSCOPY);  Surgeon: Valentina Patton MD;  Location: [facility];  Service: Endoscopy;  Laterality: N/A;    VASECTOMY   12/27/2019       Family History   Problem Relation Age of Onset    Hypertension Mother     Hypertension Father     Diabetes Father     Heart disease Paternal Uncle 42        Heart Attack in 1999    Cancer Maternal Grandmother         Breast  cancer 2006    No Known Problems Maternal Grandfather     Prostate cancer Neg Hx     Kidney disease Neg Hx        Social History     Socioeconomic History    Marital status:     Number of children: 2   Occupational History     Employer: [employer]   Tobacco Use    Smoking status: Never Smoker    Smokeless tobacco: Never Used   Substance and Sexual Activity    Alcohol use: Yes     Comment: rarely    Drug use: No    Sexual activity: Yes     Partners: Female   Social History Narrative    Lives in [place] with his wife and son born in 2017. He was  in December 22, 2014. He was employed by the [employer]. 2 children (son and daughter born 2019).      Social Determinants of Health     Financial Resource Strain: Low Risk     Difficulty of Paying Living Expenses: Not hard at all   Food Insecurity: No Food Insecurity    Worried About Running Out of Food in the Last Year: Never true    Ran Out of Food in the Last Year: Never true   Transportation Needs: No Transportation Needs    Lack of Transportation (Medical): No    Lack of Transportation (Non-Medical): No   Physical Activity: Sufficiently Active    Days of Exercise per Week: 4 days    Minutes of Exercise per Session: 60 min   Stress: No Stress Concern Present    Feeling of Stress : Not at all   Social Connections: Unknown    Frequency of Communication with Friends and Family: More than three times a week    Frequency of Social Gatherings with Friends and Family: Never    Active Member of Clubs or Organizations: No    Attends Club or Organization Meetings: Never    Marital Status:    Housing Stability: Low Risk     Unable to Pay for Housing  "in the Last Year: No    Number of Places Lived in the Last Year: 2    Unstable Housing in the Last Year: No       Review of Systems   Constitutional: Negative for appetite change, unexpected weight change and weight loss.   HENT: Negative for trouble swallowing.    Respiratory: Negative for shortness of breath.    Cardiovascular: Negative for chest pain.   Gastrointestinal: Positive for abdominal pain. Negative for anorexia, constipation, diarrhea, hematochezia, melena, nausea and vomiting.   Genitourinary: Negative for dysuria.   Neurological: Negative for dizziness and weakness.   Hematological: Negative for adenopathy. Does not bruise/bleed easily.   Psychiatric/Behavioral: Negative for dysphoric mood.         Objective:     Vitals:    03/23/22 1415   BP: 128/80   Pulse: 70   Weight: 123.8 kg (273 lb)   Height: 6' 1" (1.854 m)          Physical Exam  Constitutional:       General: He is not in acute distress.     Appearance: Normal appearance.   HENT:      Head: Normocephalic.   Eyes:      Conjunctiva/sclera: Conjunctivae normal.      Pupils: Pupils are equal, round, and reactive to light.   Pulmonary:      Effort: Pulmonary effort is normal. No respiratory distress.   Abdominal:      General: Abdomen is flat. There is no distension.      Tenderness: There is abdominal tenderness in the right upper quadrant. There is no guarding or rebound. Negative signs include Sinha's sign.   Musculoskeletal:         General: Normal range of motion.      Cervical back: Normal range of motion.   Skin:     General: Skin is warm and dry.      Coloration: Skin is not jaundiced.   Neurological:      Mental Status: He is alert and oriented to person, place, and time.   Psychiatric:         Mood and Affect: Mood normal.         Behavior: Behavior normal.               Assessment:         ICD-10-CM ICD-9-CM   1. Right upper quadrant abdominal pain  R10.11 789.01   2. Gastroesophageal reflux disease without esophagitis  K21.9 530.81 "       Plan:       Right upper quadrant abdominal pain  -     NM Hepatobiliary (HIDA) W Pharm and Ejec; Future; Expected date: 03/23/2022  -     dicyclomine (BENTYL) 20 mg tablet; Take 1 tablet (20 mg total) by mouth 3 (three) times daily as needed (abdominal pain).  Dispense: 60 tablet; Refill: 2    Gastroesophageal reflux disease without esophagitis        -     Continue pantoprazole 40 mg daily    Follow up if symptoms worsen or fail to improve.     Patient's Medications   New Prescriptions    DICYCLOMINE (BENTYL) 20 MG TABLET    Take 1 tablet (20 mg total) by mouth 3 (three) times daily as needed (abdominal pain).   Previous Medications    AZELASTINE (ASTELIN) 137 MCG (0.1 %) NASAL SPRAY    azelastine 137 mcg (0.1 %) nasal spray aerosol   Spray 1 spray twice a day by intranasal route as needed.    FLUOXETINE 20 MG CAPSULE    Take 1 capsule (20 mg total) by mouth once daily.    LEVOCETIRIZINE (XYZAL) 5 MG TABLET    Take 1 tablet (5 mg total) by mouth every evening.    MONTELUKAST (SINGULAIR) 10 MG TABLET    Take 1 tablet (10 mg total) by mouth every evening.    PANTOPRAZOLE (PROTONIX) 40 MG TABLET    Take 1 tablet (40 mg total) by mouth once daily.    TAMSULOSIN (FLOMAX) 0.4 MG CAP    Take 1 capsule (0.4 mg total) by mouth once daily.   Modified Medications    No medications on file   Discontinued Medications    COVID-19 TEST SPECIMEN COLLECT MISC    TEST AS DIRECTED TODAY    FAMOTIDINE (PEPCID) 20 MG TABLET    Take 1 tablet (20 mg total) by mouth 2 (two) times daily.    MAG CARB/ALUMINUM HYDROX/ALGIN (GAVISCON EXTRA STRENGTH ORAL)    Take by mouth as needed.    SUCRALFATE (CARAFATE) 100 MG/ML SUSPENSION    Take 10 mLs (1 g total) by mouth 4 (four) times daily. for 7 days

## 2022-03-24 NOTE — TELEPHONE ENCOUNTER
Refill Authorization Note   Marshal Baer  is requesting a refill authorization.  Brief Assessment and Rationale for Refill:  Approve     Medication Therapy Plan:       Medication Reconciliation Completed: No   Comments:   --->Care Gap information included below if applicable.       Requested Prescriptions   Pending Prescriptions Disp Refills    levocetirizine (XYZAL) 5 MG tablet 90 tablet 3     Sig: Take 1 tablet (5 mg total) by mouth every evening.       Ear, Nose, and Throat:  Antihistamines Passed - 3/23/2022  7:28 PM        Passed - Patient is at least 18 years old        Passed - Valid encounter within last 15 months     Recent Visits  Date Type Provider Dept   01/12/22 Office Visit Dotty Virk, DO [facility]   01/04/21 Office Visit Dotty Virk DO [facility]   Showing recent visits within past 720 days and meeting all other requirements  Future Appointments  No visits were found meeting these conditions.  Showing future appointments within next 150 days and meeting all other requirements      Future Appointments              In 1 week Linn Henderson MA CCC-A [facility]    In 1 week Sheila Beavers MD [facility]    In 2 weeks HOSPITAL LAB, [facility], [facility]    In 2 weeks [facility] NM1 GE INFINIA LIMIT 430LBS [facility] - Imaging, [facility]    In 3 weeks Dotty Virk DO [facility], [facility]                    Appointments  past 12m or future 3m with PCP    Date Provider   Last Visit   1/12/2022 Dotty Virk, DO   Next Visit   4/13/2022 Dotty Virk DO   ED visits in past 90 days: 1     Note composed:7:29 PM 03/23/2022

## 2022-03-31 NOTE — PROGRESS NOTES
Chief Complaint   Patient presents with    Hearing Loss     Hearing screening, declining on right side    .     HPI:  Marshal Baer is a very pleasant 31 y.o. male here to see me today for the first time for evaluation of hearing loss.  He reports hearing loss that has been present since childhood in the right ear.  He has noted any difference in hearing between the ears, with the right ear being the worse hearing ear.  He has noted any tinnitus in the left ear.  He has not had any recent issues with ear pain or ear drainage.  He denies a family history of hearing loss, and has not had any previous otologic surgery. He admits to any history of significant loud noise exposure. He wears hearing protection intermittently.  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 He reports that he had an MRI at age 8 at was told there was nothing to be done for the right ear at the time.       Past Medical History:   Diagnosis Date    Anxiety     Deafness in right ear     since birth    Varicocele 8/17/2016     Social History     Socioeconomic History    Marital status:     Number of children: 2   Occupational History     Employer: [employer]   Tobacco Use    Smoking status: Never Smoker    Smokeless tobacco: Never Used   Substance and Sexual Activity    Alcohol use: Yes     Comment: rarely    Drug use: No    Sexual activity: Yes     Partners: Female   Social History Narrative    Lives in [place] with his wife and son born in 2017. He was  in December 22, 2014. He was employed by the [employer]. 2 children (son and daughter born 2019).      Social Determinants of Health     Financial Resource Strain: Low Risk     Difficulty of Paying Living Expenses: Not hard at all   Food Insecurity: No Food Insecurity    Worried About Running Out of Food in the Last Year: Never true    Ran Out of Food in the Last Year: Never true   Transportation Needs: No  Transportation Needs    Lack of Transportation (Medical): No    Lack of Transportation (Non-Medical): No   Physical Activity: Sufficiently Active    Days of Exercise per Week: 4 days    Minutes of Exercise per Session: 60 min   Stress: No Stress Concern Present    Feeling of Stress : Not at all   Social Connections: Unknown    Frequency of Communication with Friends and Family: More than three times a week    Frequency of Social Gatherings with Friends and Family: Never    Active Member of Clubs or Organizations: No    Attends Club or Organization Meetings: Never    Marital Status:    Housing Stability: Low Risk     Unable to Pay for Housing in the Last Year: No    Number of Places Lived in the Last Year: 2    Unstable Housing in the Last Year: No     Past Surgical History:   Procedure Laterality Date    COLONOSCOPY N/A 1/12/2021    Procedure: COLONOSCOPY;  Surgeon: Valentina Patton MD;  Location: [facility];  Service: Endoscopy;  Laterality: N/A;    ENDOSCOPY  11/2019    ESOPHAGOGASTRODUODENOSCOPY N/A 8/7/2020    Procedure: EGD (ESOPHAGOGASTRODUODENOSCOPY);  Surgeon: Valentina Patton MD;  Location: [facility];  Service: Endoscopy;  Laterality: N/A;    VASECTOMY  12/27/2019     Family History   Problem Relation Age of Onset    Hypertension Mother     Hypertension Father     Diabetes Father     Heart disease Paternal Uncle 42        Heart Attack in 1999    Cancer Maternal Grandmother         Breast  cancer 2006    No Known Problems Maternal Grandfather     Prostate cancer Neg Hx     Kidney disease Neg Hx          Review of Systems  General: negative for chills, fever or weight loss  Psychological: negative for mood changes or depression  Ophthalmic: negative for blurry vision, photophobia or eye pain  ENT: see HPI  Respiratory: no cough, shortness of breath, or wheezing  Cardiovascular: no chest pain or dyspnea on exertion  Gastrointestinal: no abdominal pain, change in bowel  habits, or black/ bloody stools  Musculoskeletal: negative for gait disturbance or muscular weakness  Neurological: no syncope or seizures; no ataxia  Dermatological: negative for puritis,  rash and jaundice  Hematologic/lymphatic: no easy bruising, no new lumps or bumps      Physical Exam:    Vitals:    04/01/22 0956   BP: (!) 140/90   Pulse: 63       Constitutional: Well appearing / communicating without difficutly.  NAD.  Eyes: EOM I Bilaterally  Head/Face: Normocephalic.  Negative paranasal sinus pressure/tenderness.  Salivary glands WNL.  House Brackmann I Bilaterally.    Right Ear: Auricle normal appearance. External Auditory Canal within normal limits no lesions or masses,TM w/o masses/lesions/perforations. TM mobility noted.   Left Ear: Auricle normal appearance. External Auditory Canal within normal limits no lesions or masses,TM w/o masses/lesions/perforations. TM mobility noted.  Rinne Air conduction >bone conduction bilaterally, Leung midline.   Nose: No gross nasal septal deviation. Inferior Turbinates 3+ bilaterally. No septal perforation. No masses/lesions. External nasal skin appears normal without masses/lesions.  Oral Cavity: Gingiva/lips within normal limits.  Dentition/gingiva healthy appearing. Mucus membranes moist. Floor of mouth soft, no masses palpated. Oral Tongue mobile. Hard Palate appears normal.    Oropharynx: Base of tongue appears normal. No masses/lesions noted. Tonsillar fossa/pharyngeal wall without lesions. Posterior oropharynx WNL.  Soft palate without masses. Midline uvula.   Neck/Lymphatic: No LAD I-VI bilaterally.  No thyromegaly.  No masses noted on exam.      Diagnostic studies:  Audiogram interpreted personally by me and discussed in detail with the patient today.           Assessment:    ICD-10-CM ICD-9-CM    1. Mixed conductive and sensorineural hearing loss of right ear with restricted hearing of left ear  H90.A31 389.22    2. Sensorineural hearing loss (SNHL) of left  ear with restricted hearing of right ear  H90.A22 389.22      The primary encounter diagnosis was Mixed conductive and sensorineural hearing loss of right ear with restricted hearing of left ear. A diagnosis of Sensorineural hearing loss (SNHL) of left ear with restricted hearing of right ear was also pertinent to this visit.      Plan:  No orders of the defined types were placed in this encounter.    We discussed his audiogram in detail. I explained options of amplification via CROS aid or consultation with neurotology for BAHA as another option to augment hearing.  Regardless of his choice, I recommend hearing protection in noise and annual audiograms to monitor hearing of the left ear due to only hearing ear and mild HFHL that has started.   He would like to refer to [facility] for evaluation for BAHA    Thank you kindly for allowing me to participate in the patient's care.       Sheila Beavers MD

## 2022-04-01 ENCOUNTER — CLINICAL SUPPORT (OUTPATIENT)
Dept: OTOLARYNGOLOGY | Facility: CLINIC | Age: 32
End: 2022-04-01
Payer: COMMERCIAL

## 2022-04-01 ENCOUNTER — OFFICE VISIT (OUTPATIENT)
Dept: OTOLARYNGOLOGY | Facility: CLINIC | Age: 32
End: 2022-04-01
Payer: COMMERCIAL

## 2022-04-01 VITALS
DIASTOLIC BLOOD PRESSURE: 90 MMHG | SYSTOLIC BLOOD PRESSURE: 140 MMHG | BODY MASS INDEX: 36.69 KG/M2 | HEART RATE: 63 BPM | HEIGHT: 73 IN | WEIGHT: 276.81 LBS

## 2022-04-01 DIAGNOSIS — H90.A31 MIXED CONDUCTIVE AND SENSORINEURAL HEARING LOSS OF RIGHT EAR WITH RESTRICTED HEARING OF LEFT EAR: Primary | ICD-10-CM

## 2022-04-01 DIAGNOSIS — H90.A22 SENSORINEURAL HEARING LOSS (SNHL) OF LEFT EAR WITH RESTRICTED HEARING OF RIGHT EAR: Chronic | ICD-10-CM

## 2022-04-01 DIAGNOSIS — H90.A31 MIXED CONDUCTIVE AND SENSORINEURAL HEARING LOSS OF RIGHT EAR WITH RESTRICTED HEARING OF LEFT EAR: Primary | Chronic | ICD-10-CM

## 2022-04-01 PROCEDURE — 3077F SYST BP >= 140 MM HG: CPT | Mod: CPTII,S$GLB,, | Performed by: OTOLARYNGOLOGY

## 2022-04-01 PROCEDURE — 3080F PR MOST RECENT DIASTOLIC BLOOD PRESSURE >= 90 MM HG: ICD-10-PCS | Mod: CPTII,S$GLB,, | Performed by: OTOLARYNGOLOGY

## 2022-04-01 PROCEDURE — 3008F PR BODY MASS INDEX (BMI) DOCUMENTED: ICD-10-PCS | Mod: CPTII,S$GLB,, | Performed by: OTOLARYNGOLOGY

## 2022-04-01 PROCEDURE — 99204 PR OFFICE/OUTPT VISIT, NEW, LEVL IV, 45-59 MIN: ICD-10-PCS | Mod: S$GLB,,, | Performed by: OTOLARYNGOLOGY

## 2022-04-01 PROCEDURE — 3008F BODY MASS INDEX DOCD: CPT | Mod: CPTII,S$GLB,, | Performed by: OTOLARYNGOLOGY

## 2022-04-01 PROCEDURE — 92567 PR TYMPA2METRY: ICD-10-PCS | Mod: S$GLB,,, | Performed by: AUDIOLOGIST

## 2022-04-01 PROCEDURE — 99999 PR PBB SHADOW E&M-EST. PATIENT-LVL I: CPT | Mod: PBBFAC,,, | Performed by: AUDIOLOGIST

## 2022-04-01 PROCEDURE — 3080F DIAST BP >= 90 MM HG: CPT | Mod: CPTII,S$GLB,, | Performed by: OTOLARYNGOLOGY

## 2022-04-01 PROCEDURE — 92567 TYMPANOMETRY: CPT | Mod: S$GLB,,, | Performed by: AUDIOLOGIST

## 2022-04-01 PROCEDURE — 1160F PR REVIEW ALL MEDS BY PRESCRIBER/CLIN PHARMACIST DOCUMENTED: ICD-10-PCS | Mod: CPTII,S$GLB,, | Performed by: OTOLARYNGOLOGY

## 2022-04-01 PROCEDURE — 1159F MED LIST DOCD IN RCRD: CPT | Mod: CPTII,S$GLB,, | Performed by: OTOLARYNGOLOGY

## 2022-04-01 PROCEDURE — 99204 OFFICE O/P NEW MOD 45 MIN: CPT | Mod: S$GLB,,, | Performed by: OTOLARYNGOLOGY

## 2022-04-01 PROCEDURE — 3077F PR MOST RECENT SYSTOLIC BLOOD PRESSURE >= 140 MM HG: ICD-10-PCS | Mod: CPTII,S$GLB,, | Performed by: OTOLARYNGOLOGY

## 2022-04-01 PROCEDURE — 92557 PR COMPREHENSIVE HEARING TEST: ICD-10-PCS | Mod: S$GLB,,, | Performed by: AUDIOLOGIST

## 2022-04-01 PROCEDURE — 1159F PR MEDICATION LIST DOCUMENTED IN MEDICAL RECORD: ICD-10-PCS | Mod: CPTII,S$GLB,, | Performed by: OTOLARYNGOLOGY

## 2022-04-01 PROCEDURE — 92557 COMPREHENSIVE HEARING TEST: CPT | Mod: S$GLB,,, | Performed by: AUDIOLOGIST

## 2022-04-01 PROCEDURE — 99999 PR PBB SHADOW E&M-EST. PATIENT-LVL III: CPT | Mod: PBBFAC,,, | Performed by: OTOLARYNGOLOGY

## 2022-04-01 PROCEDURE — 1160F RVW MEDS BY RX/DR IN RCRD: CPT | Mod: CPTII,S$GLB,, | Performed by: OTOLARYNGOLOGY

## 2022-04-01 PROCEDURE — 99999 PR PBB SHADOW E&M-EST. PATIENT-LVL I: ICD-10-PCS | Mod: PBBFAC,,, | Performed by: AUDIOLOGIST

## 2022-04-01 PROCEDURE — 99999 PR PBB SHADOW E&M-EST. PATIENT-LVL III: ICD-10-PCS | Mod: PBBFAC,,, | Performed by: OTOLARYNGOLOGY

## 2022-04-01 NOTE — PROGRESS NOTES
Marshal Baer was seen today in the clinic for an audiologic evaluation for hearing loss.  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    Tympanometry revealed a Type A tympanogram for both ears.  Audiogram results revealed a mild high frequency hearing loss for the left ear and a profound mixed hearing loss for the right ear.  Speech reception thresholds were obtained at 95dB for the right ear and 10dB for the left ear.  Speech discrimination score was 100% for the left ear and could not be tested for the right ear.    Recommendations:  1. Otologic evaluation  2. Annual evaluation  3. Hearing aid consult   4. Hearing protection in noise

## 2022-04-04 ENCOUNTER — PATIENT MESSAGE (OUTPATIENT)
Dept: GASTROENTEROLOGY | Facility: CLINIC | Age: 32
End: 2022-04-04
Payer: COMMERCIAL

## 2022-04-12 PROBLEM — M79.642 PAIN OF LEFT HAND: Status: RESOLVED | Noted: 2022-01-12 | Resolved: 2022-04-12

## 2022-04-12 PROBLEM — K76.0 FATTY LIVER: Status: ACTIVE | Noted: 2022-04-12

## 2022-04-12 PROBLEM — M77.8 LEFT HAND TENDONITIS: Status: RESOLVED | Noted: 2022-01-12 | Resolved: 2022-04-12

## 2022-04-12 PROBLEM — H90.6 MIXED CONDUCTIVE AND SENSORINEURAL HEARING LOSS OF BOTH EARS: Status: ACTIVE | Noted: 2022-04-12

## 2022-04-12 PROBLEM — R06.2 WHEEZING: Status: RESOLVED | Noted: 2022-01-12 | Resolved: 2022-04-12

## 2022-04-12 NOTE — PROGRESS NOTES
"FAMILY MEDICINE  [facility]  [facility]GORDON NOBLES    Reason for visit:   Chief Complaint   Patient presents with    Follow-up       SUBJECTIVE: Marshal Baer is a 31 y.o. male  - with obesity, GERD, chronic rhinitis, anxiety, depression, fatty liver, hearing loss (since childhood right greater than left) and history of covid-19 (7/2021) presents for follow-up anxiety and depression as well as recent ER visits and evaluation by ENT and Gastroenterology    Gastroenterology: Dr. Patton and AYALA Colon  Urology:  Dr. Swann  ENT Dr. Krishnamurthy   - reviewed 4/1/22 note and diagnosed with mixed conductive and neurosensory hearing loss and plans for implant    Marshal Baer was recently seen in the ER 3/23/22 for stabbing RUQ pain. Imagined and labs were benign. He did see his GI in follow-up on 3/23/22 and HIDA scan was ordered. HIDA was completed 4/11/2022 and normal (I reviewed ER and GI evaluations)     Today he reports reports he has been doing well since starting dicyclomine.  He still gets intermittent pain however he reports that is in controlled by the medication.  His HIDA scan was normal and he was told by gastroenterology that he has functional dyspepsia.    1. Anxiety and depression     Age diagnosed: since teenage years   - 2008 anger management classes     Psychiatrist: none  Psychologist: none  Counselor : none     Today 4/13/22:  Reports he has been doing well.  He is tolerating medication well he wants to continue taking fluoxetine 20 mg daily.  He has had increased irritability and anxiety when off of medication.     Prior note   01/04/2021: reports that medication has been effective and less irritable. Reports that wife notes improvement. Denies any unwanted side effects  01/31/2020:  "- and both he and wife note that he has a "bad temper." He has never hurt or struck anyone including her or their children but he will easily lose his temper and often yell, punch a wall, and " "throw things  - his parents have a similar behavior when they argue and though he is aware that it is not appropriate, he has found it difficult to adjust his behavior. He has looked into finding a counselor but the wait list was very long and he was unable to get in to speak with anyone  - he denies depression, thoughts of self harm or harm to others"     Prior treatments: escitalopram oxalate (LEXAPRO) 20 MG tablet, Take 20 mg by mouth once daily., Disp: , Rfl:  - stopped about 3 weeks ago and reports that was not effects  - side effects impotence but he and wife report that it was tolerable and more concerned that medication did not help  Side effects of prior treatment: see above     Current treatment:   FLUoxetine 20 MG capsule, Take 1 capsule by mouth once daily, Disp: 90 capsule, Rfl: 0     Side effects of current treatment: denies     Panic attacks: denies   Hopelessness: denies  Sleep: good  Suicidal thoughts: denies  Thoughts of self harm:denies  Thoughts of harm to others: denies  History of suicide attempts: denies  Family history of suicide:denies     Support system: wife and friends         Review of Systems   All other systems reviewed and are negative.      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    Varicocele 8/17/2016       Past Surgical History:   Procedure Laterality Date    COLONOSCOPY N/A 1/12/2021    Procedure: COLONOSCOPY;  Surgeon: Valentina Patton MD;  [facility];  Service: Endoscopy;  Laterality: N/A;    ENDOSCOPY  11/2019    ESOPHAGOGASTRODUODENOSCOPY N/A " 8/7/2020    Procedure: EGD (ESOPHAGOGASTRODUODENOSCOPY);  Surgeon: Valentina Patton MD;  Location[facility];  Service: Endoscopy;  Laterality: N/A;    VASECTOMY  12/27/2019       Family History   Problem Relation Age of Onset    Hypertension Mother     Hypertension Father     Diabetes Father     Heart disease Paternal Uncle 42        Heart Attack in 1999    Cancer Maternal Grandmother         Breast  cancer 2006    No Known Problems Maternal Grandfather     Prostate cancer Neg Hx     Kidney disease Neg Hx        Social History     Tobacco Use    Smoking status: Never Smoker    Smokeless tobacco: Never Used   Substance Use Topics    Alcohol use: Yes     Comment: rarely    Drug use: No       Social History     Social History Narrative    Lives in [place] with his wife and son born in 2017. He was  in December 22, 2014. He was employed by the [employer]. 2 children (son and daughter born 2019).        ALLERGIES:   Review of patient's allergies indicates:   Allergen Reactions    Tetracycline Other (See Comments)       MEDS:     Current Outpatient Medications:     azelastine (ASTELIN) 137 mcg (0.1 %) nasal spray, azelastine 137 mcg (0.1 %) nasal spray aerosol  Spray 1 spray twice a day by intranasal route as needed., Disp: , Rfl:     dicyclomine (BENTYL) 20 mg tablet, Take 1 tablet (20 mg total) by mouth 3 (three) times daily as needed (abdominal pain)., Disp: 60 tablet, Rfl: 2    FLUoxetine 20 MG capsule, Take 1 capsule (20 mg total) by mouth once daily., Disp: 90 capsule, Rfl: 4    levocetirizine (XYZAL) 5 MG tablet, Take 1 tablet (5 mg total) by mouth every evening., Disp: 90 tablet, Rfl: 3    montelukast (SINGULAIR) 10 mg tablet, Take 1 tablet (10 mg total) by mouth every evening., Disp: 30 tablet, Rfl: 11    pantoprazole (PROTONIX) 40 MG tablet, Take 1 tablet (40 mg total) by mouth once daily., Disp: 30 tablet, Rfl: 11    tamsulosin (FLOMAX) 0.4 mg Cap, Take 1  "capsule (0.4 mg total) by mouth once daily., Disp: 30 capsule, Rfl: 11      Vital signs:   Vitals:    04/13/22 1107   BP: 126/70   Pulse: 65   SpO2: 98%   Weight: 124.4 kg (274 lb 4.8 oz)   Height: 6' 1" (1.854 m)     Body mass index is 36.19 kg/m².  PHYSICAL EXAM:     Physical Exam  Vitals reviewed.   Constitutional:       General: He is not in acute distress.     Appearance: Normal appearance.   HENT:      Head: Normocephalic and atraumatic.      Right Ear: Tympanic membrane and ear canal normal.      Left Ear: Tympanic membrane and ear canal normal.      Nose: Nose normal.      Mouth/Throat:      Pharynx: Uvula midline.   Eyes:      General: No scleral icterus.     Conjunctiva/sclera: Conjunctivae normal.      Pupils: Pupils are equal, round, and reactive to light.   Neck:      Thyroid: No thyromegaly.      Vascular: Normal carotid pulses. No carotid bruit or JVD.      Trachea: Trachea normal.   Cardiovascular:      Rate and Rhythm: Regular rhythm.      Heart sounds: Normal heart sounds. No murmur heard.  Pulmonary:      Effort: Pulmonary effort is normal.      Breath sounds: Normal breath sounds. No decreased breath sounds, wheezing, rhonchi or rales.   Abdominal:      General: Bowel sounds are normal.      Palpations: Abdomen is soft.      Tenderness: There is no abdominal tenderness.   Musculoskeletal:      Cervical back: Neck supple.      Right lower leg: No edema.      Left lower leg: No edema.   Lymphadenopathy:      Cervical: No cervical adenopathy.   Skin:     General: Skin is warm.      Capillary Refill: Capillary refill takes less than 2 seconds.      Nails: There is no clubbing.   Neurological:      General: No focal deficit present.      Mental Status: He is alert and oriented to person, place, and time.   Psychiatric:         Mood and Affect: Mood normal.         Speech: Speech normal.         Behavior: Behavior normal.           PHQ4 = Score: 0    PERTINENT RESULTS:   Lab Visit on 04/08/2022 "   Component Date Value Ref Range Status    WBC 04/08/2022 7.45  3.90 - 12.70 K/uL Final    RBC 04/08/2022 5.48  4.60 - 6.20 M/uL Final    Hemoglobin 04/08/2022 15.6  14.0 - 18.0 g/dL Final    Hematocrit 04/08/2022 47.3  40.0 - 54.0 % Final    MCV 04/08/2022 86  82 - 98 fL Final    MCH 04/08/2022 28.5  27.0 - 31.0 pg Final    MCHC 04/08/2022 33.0  32.0 - 36.0 g/dL Final    RDW 04/08/2022 12.3  11.5 - 14.5 % Final    Platelets 04/08/2022 193  150 - 450 K/uL Final    MPV 04/08/2022 10.8  9.2 - 12.9 fL Final    Sodium 04/08/2022 144  136 - 145 mmol/L Final    Potassium 04/08/2022 4.1  3.5 - 5.1 mmol/L Final    Chloride 04/08/2022 108  95 - 110 mmol/L Final    CO2 04/08/2022 28  23 - 29 mmol/L Final    Glucose 04/08/2022 100  70 - 110 mg/dL Final    BUN 04/08/2022 16  2 - 20 mg/dL Final    Creatinine 04/08/2022 0.87  0.50 - 1.40 mg/dL Final    Calcium 04/08/2022 8.6 (A) 8.7 - 10.5 mg/dL Final    Total Protein 04/08/2022 7.5  6.0 - 8.4 g/dL Final    Albumin 04/08/2022 4.5  3.5 - 5.2 g/dL Final    Total Bilirubin 04/08/2022 0.4  0.1 - 1.0 mg/dL Final    Comment: For infants and newborns, interpretation of results should be based  on gestational age, weight and in agreement with clinical  observations.    Premature Infant recommended reference ranges:  Up to 24 hours.............<8.0 mg/dL  Up to 48 hours............<12.0 mg/dL  3-5 days..................<15.0 mg/dL  6-29 days.................<15.0 mg/dL      Alkaline Phosphatase 04/08/2022 79  38 - 126 U/L Final    AST 04/08/2022 34  15 - 46 U/L Final    ALT 04/08/2022 44  10 - 44 U/L Final    Anion Gap 04/08/2022 8  8 - 16 mmol/L Final    eGFR if African American 04/08/2022 >60.0  >60 mL/min/1.73 m^2 Final    eGFR if non African American 04/08/2022 >60.0  >60 mL/min/1.73 m^2 Final    Comment: Calculation used to obtain the estimated glomerular filtration  rate (eGFR) is the CKD-EPI equation.       Cholesterol 04/08/2022 209 (A) 120 - 199 mg/dL  Final    Comment: The National Cholesterol Education Program (NCEP) has set the  following guidelines (reference ranges) for Cholesterol:  Optimal.....................<200 mg/dL  Borderline High.............200-239 mg/dL  High........................> or = 240 mg/dL      Triglycerides 04/08/2022 247 (A) 30 - 150 mg/dL Final    Comment: The National Cholesterol Education Program (NCEP) has set the  following guidelines (reference values) for triglycerides:  Normal......................<150 mg/dL  Borderline High.............150-199 mg/dL  High........................200-499 mg/dL      HDL 04/08/2022 40  40 - 75 mg/dL Final    Comment: The National Cholesterol Education Program (NCEP) has set the  following guidelines (reference values) for HDL Cholesterol:  Low...............<40 mg/dL  Optimal...........>60 mg/dL      LDL Cholesterol 04/08/2022 119.6  63.0 - 159.0 mg/dL Final    Comment: The National Cholesterol Education Program (NCEP) has set the  following guidelines (reference values) for LDL Cholesterol:  Optimal.......................<130 mg/dL  Borderline High...............130-159 mg/dL  High..........................160-189 mg/dL  Very High.....................>190 mg/dL      HDL/Cholesterol Ratio 04/08/2022 19.1 (A) 20.0 - 50.0 % Final    Total Cholesterol/HDL Ratio 04/08/2022 5.2 (A) 2.0 - 5.0 Final    Non-HDL Cholesterol 04/08/2022 169  mg/dL Final    Comment: Risk category and Non-HDL cholesterol goals:  Coronary heart disease (CHD)or equivalent (10-year risk of CHD >20%):  Non-HDL cholesterol goal     <130 mg/dL  Two or more CHD risk factors and 10-year risk of CHD <= 20%:  Non-HDL cholesterol goal     <160 mg/dL  0 to 1 CHD risk factor:  Non-HDL cholesterol goal     <190 mg/dL      HIV 1/2 Ag/Ab 04/08/2022 Negative  Negative Final     NM Hepatobiliary (HIDA) W Pharm and Ejec  Narrative: EXAMINATION:  NM HEPATOBILIARY(HIDA) WITH PHARM AND EF    CLINICAL HISTORY:  RUQ abdominal pain, US  nondiagnostic;  Right upper quadrant pain    TECHNIQUE:  5 mCi of technetium labeled Choletec was administered for today's examination.    FINDINGS:  There is normal radiotracer activity identified within the gallbladder at 12 minutes and small bowel at 44 minutes.  There is normal washout of radiotracer material from the hepatic parenchyma.  The gallbladder ejection fraction at 30 minutes is 40%.  Impression: No significant abnormality.    Electronically signed by: Edu Hay MD  Date:    04/11/2022  Time:    10:11    3/23/2022 STAT     Narrative & Impression  EXAMINATION:  CT ABDOMEN PELVIS WITH CONTRAST     CLINICAL HISTORY:  Abdominal pain, acute, nonlocalized;Right middle abdominal pain;     TECHNIQUE:  Low dose axial images, sagittal and coronal reformations were obtained from the lung bases to the pubic symphysis following the IV administration of 100 mL of Omnipaque 350 .  Oral contrast was not given.     COMPARISON:  Prior exams dating back to 2018     FINDINGS:  Visualized lower chest is unremarkable.     Diffuse hepatic steatosis, similar to prior exams.  The liver is otherwise unremarkable.  The gallbladder, spleen, pancreas, and adrenals are unremarkable.     No bowel wall thickening or dilation.  Scattered colonic diverticulosis.  No ascites.  Normal appendix.  Unremarkable peritoneum.     No abdominal or pelvic adenopathy.     The kidneys and ureters are unremarkable.  Urinary bladder and prostate are unremarkable.     Body wall soft tissues are unremarkable.     No acute bone abnormality.     Impression:     No acute abnormality.        Electronically signed by: Chencho Womack MD  Date:                                            03/23/2022  Time:                                           13:05  3/23/2022 STAT     Narrative & Impression  EXAMINATION:  US ABDOMEN LIMITED     CLINICAL HISTORY:  Abdominal Pain - Gallbladder;     TECHNIQUE:  Limited ultrasound of the right upper quadrant of the abdomen  (including pancreas, liver, gallbladder, common bile duct, and spleen) was performed.     COMPARISON:  Prior exams dating back to 2018     FINDINGS:  The visualized pancreas is unremarkable.     The gallbladder is unremarkable.  No stones or sludge.  Sonographic Sinha sign is absent.     No intrahepatic or extrahepatic biliary dilation.  The common duct measures 0.5 cm in diameter.     No right hydronephrosis.     Technically difficult exam, per sonographer.     Impression:     No acute abnormality.  No sonographic evidence of acute cholecystitis.        Electronically signed by: Chencho Womack MD  Date:                                            03/23/2022  Time:                                           10:59    Results for orders placed or performed during the hospital encounter of 03/23/22   EKG 12-lead    Collection Time: 03/23/22 10:03 AM    Narrative    Test Reason : R42,    Vent. Rate : 062 BPM     Atrial Rate : 062 BPM     P-R Int : 154 ms          QRS Dur : 094 ms      QT Int : 404 ms       P-R-T Axes : 046 047 025 degrees     QTc Int : 410 ms    Normal sinus rhythm  Normal ECG  No previous ECGs available  Confirmed by Nasim MCDONALD MD, Tobias JORGE ([id number]) on 3/23/2022 11:02:10 AM    Referred By: OBED ALVAREZ           Confirmed By:Tobias Rouse III, MD         ASSESSMENT/PLAN:  Problem List Items Addressed This Visit        Psychiatric    Anxiety    Overview     - well controlled  - continue current medication              ENT    Mixed conductive and sensorineural hearing loss of both ears    Overview     - right ear since birth with hearing loss left side  - 4/1/22 evaluated by ENT  - pending evaluation by neurotology for BAHA               Endocrine    Class 2 obesity due to excess calories without serious comorbidity with body mass index (BMI) of 36.0 to 36.9 in adult    Overview     - discussed recommendation for diet and cardiovascular exercise  - counseling on lifestyle modifications for risk  factor reduction              GI    Elevated LFTs    Overview     Lab Results   Component Value Date    ALT 44 04/08/2022    AST 34 04/08/2022    ALKPHOS 79 04/08/2022    BILITOT 0.4 04/08/2022     - 7/10/2018 hepatitis C negative  - 3/23/22 CT abd/pelvis: fatty liver disease  normal  - likely related with diet and lifestyle  - pt denies alcohol use  - discussed recommendation for diet and cardiovascular exercise  - counseling on lifestyle modifications for risk factor reduction  - improved           Relevant Orders    CBC Without Differential    Comprehensive Metabolic Panel    Fatty liver    Overview     - 03/23/2022 CT abdomen pelvis:  Diffuse hepatic steatosis similar to prior exams.  - 7/10/2018 hepatitis C negative  Lab Results   Component Value Date    ALT 44 04/08/2022    AST 34 04/08/2022    ALKPHOS 79 04/08/2022    BILITOT 0.4 04/08/2022     - discussed recommendation for diet and cardiovascular exercise  - counseling on lifestyle modifications for risk factor reduction           Relevant Orders    CBC Without Differential    Comprehensive Metabolic Panel    Functional dyspepsia    Overview     - 08/07/2020 EGD:  Erythematous gastric antrum.  Small hiatal hernia.  Pathology negative for H pylori or dysplasia  - followed by gastroenterology           Gastroesophageal reflux disease without esophagitis    Overview     - followed by gastroenterology  - 08/07/2020 EGD:  Small hiatal hernia.  Negative H pylori.  No dysplasia.  No esophagitis           RUQ pain - Primary    Overview     - seen in ER 3/23/22 and following with GI.  Notes reviewed  - 2022 CT abd/pelvis, US and HIDA scan benign  - h/o PUD  - diagnosed with functional dyspepsia  - GI started bentyl and pantoprazole and following patient             Other Visit Diagnoses     Encounter for lipid screening for cardiovascular disease        Relevant Orders    Lipid Panel          ORDERS:   Orders Placed This Encounter    CBC Without Differential     Comprehensive Metabolic Panel    Lipid Panel       Vaccines recommended:  COVID-19 and influenza vaccine    Follow-up in 1 year with labs  or sooner if any concerns.      This note is dictated using the M*Modal Fluency Direct word recognition program. There are word recognition mistakes that are occasionally missed on review.    Dr. Dotty Virk D.O.   [facility]

## 2022-04-13 ENCOUNTER — OFFICE VISIT (OUTPATIENT)
Dept: FAMILY MEDICINE | Facility: CLINIC | Age: 32
End: 2022-04-13
Payer: COMMERCIAL

## 2022-04-13 VITALS
DIASTOLIC BLOOD PRESSURE: 70 MMHG | OXYGEN SATURATION: 98 % | HEART RATE: 65 BPM | WEIGHT: 274.31 LBS | HEIGHT: 73 IN | SYSTOLIC BLOOD PRESSURE: 126 MMHG | BODY MASS INDEX: 36.35 KG/M2

## 2022-04-13 DIAGNOSIS — K76.0 FATTY LIVER: ICD-10-CM

## 2022-04-13 DIAGNOSIS — R10.11 RUQ PAIN: Primary | ICD-10-CM

## 2022-04-13 DIAGNOSIS — E66.09 CLASS 2 OBESITY DUE TO EXCESS CALORIES WITHOUT SERIOUS COMORBIDITY WITH BODY MASS INDEX (BMI) OF 36.0 TO 36.9 IN ADULT: ICD-10-CM

## 2022-04-13 DIAGNOSIS — K21.9 GASTROESOPHAGEAL REFLUX DISEASE WITHOUT ESOPHAGITIS: ICD-10-CM

## 2022-04-13 DIAGNOSIS — Z13.6 ENCOUNTER FOR LIPID SCREENING FOR CARDIOVASCULAR DISEASE: ICD-10-CM

## 2022-04-13 DIAGNOSIS — R79.89 ELEVATED LFTS: ICD-10-CM

## 2022-04-13 DIAGNOSIS — Z13.220 ENCOUNTER FOR LIPID SCREENING FOR CARDIOVASCULAR DISEASE: ICD-10-CM

## 2022-04-13 DIAGNOSIS — K30 FUNCTIONAL DYSPEPSIA: ICD-10-CM

## 2022-04-13 DIAGNOSIS — H90.6 MIXED CONDUCTIVE AND SENSORINEURAL HEARING LOSS OF BOTH EARS: ICD-10-CM

## 2022-04-13 DIAGNOSIS — F41.9 ANXIETY: ICD-10-CM

## 2022-04-13 PROCEDURE — 3074F SYST BP LT 130 MM HG: CPT | Mod: CPTII,S$GLB,, | Performed by: FAMILY MEDICINE

## 2022-04-13 PROCEDURE — 1160F PR REVIEW ALL MEDS BY PRESCRIBER/CLIN PHARMACIST DOCUMENTED: ICD-10-PCS | Mod: CPTII,S$GLB,, | Performed by: FAMILY MEDICINE

## 2022-04-13 PROCEDURE — 3074F PR MOST RECENT SYSTOLIC BLOOD PRESSURE < 130 MM HG: ICD-10-PCS | Mod: CPTII,S$GLB,, | Performed by: FAMILY MEDICINE

## 2022-04-13 PROCEDURE — 3008F BODY MASS INDEX DOCD: CPT | Mod: CPTII,S$GLB,, | Performed by: FAMILY MEDICINE

## 2022-04-13 PROCEDURE — 99999 PR PBB SHADOW E&M-EST. PATIENT-LVL IV: CPT | Mod: PBBFAC,,, | Performed by: FAMILY MEDICINE

## 2022-04-13 PROCEDURE — 3078F DIAST BP <80 MM HG: CPT | Mod: CPTII,S$GLB,, | Performed by: FAMILY MEDICINE

## 2022-04-13 PROCEDURE — 1159F PR MEDICATION LIST DOCUMENTED IN MEDICAL RECORD: ICD-10-PCS | Mod: CPTII,S$GLB,, | Performed by: FAMILY MEDICINE

## 2022-04-13 PROCEDURE — 99214 PR OFFICE/OUTPT VISIT, EST, LEVL IV, 30-39 MIN: ICD-10-PCS | Mod: S$GLB,,, | Performed by: FAMILY MEDICINE

## 2022-04-13 PROCEDURE — 1160F RVW MEDS BY RX/DR IN RCRD: CPT | Mod: CPTII,S$GLB,, | Performed by: FAMILY MEDICINE

## 2022-04-13 PROCEDURE — 3078F PR MOST RECENT DIASTOLIC BLOOD PRESSURE < 80 MM HG: ICD-10-PCS | Mod: CPTII,S$GLB,, | Performed by: FAMILY MEDICINE

## 2022-04-13 PROCEDURE — 99999 PR PBB SHADOW E&M-EST. PATIENT-LVL IV: ICD-10-PCS | Mod: PBBFAC,,, | Performed by: FAMILY MEDICINE

## 2022-04-13 PROCEDURE — 99214 OFFICE O/P EST MOD 30 MIN: CPT | Mod: S$GLB,,, | Performed by: FAMILY MEDICINE

## 2022-04-13 PROCEDURE — 1159F MED LIST DOCD IN RCRD: CPT | Mod: CPTII,S$GLB,, | Performed by: FAMILY MEDICINE

## 2022-04-13 PROCEDURE — 3008F PR BODY MASS INDEX (BMI) DOCUMENTED: ICD-10-PCS | Mod: CPTII,S$GLB,, | Performed by: FAMILY MEDICINE

## 2022-04-22 ENCOUNTER — OFFICE VISIT (OUTPATIENT)
Dept: GASTROENTEROLOGY | Facility: CLINIC | Age: 32
End: 2022-04-22
Payer: COMMERCIAL

## 2022-04-22 VITALS
HEIGHT: 73 IN | BODY MASS INDEX: 35.97 KG/M2 | HEART RATE: 66 BPM | DIASTOLIC BLOOD PRESSURE: 77 MMHG | WEIGHT: 271.38 LBS | SYSTOLIC BLOOD PRESSURE: 124 MMHG

## 2022-04-22 DIAGNOSIS — K21.9 GASTROESOPHAGEAL REFLUX DISEASE WITHOUT ESOPHAGITIS: Primary | ICD-10-CM

## 2022-04-22 DIAGNOSIS — K30 FUNCTIONAL DYSPEPSIA: ICD-10-CM

## 2022-04-22 PROCEDURE — 3008F BODY MASS INDEX DOCD: CPT | Mod: CPTII,S$GLB,, | Performed by: NURSE PRACTITIONER

## 2022-04-22 PROCEDURE — 99214 PR OFFICE/OUTPT VISIT, EST, LEVL IV, 30-39 MIN: ICD-10-PCS | Mod: S$GLB,,, | Performed by: NURSE PRACTITIONER

## 2022-04-22 PROCEDURE — 3074F SYST BP LT 130 MM HG: CPT | Mod: CPTII,S$GLB,, | Performed by: NURSE PRACTITIONER

## 2022-04-22 PROCEDURE — 1159F MED LIST DOCD IN RCRD: CPT | Mod: CPTII,S$GLB,, | Performed by: NURSE PRACTITIONER

## 2022-04-22 PROCEDURE — 1160F RVW MEDS BY RX/DR IN RCRD: CPT | Mod: CPTII,S$GLB,, | Performed by: NURSE PRACTITIONER

## 2022-04-22 PROCEDURE — 1159F PR MEDICATION LIST DOCUMENTED IN MEDICAL RECORD: ICD-10-PCS | Mod: CPTII,S$GLB,, | Performed by: NURSE PRACTITIONER

## 2022-04-22 PROCEDURE — 1160F PR REVIEW ALL MEDS BY PRESCRIBER/CLIN PHARMACIST DOCUMENTED: ICD-10-PCS | Mod: CPTII,S$GLB,, | Performed by: NURSE PRACTITIONER

## 2022-04-22 PROCEDURE — 99214 OFFICE O/P EST MOD 30 MIN: CPT | Mod: S$GLB,,, | Performed by: NURSE PRACTITIONER

## 2022-04-22 PROCEDURE — 99999 PR PBB SHADOW E&M-EST. PATIENT-LVL III: ICD-10-PCS | Mod: PBBFAC,,, | Performed by: NURSE PRACTITIONER

## 2022-04-22 PROCEDURE — 3078F PR MOST RECENT DIASTOLIC BLOOD PRESSURE < 80 MM HG: ICD-10-PCS | Mod: CPTII,S$GLB,, | Performed by: NURSE PRACTITIONER

## 2022-04-22 PROCEDURE — 3074F PR MOST RECENT SYSTOLIC BLOOD PRESSURE < 130 MM HG: ICD-10-PCS | Mod: CPTII,S$GLB,, | Performed by: NURSE PRACTITIONER

## 2022-04-22 PROCEDURE — 99999 PR PBB SHADOW E&M-EST. PATIENT-LVL III: CPT | Mod: PBBFAC,,, | Performed by: NURSE PRACTITIONER

## 2022-04-22 PROCEDURE — 3078F DIAST BP <80 MM HG: CPT | Mod: CPTII,S$GLB,, | Performed by: NURSE PRACTITIONER

## 2022-04-22 PROCEDURE — 3008F PR BODY MASS INDEX (BMI) DOCUMENTED: ICD-10-PCS | Mod: CPTII,S$GLB,, | Performed by: NURSE PRACTITIONER

## 2022-04-22 NOTE — PROGRESS NOTES
Subjective:       Patient ID: Marshal Baer is a 31 y.o. male.    Chief Complaint: Follow-up    32 y/o male presents to clinic for follow up and HIDA scan results. HIDA scan normal; no gallbladder dysfunction. Patient states he is feeling well. No abdominal pain with in over 2 weeks. GERD symptoms controlled with daily pantoprazole. BM daily without hematochezia or melena.    - 8/7/2020 EGD showed normal esophageal mucosa; small hiatal hernia with significant reflux noted on this exam; Erythematous mucosa in the antrum; normal examined duodenum.        Past Medical History:   Diagnosis Date    Anxiety     Deafness in right ear     since birth    Varicocele 8/17/2016       Past Surgical History:   Procedure Laterality Date    COLONOSCOPY N/A 1/12/2021    Procedure: COLONOSCOPY;  Surgeon: Valentina Patton MD;  [facility];  Service: Endoscopy;  Laterality: N/A;    ENDOSCOPY  11/2019    ESOPHAGOGASTRODUODENOSCOPY N/A 8/7/2020    Procedure: EGD (ESOPHAGOGASTRODUODENOSCOPY);  Surgeon: Valentina Patton MD;  [facility];  Service: Endoscopy;  Laterality: N/A;    VASECTOMY  12/27/2019       Family History   Problem Relation Age of Onset    Hypertension Mother     Hypertension Father     Diabetes Father     Heart disease Paternal Uncle 42        Heart Attack in 1999    Cancer Maternal Grandmother         Breast  cancer 2006    No Known Problems Maternal Grandfather     Prostate cancer Neg Hx     Kidney disease Neg Hx        Social History     Socioeconomic History    Marital status:     Number of children: 2   Occupational History     Employer: [employer]   Tobacco Use    Smoking status: Never Smoker    Smokeless tobacco: Never Used   Substance and Sexual Activity    Alcohol use: Yes     Comment: rarely    Drug use: No    Sexual activity: Yes     Partners: Female   Social History Narrative    Lives in [place] with his wife and son born in 2017.  "He was  in December 22, 2014. He was employed by the [employer]. 2 children (son and daughter born 2019).      Social Determinants of Health     Financial Resource Strain: Low Risk     Difficulty of Paying Living Expenses: Not hard at all   Food Insecurity: No Food Insecurity    Worried About Running Out of Food in the Last Year: Never true    Ran Out of Food in the Last Year: Never true   Transportation Needs: No Transportation Needs    Lack of Transportation (Medical): No    Lack of Transportation (Non-Medical): No   Physical Activity: Sufficiently Active    Days of Exercise per Week: 5 days    Minutes of Exercise per Session: 100 min   Stress: No Stress Concern Present    Feeling of Stress : Not at all   Social Connections: Unknown    Frequency of Communication with Friends and Family: More than three times a week    Frequency of Social Gatherings with Friends and Family: Twice a week    Active Member of Clubs or Organizations: No    Attends Club or Organization Meetings: Never    Marital Status:    Housing Stability: High Risk    Unable to Pay for Housing in the Last Year: No    Number of Places Lived in the Last Year: 3    Unstable Housing in the Last Year: No       Review of Systems   Constitutional: Negative for appetite change and unexpected weight change.   HENT: Negative for trouble swallowing.    Respiratory: Negative for shortness of breath.    Cardiovascular: Negative for chest pain.   Gastrointestinal: Negative for abdominal pain, constipation, diarrhea, nausea and vomiting.   Genitourinary: Negative for dysuria.   Neurological: Negative for dizziness and weakness.   Hematological: Negative for adenopathy. Does not bruise/bleed easily.   Psychiatric/Behavioral: Negative for dysphoric mood.         Objective:     Vitals:    04/22/22 1504   BP: 124/77   Pulse: 66   Weight: 123.1 kg (271 lb 6.4 oz)   Height: 6' 1" (1.854 m)          Physical " Exam  Constitutional:       General: He is not in acute distress.     Appearance: Normal appearance.   HENT:      Head: Normocephalic.   Eyes:      Conjunctiva/sclera: Conjunctivae normal.      Pupils: Pupils are equal, round, and reactive to light.   Pulmonary:      Effort: Pulmonary effort is normal. No respiratory distress.   Abdominal:      Tenderness: Negative signs include Sinha's sign.   Musculoskeletal:         General: Normal range of motion.      Cervical back: Normal range of motion.   Skin:     General: Skin is warm and dry.      Coloration: Skin is not jaundiced.   Neurological:      Mental Status: He is alert and oriented to person, place, and time.   Psychiatric:         Mood and Affect: Mood normal.         Behavior: Behavior normal.               Assessment:         ICD-10-CM ICD-9-CM   1. Gastroesophageal reflux disease without esophagitis  K21.9 530.81   2. Functional dyspepsia  K30 536.8       Plan:       Gastroesophageal reflux disease without esophagitis    Functional dyspepsia    - continue current medications. Advised to eat five or six small, nutritious meals a day instead of two or three large meals, chew your food thoroughly, avoid carbonated, or fizzy, beverages, and avoid alcohol.        Follow up if symptoms worsen or fail to improve.     Patient's Medications   New Prescriptions    No medications on file   Previous Medications    AZELASTINE (ASTELIN) 137 MCG (0.1 %) NASAL SPRAY    azelastine 137 mcg (0.1 %) nasal spray aerosol   Spray 1 spray twice a day by intranasal route as needed.    DICYCLOMINE (BENTYL) 20 MG TABLET    Take 1 tablet (20 mg total) by mouth 3 (three) times daily as needed (abdominal pain).    FLUOXETINE 20 MG CAPSULE    Take 1 capsule (20 mg total) by mouth once daily.    LEVOCETIRIZINE (XYZAL) 5 MG TABLET    Take 1 tablet (5 mg total) by mouth every evening.    MONTELUKAST (SINGULAIR) 10 MG TABLET    Take 1 tablet (10 mg total) by mouth every evening.     PANTOPRAZOLE (PROTONIX) 40 MG TABLET    Take 1 tablet (40 mg total) by mouth once daily.    TAMSULOSIN (FLOMAX) 0.4 MG CAP    Take 1 capsule (0.4 mg total) by mouth once daily.   Modified Medications    No medications on file   Discontinued Medications    No medications on file

## 2022-05-10 ENCOUNTER — CLINICAL SUPPORT (OUTPATIENT)
Dept: AUDIOLOGY | Facility: CLINIC | Age: 32
End: 2022-05-10
Payer: COMMERCIAL

## 2022-05-10 ENCOUNTER — OFFICE VISIT (OUTPATIENT)
Dept: OTOLARYNGOLOGY | Facility: CLINIC | Age: 32
End: 2022-05-10
Payer: COMMERCIAL

## 2022-05-10 ENCOUNTER — PATIENT MESSAGE (OUTPATIENT)
Dept: AUDIOLOGY | Facility: CLINIC | Age: 32
End: 2022-05-10

## 2022-05-10 VITALS — BODY MASS INDEX: 36.07 KG/M2 | WEIGHT: 273.38 LBS

## 2022-05-10 DIAGNOSIS — H90.6 MIXED CONDUCTIVE AND SENSORINEURAL HEARING LOSS OF BOTH EARS: Primary | ICD-10-CM

## 2022-05-10 DIAGNOSIS — H91.92 DEAFNESS IN LEFT EAR: ICD-10-CM

## 2022-05-10 DIAGNOSIS — H90.A22 SENSORINEURAL HEARING LOSS (SNHL) OF LEFT EAR WITH RESTRICTED HEARING OF RIGHT EAR: Primary | ICD-10-CM

## 2022-05-10 PROCEDURE — 1159F MED LIST DOCD IN RCRD: CPT | Mod: CPTII,S$GLB,, | Performed by: OTOLARYNGOLOGY

## 2022-05-10 PROCEDURE — 3008F BODY MASS INDEX DOCD: CPT | Mod: CPTII,S$GLB,, | Performed by: OTOLARYNGOLOGY

## 2022-05-10 PROCEDURE — 99999 PR PBB SHADOW E&M-EST. PATIENT-LVL I: CPT | Mod: PBBFAC,,, | Performed by: AUDIOLOGIST

## 2022-05-10 PROCEDURE — 92626 EVAL AUD FUNCJ 1ST HOUR: CPT | Mod: PBBFAC | Performed by: AUDIOLOGIST

## 2022-05-10 PROCEDURE — 1159F PR MEDICATION LIST DOCUMENTED IN MEDICAL RECORD: ICD-10-PCS | Mod: CPTII,S$GLB,, | Performed by: OTOLARYNGOLOGY

## 2022-05-10 PROCEDURE — 99999 PR PBB SHADOW E&M-EST. PATIENT-LVL III: CPT | Mod: PBBFAC,,, | Performed by: OTOLARYNGOLOGY

## 2022-05-10 PROCEDURE — 99999 PR PBB SHADOW E&M-EST. PATIENT-LVL III: ICD-10-PCS | Mod: PBBFAC,,, | Performed by: OTOLARYNGOLOGY

## 2022-05-10 PROCEDURE — 99999 PR PBB SHADOW E&M-EST. PATIENT-LVL I: ICD-10-PCS | Mod: PBBFAC,,, | Performed by: AUDIOLOGIST

## 2022-05-10 PROCEDURE — 3008F PR BODY MASS INDEX (BMI) DOCUMENTED: ICD-10-PCS | Mod: CPTII,S$GLB,, | Performed by: OTOLARYNGOLOGY

## 2022-05-10 PROCEDURE — 99214 PR OFFICE/OUTPT VISIT, EST, LEVL IV, 30-39 MIN: ICD-10-PCS | Mod: S$GLB,,, | Performed by: OTOLARYNGOLOGY

## 2022-05-10 PROCEDURE — 92626 EVAL AUD FUNCJ 1ST HOUR: CPT | Mod: S$GLB,,, | Performed by: AUDIOLOGIST

## 2022-05-10 PROCEDURE — 99214 OFFICE O/P EST MOD 30 MIN: CPT | Mod: S$GLB,,, | Performed by: OTOLARYNGOLOGY

## 2022-05-10 PROCEDURE — 99211 OFF/OP EST MAY X REQ PHY/QHP: CPT | Mod: PBBFAC | Performed by: AUDIOLOGIST

## 2022-05-10 PROCEDURE — 92626 PR EVAL, AUDITORY FUNCTION, SURG IMPLANT DEVICE, 1ST HR: ICD-10-PCS | Mod: S$GLB,,, | Performed by: AUDIOLOGIST

## 2022-05-10 NOTE — PROGRESS NOTES
Marshal Baer, a 32 y.o. male, was seen today to discuss [facility] system.   Mr. Baer has a history of right side profound hearing loss since childhood. He currently has a normal to mild hearing loss at 8000 Hz only in his left ear and a profound sensorineural hearing loss (SNHL) hearing loss.  Mr. Baer is interested in the [facility] system for his right ear. The [facility] system and the BAHA 6 max connect were described for patient in detail.  Pros and cons were discussed for each option.  Pt is aware of MRI limitation with the [facility] system.  A Baha 5 power processor was programmed for him to try.  Patient was pleased with how the processor sounded. He was tested with the Baha 5 power processor. Based on the results of today's testing, patient would be an excellent candidate for the Cochlear [facility]ia system.  Pt was happy with the demo and decided to proceed with an [facility]ia.  Pt will be contacted to schedule surgery.

## 2022-05-11 ENCOUNTER — TELEPHONE (OUTPATIENT)
Dept: OTOLARYNGOLOGY | Facility: CLINIC | Age: 32
End: 2022-05-11
Payer: COMMERCIAL

## 2022-05-11 ENCOUNTER — PATIENT MESSAGE (OUTPATIENT)
Dept: SURGERY | Facility: HOSPITAL | Age: 32
End: 2022-05-11
Payer: COMMERCIAL

## 2022-05-11 DIAGNOSIS — H90.A31 MIXED CONDUCTIVE AND SENSORINEURAL HEARING LOSS OF RIGHT EAR WITH RESTRICTED HEARING OF LEFT EAR: Primary | ICD-10-CM

## 2022-05-11 DIAGNOSIS — H90.A22 SENSORINEURAL HEARING LOSS (SNHL) OF LEFT EAR WITH RESTRICTED HEARING OF RIGHT EAR: ICD-10-CM

## 2022-05-11 DIAGNOSIS — Z01.818 PRE-OP TESTING: ICD-10-CM

## 2022-05-11 NOTE — PROGRESS NOTES
Answers for HPI/ROS submitted by the patient on 5/7/2022  None of these: Yes  None of these : Yes  Snoring?: Yes  None of these : Yes  Acid Reflux?: Yes  None of these: Yes  None of these: Yes  None of these : Yes  Seasonal Allergies?: Yes  None of these : Yes  None of these: Yes  None of these: Yes  None of these: Yes

## 2022-05-13 ENCOUNTER — PATIENT MESSAGE (OUTPATIENT)
Dept: GASTROENTEROLOGY | Facility: CLINIC | Age: 32
End: 2022-05-13
Payer: COMMERCIAL

## 2022-05-31 ENCOUNTER — PATIENT MESSAGE (OUTPATIENT)
Dept: AUDIOLOGY | Facility: CLINIC | Age: 32
End: 2022-05-31
Payer: COMMERCIAL

## 2022-05-31 ENCOUNTER — TELEPHONE (OUTPATIENT)
Dept: AUDIOLOGY | Facility: CLINIC | Age: 32
End: 2022-05-31
Payer: COMMERCIAL

## 2022-05-31 NOTE — TELEPHONE ENCOUNTER
Spoke to  Buffy.  He requested contact information for the billing department to discuss authorization for his upcoming surgery.  He was provided with that contact information. Pt will contact the office if other needs arise.

## 2022-06-12 ENCOUNTER — PATIENT MESSAGE (OUTPATIENT)
Dept: OTOLARYNGOLOGY | Facility: CLINIC | Age: 32
End: 2022-06-12
Payer: COMMERCIAL

## 2022-06-12 ENCOUNTER — PATIENT MESSAGE (OUTPATIENT)
Dept: FAMILY MEDICINE | Facility: CLINIC | Age: 32
End: 2022-06-12
Payer: COMMERCIAL

## 2022-06-13 ENCOUNTER — TELEPHONE (OUTPATIENT)
Dept: OTOLARYNGOLOGY | Facility: CLINIC | Age: 32
End: 2022-06-13
Payer: COMMERCIAL

## 2022-06-13 NOTE — TELEPHONE ENCOUNTER
Ok to use pink eye medication and it was sent to the pharmacy Tried to call pt to pick a date for surgery. Left message for pt to call back when ready to schedule.

## 2022-06-16 ENCOUNTER — PATIENT MESSAGE (OUTPATIENT)
Dept: OTOLARYNGOLOGY | Facility: CLINIC | Age: 32
End: 2022-06-16
Payer: COMMERCIAL

## 2022-06-19 ENCOUNTER — PATIENT MESSAGE (OUTPATIENT)
Dept: FAMILY MEDICINE | Facility: CLINIC | Age: 32
End: 2022-06-19
Payer: COMMERCIAL

## 2022-06-22 ENCOUNTER — OFFICE VISIT (OUTPATIENT)
Dept: FAMILY MEDICINE | Facility: CLINIC | Age: 32
End: 2022-06-22
Payer: COMMERCIAL

## 2022-06-22 VITALS
DIASTOLIC BLOOD PRESSURE: 70 MMHG | HEART RATE: 66 BPM | SYSTOLIC BLOOD PRESSURE: 128 MMHG | HEIGHT: 73 IN | OXYGEN SATURATION: 99 % | WEIGHT: 271.88 LBS | BODY MASS INDEX: 36.03 KG/M2

## 2022-06-22 DIAGNOSIS — L30.9 DERMATITIS: Primary | ICD-10-CM

## 2022-06-22 DIAGNOSIS — E66.09 CLASS 2 OBESITY DUE TO EXCESS CALORIES WITHOUT SERIOUS COMORBIDITY WITH BODY MASS INDEX (BMI) OF 35.0 TO 35.9 IN ADULT: ICD-10-CM

## 2022-06-22 DIAGNOSIS — M79.672 LEFT FOOT PAIN: ICD-10-CM

## 2022-06-22 PROBLEM — R10.11 RUQ PAIN: Status: RESOLVED | Noted: 2020-08-07 | Resolved: 2022-06-22

## 2022-06-22 PROCEDURE — 1159F PR MEDICATION LIST DOCUMENTED IN MEDICAL RECORD: ICD-10-PCS | Mod: CPTII,S$GLB,, | Performed by: FAMILY MEDICINE

## 2022-06-22 PROCEDURE — 99999 PR PBB SHADOW E&M-EST. PATIENT-LVL III: ICD-10-PCS | Mod: PBBFAC,,, | Performed by: FAMILY MEDICINE

## 2022-06-22 PROCEDURE — 1159F MED LIST DOCD IN RCRD: CPT | Mod: CPTII,S$GLB,, | Performed by: FAMILY MEDICINE

## 2022-06-22 PROCEDURE — 99999 PR PBB SHADOW E&M-EST. PATIENT-LVL III: CPT | Mod: PBBFAC,,, | Performed by: FAMILY MEDICINE

## 2022-06-22 PROCEDURE — 99214 OFFICE O/P EST MOD 30 MIN: CPT | Mod: S$GLB,,, | Performed by: FAMILY MEDICINE

## 2022-06-22 PROCEDURE — 3008F PR BODY MASS INDEX (BMI) DOCUMENTED: ICD-10-PCS | Mod: CPTII,S$GLB,, | Performed by: FAMILY MEDICINE

## 2022-06-22 PROCEDURE — 3074F SYST BP LT 130 MM HG: CPT | Mod: CPTII,S$GLB,, | Performed by: FAMILY MEDICINE

## 2022-06-22 PROCEDURE — 3078F PR MOST RECENT DIASTOLIC BLOOD PRESSURE < 80 MM HG: ICD-10-PCS | Mod: CPTII,S$GLB,, | Performed by: FAMILY MEDICINE

## 2022-06-22 PROCEDURE — 3008F BODY MASS INDEX DOCD: CPT | Mod: CPTII,S$GLB,, | Performed by: FAMILY MEDICINE

## 2022-06-22 PROCEDURE — 3078F DIAST BP <80 MM HG: CPT | Mod: CPTII,S$GLB,, | Performed by: FAMILY MEDICINE

## 2022-06-22 PROCEDURE — 3074F PR MOST RECENT SYSTOLIC BLOOD PRESSURE < 130 MM HG: ICD-10-PCS | Mod: CPTII,S$GLB,, | Performed by: FAMILY MEDICINE

## 2022-06-22 PROCEDURE — 99214 PR OFFICE/OUTPT VISIT, EST, LEVL IV, 30-39 MIN: ICD-10-PCS | Mod: S$GLB,,, | Performed by: FAMILY MEDICINE

## 2022-06-22 RX ORDER — MELOXICAM 15 MG/1
15 TABLET ORAL DAILY
Qty: 30 TABLET | Refills: 0 | Status: SHIPPED | OUTPATIENT
Start: 2022-06-22 | End: 2022-08-01

## 2022-06-22 RX ORDER — TRIAMCINOLONE ACETONIDE 5 MG/G
CREAM TOPICAL 2 TIMES DAILY
Qty: 45 G | Refills: 0 | Status: SHIPPED | OUTPATIENT
Start: 2022-06-22 | End: 2022-08-01

## 2022-06-22 NOTE — PROGRESS NOTES
FAMILY MEDICINE  [facility]    Reason for visit:   Chief Complaint   Patient presents with    Rash     Rash on body and foot.  Itches.  Hurts on foot when walking.        HPI: Marshal Baer is a 32 y.o. male  - with obesity, GERD, chronic rhinitis, anxiety, depression, fatty liver, hearing loss (since childhood right greater than left) and history of covid-19 (7/2021) presents for concerns for rash and left foot pain    Gastroenterology: Dr. Patton and AYALA Colon  Urology:  Dr. Swann  ENT Dr. Krishnamurthy   - 4/1/22 note and diagnosed with mixed conductive and neurosensory hearing loss and plans for implant    He also reports a whistling noise that he notices when he lies flat only.  He also notices occasionally when he leans backwards.  He initially thought it was a wheeze but it has not improved since starting montelukast however he does found the montelukast has improved his chronic rhinitis.  He denies any difficulty breathing, shortness of breath or dyspnea on exertion.    1. Rash  - see below    He report at patch started on his right ankle that has resolved with over-the-counter hydrocortisone cream.  He reports that it was red and large however it seems to have nearly resolved.  He also notes a diffuse rash that is slightly different with little red papules that worsens the more he itches it    2. Left foot pain    Onset: 1 week  Location:  Ball of his left foot  Duration:  Only with weight-bearing without shoes  Character:  shooting pain  Aggravating factors:  Walking barefoot  Relieving factors:  Walking with she use  Timing of day:  Any time of the day  Associated symptoms:  Denies  Negative symptoms:  Denies any inciting injury, swelling, redness, decreased range of motion, weakness        Rash  This is a new problem. The current episode started 1 to 4 weeks ago. The problem has been gradually improving since onset. The affected locations include the abdomen, left  arm, right arm, left upper leg, left lower leg, left ankle, left foot, right upper leg and right lower leg. The rash is characterized by redness, itchiness and burning. 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 Pertinent negatives include no anorexia, congestion, cough, diarrhea, eye pain, facial edema, fatigue, fever, joint pain, nail changes, rhinorrhea, shortness of breath, sore throat or vomiting. Past treatments include anti-itch cream and topical steroids. The treatment provided mild relief. There is no history of asthma or eczema.         Review of Systems   Constitutional: Negative for fatigue and fever.   HENT: Negative for congestion, rhinorrhea and sore throat.    Eyes: Negative for pain.   Respiratory: Negative for cough and shortness of breath.    Gastrointestinal: Negative for anorexia, diarrhea and vomiting.   Musculoskeletal: Negative for joint pain.   Skin: Positive for rash. Negative for nail changes.   All other systems reviewed and are negative.      ALLERGIES:   Review of patient's allergies indicates:   Allergen Reactions    Tetracycline Other (See Comments)       MEDS:     Current Outpatient Medications:     azelastine (ASTELIN) 137 mcg (0.1 %) nasal spray, azelastine 137 mcg (0.1 %) nasal spray aerosol  Spray 1 spray twice a day by intranasal route as needed., Disp: , Rfl:     FLUoxetine 20 MG capsule, Take 1 capsule (20 mg total) by mouth once daily., Disp: 90 capsule, Rfl: 4    montelukast (SINGULAIR) 10 mg tablet, Take 1 tablet (10 mg total) by mouth every evening., Disp: 30 tablet, Rfl: 11    pantoprazole (PROTONIX) 40 MG tablet, Take 1 tablet (40 mg total) by mouth once daily., Disp: 30 tablet, Rfl: 11    dicyclomine (BENTYL) 20 mg tablet, Take 1 tablet (20 mg total) by mouth 3 (three) times daily as needed (abdominal pain). (Patient not taking: Reported on 6/22/2022), Disp: 60 tablet, Rfl: 2    meloxicam  "(MOBIC) 15 MG tablet, Take 1 tablet (15 mg total) by mouth once daily., Disp: 30 tablet, Rfl: 0    tamsulosin (FLOMAX) 0.4 mg Cap, Take 1 capsule (0.4 mg total) by mouth once daily. (Patient not taking: Reported on 6/22/2022), Disp: 30 capsule, Rfl: 11    triamcinolone acetonide 0.5% (KENALOG) 0.5 % Crea, Apply topically 2 (two) times daily., Disp: 45 g, Rfl: 0    Vital signs:   Vitals:    06/22/22 1211   BP: 128/70   Pulse: 66   SpO2: 99%   Weight: 123.3 kg (271 lb 14.4 oz)   Height: 6' 1" (1.854 m)     Body mass index is 35.87 kg/m².    PHYSICAL EXAM:     Physical Exam  Constitutional:       General: He is not in acute distress.  HENT:      Head: Normocephalic and atraumatic.      Right Ear: Tympanic membrane and ear canal normal.      Left Ear: Tympanic membrane and ear canal normal.      Nose: Septal deviation and rhinorrhea present. No nasal tenderness, mucosal edema or congestion. Rhinorrhea is clear.      Right Nostril: No foreign body.      Left Nostril: No foreign body.      Right Turbinates: Enlarged.      Left Turbinates: Enlarged.      Right Sinus: No maxillary sinus tenderness or frontal sinus tenderness.      Left Sinus: No maxillary sinus tenderness or frontal sinus tenderness.      Comments: Weaning back his laying flat I can hear the nasal whistling.  Lung exam normal  Neck:      Thyroid: No thyromegaly.      Vascular: No carotid bruit.   Cardiovascular:      Rate and Rhythm: Normal rate and regular rhythm.      Pulses: Normal pulses.           Dorsalis pedis pulses are 2+ on the right side and 2+ on the left side.        Posterior tibial pulses are 2+ on the right side and 2+ on the left side.      Heart sounds: Normal heart sounds. No murmur heard.    No friction rub. No gallop.   Pulmonary:      Effort: Pulmonary effort is normal.      Breath sounds: Normal breath sounds. No wheezing, rhonchi or rales.   Abdominal:      General: Bowel sounds are normal.      Palpations: Abdomen is soft. "   Musculoskeletal:      Cervical back: Neck supple.      Right lower leg: No edema.      Left lower leg: No edema.      Right foot: Normal range of motion. No deformity, bunion or prominent metatarsal heads.      Left foot: Normal range of motion. No deformity, bunion or prominent metatarsal heads.        Feet:    Feet:      Right foot:      Protective Sensation: 5 sites tested. 5 sites sensed.      Skin integrity: Skin integrity normal.      Left foot:      Protective Sensation: 5 sites tested. 5 sites sensed.      Skin integrity: Blister present. No skin breakdown or erythema.   Lymphadenopathy:      Cervical: No cervical adenopathy.   Skin:     General: Skin is warm.      Findings: Erythema and rash present. No abscess. Rash is purpuric and urticarial. Rash is not crusting, pustular, scaling or vesicular.             Comments: Diffuse erythematous papules; positive dermatographia   Neurological:      Mental Status: He is alert.           PHQ4 = Score: 0    PERTINENT RESULTS:   No visits with results within 1 Week(s) from this visit.   Latest known visit with results is:   Lab Visit on 04/08/2022   Component Date Value Ref Range Status    WBC 04/08/2022 7.45  3.90 - 12.70 K/uL Final    RBC 04/08/2022 5.48  4.60 - 6.20 M/uL Final    Hemoglobin 04/08/2022 15.6  14.0 - 18.0 g/dL Final    Hematocrit 04/08/2022 47.3  40.0 - 54.0 % Final    MCV 04/08/2022 86  82 - 98 fL Final    MCH 04/08/2022 28.5  27.0 - 31.0 pg Final    MCHC 04/08/2022 33.0  32.0 - 36.0 g/dL Final    RDW 04/08/2022 12.3  11.5 - 14.5 % Final    Platelets 04/08/2022 193  150 - 450 K/uL Final    MPV 04/08/2022 10.8  9.2 - 12.9 fL Final    Sodium 04/08/2022 144  136 - 145 mmol/L Final    Potassium 04/08/2022 4.1  3.5 - 5.1 mmol/L Final    Chloride 04/08/2022 108  95 - 110 mmol/L Final    CO2 04/08/2022 28  23 - 29 mmol/L Final    Glucose 04/08/2022 100  70 - 110 mg/dL Final    BUN 04/08/2022 16  2 - 20 mg/dL Final    Creatinine  04/08/2022 0.87  0.50 - 1.40 mg/dL Final    Calcium 04/08/2022 8.6 (A) 8.7 - 10.5 mg/dL Final    Total Protein 04/08/2022 7.5  6.0 - 8.4 g/dL Final    Albumin 04/08/2022 4.5  3.5 - 5.2 g/dL Final    Total Bilirubin 04/08/2022 0.4  0.1 - 1.0 mg/dL Final    Comment: For infants and newborns, interpretation of results should be based  on gestational age, weight and in agreement with clinical  observations.    Premature Infant recommended reference ranges:  Up to 24 hours.............<8.0 mg/dL  Up to 48 hours............<12.0 mg/dL  3-5 days..................<15.0 mg/dL  6-29 days.................<15.0 mg/dL      Alkaline Phosphatase 04/08/2022 79  38 - 126 U/L Final    AST 04/08/2022 34  15 - 46 U/L Final    ALT 04/08/2022 44  10 - 44 U/L Final    Anion Gap 04/08/2022 8  8 - 16 mmol/L Final    eGFR if African American 04/08/2022 >60.0  >60 mL/min/1.73 m^2 Final    eGFR if non African American 04/08/2022 >60.0  >60 mL/min/1.73 m^2 Final    Comment: Calculation used to obtain the estimated glomerular filtration  rate (eGFR) is the CKD-EPI equation.       Cholesterol 04/08/2022 209 (A) 120 - 199 mg/dL Final    Comment: The National Cholesterol Education Program (NCEP) has set the  following guidelines (reference ranges) for Cholesterol:  Optimal.....................<200 mg/dL  Borderline High.............200-239 mg/dL  High........................> or = 240 mg/dL      Triglycerides 04/08/2022 247 (A) 30 - 150 mg/dL Final    Comment: The National Cholesterol Education Program (NCEP) has set the  following guidelines (reference values) for triglycerides:  Normal......................<150 mg/dL  Borderline High.............150-199 mg/dL  High........................200-499 mg/dL      HDL 04/08/2022 40  40 - 75 mg/dL Final    Comment: The National Cholesterol Education Program (NCEP) has set the  following guidelines (reference values) for HDL Cholesterol:  Low...............<40 mg/dL  Optimal...........>60  mg/dL      LDL Cholesterol 04/08/2022 119.6  63.0 - 159.0 mg/dL Final    Comment: The National Cholesterol Education Program (NCEP) has set the  following guidelines (reference values) for LDL Cholesterol:  Optimal.......................<130 mg/dL  Borderline High...............130-159 mg/dL  High..........................160-189 mg/dL  Very High.....................>190 mg/dL      HDL/Cholesterol Ratio 04/08/2022 19.1 (A) 20.0 - 50.0 % Final    Total Cholesterol/HDL Ratio 04/08/2022 5.2 (A) 2.0 - 5.0 Final    Non-HDL Cholesterol 04/08/2022 169  mg/dL Final    Comment: Risk category and Non-HDL cholesterol goals:  Coronary heart disease (CHD)or equivalent (10-year risk of CHD >20%):  Non-HDL cholesterol goal     <130 mg/dL  Two or more CHD risk factors and 10-year risk of CHD <= 20%:  Non-HDL cholesterol goal     <160 mg/dL  0 to 1 CHD risk factor:  Non-HDL cholesterol goal     <190 mg/dL      HIV 1/2 Ag/Ab 04/08/2022 Negative  Negative Final       ASSESSMENT/PLAN:  Problem List Items Addressed This Visit        Derm    Dermatitis - Primary    Overview     - may be 2/2 heat, allergins and occlusive clothing  - keep area clean and dry  - topical triamcinolone twice a day as needed  - Zyrtec OTC  - instructed to notify me if no improvement           Relevant Medications    triamcinolone acetonide 0.5% (KENALOG) 0.5 % Crea       Endocrine    Class 2 obesity due to excess calories without serious comorbidity with body mass index (BMI) of 35.0 to 35.9 in adult    Overview     - discussed recommendation for diet and cardiovascular exercise  - counseling on lifestyle modifications for risk factor reduction              Orthopedic    Left foot pain    Overview     - exam benign  - recommend foot exercises, stretches and ice  - trial meloxicam 15 mg daily for 2 weeks then p.r.n.  - recommend x-ray and see Podiatry if no improvement           Relevant Medications    meloxicam (MOBIC) 15 MG tablet          ORDERS:    Orders Placed This Encounter    triamcinolone acetonide 0.5% (KENALOG) 0.5 % Crea    meloxicam (MOBIC) 15 MG tablet       Vaccines recommended:  COVID-19    Follow-up in 1 month no improvement or sooner if any concerns.    This note is dictated using the M*Modal Fluency Direct word recognition program. There are word recognition mistakes that are occasionally missed on review.    Dr. Dotty Virk D.O.   [facility]

## 2022-06-22 NOTE — PATIENT INSTRUCTIONS
Nasal wheezing:   BreathRite strip    Rash:   Zyrtec over the counter daily for 2 weeks  Triamcinolone ointment twice day for rash    Foot:   Foot exercise  Meloxicam 15 mg daily for 2 weeks  If not better, I recommend let me know and I will get an Xray and have you see Podiatry

## 2022-07-04 ENCOUNTER — PATIENT MESSAGE (OUTPATIENT)
Dept: FAMILY MEDICINE | Facility: CLINIC | Age: 32
End: 2022-07-04
Payer: COMMERCIAL

## 2022-07-04 DIAGNOSIS — M79.672 LEFT FOOT PAIN: Primary | ICD-10-CM

## 2022-07-05 ENCOUNTER — PATIENT MESSAGE (OUTPATIENT)
Dept: UROLOGY | Facility: CLINIC | Age: 32
End: 2022-07-05
Payer: COMMERCIAL

## 2022-07-05 ENCOUNTER — PATIENT MESSAGE (OUTPATIENT)
Dept: FAMILY MEDICINE | Facility: CLINIC | Age: 32
End: 2022-07-05
Payer: COMMERCIAL

## 2022-07-05 NOTE — TELEPHONE ENCOUNTER
I recommend an Xray left foot and podiatry evaluation since not improved. Please assist Marshal Baer in scheduling    Orders Placed This Encounter   Procedures    X-Ray Foot Complete Left    Ambulatory referral/consult to Podiatry     Dr. Dotty Virk D.O.   [facility]

## 2022-07-06 ENCOUNTER — PATIENT MESSAGE (OUTPATIENT)
Dept: OTOLARYNGOLOGY | Facility: CLINIC | Age: 32
End: 2022-07-06
Payer: COMMERCIAL

## 2022-07-18 DIAGNOSIS — Z30.2 ENCOUNTER FOR VASECTOMY: ICD-10-CM

## 2022-07-18 DIAGNOSIS — Z30.2 STERILIZATION: Primary | ICD-10-CM

## 2022-07-27 NOTE — PROGRESS NOTES
Subjective:       Patient ID: Marshal Baer is a 32 y.o. male.    Chief Complaint: Hearing Loss    HPI     Marshal Baer is a 32 y.o. male with history of profound left sided hearing loss, likely congenital.  Presents for evaluation of hearing loss.  Reports difficulty hearing in noise and hearing his family.  Denies vertigo or tinnitus. Has had a MRI in the past and was told there was nothing that can bed done for his hearing loss.      Review of Systems   Constitutional: Negative.    Eyes: Negative.    Cardiovascular: Negative.    Endocrine: Negative.    Genitourinary: Negative.    Musculoskeletal: Negative.    Integumentary:  Negative.   Neurological: Negative.    Hematological: Negative.    Psychiatric/Behavioral: Negative.          Objective:      Physical Exam  Vitals and nursing note reviewed.   Constitutional:       Appearance: Normal appearance.   HENT:      Head: Normocephalic and atraumatic.      Right Ear: Tympanic membrane, ear canal and external ear normal. There is no impacted cerumen.      Left Ear: Tympanic membrane, ear canal and external ear normal. There is no impacted cerumen.   Neurological:      Mental Status: He is alert.           Data Reviewed:          Data Reviewed: shows profound loss of AD with normal hearing AS  Aside from a mild HF SNHL    Assessment:       Problem List Items Addressed This Visit    None     Visit Diagnoses     Sensorineural hearing loss (SNHL) of left ear with restricted hearing of right ear    -  Primary    Deafness in left ear              Plan:         Marshal has sensorineural of the left ear(s).  We discussed the options of surgery, traditional hearing aids, CROS, BiCROS or bone anchored hearing aids.  We discussed that in cases of asymmetrical sensorineural hearing loss, the BAHA transfers the sound through the skull from the ear with hearing loss to the better ear.  In this case, there is little improvement in sound localization, but there  [FreeTextEntry1] : \par ___________\par PATIENT SUMMARY\par ______________\par KELLY HAN 1944 \par CONTACT.  [phone]  \par PCP. DR STU CRESPO\par REFERRING MD. DR STU CRESPO\par INITIAL VISIT DATE . 7/1/2020\par ALLERGY. 6/27/2020 pncl\par HISTORY. \par Pt sees me for copd He has ho PE in setting of COVID has a fib on eliquis  He also has abn ct chest \par ___________\par PROBLEM DATA\par ______________\par     \par COPD\par 10/16/2020  FVC 3.78 96% FEV1 2.60 101% FEV1/FVC 69% FEF 25 75 68% \par % FRC N2 116% RV/% DLCO 94% DL/VA 86%\par MILD OBSTRUCTION pRESERVED DIFFUSN SUGGESTS CHRONIC RONCHITIS OR ASTHMA \par Patient has hyperinflation air trapping\par \par LUNG NODULE\par FIBROTIC SCAR R BASE ON CXR DONE 7/13/2020 (NEW FROM 2018)\par ct chest 4/19/2021 cw 10/9/2020 \par rll linear opac unchanged 1 y ct recommended \par 3 mm lll pulm nodule nsc \par  \par \par FORMER SMOKER\par QUIT SMOKING 2018 PIPE SMOKER \par \par COVID PNEUMONIA 4/2020  \par \par PULMONARY EMBOLISM  4/2020  \par Is on apixaban for a fib (indefinitely) \par  \par \par HFPEF\par 6/23/2020 ECHO N lvsf \par 7/22/2020 Again advised to call his cardio and dw him re lisinopril as that can cause hyperkalemia \par \par A fib \par 6/23/2020 ekg a fib \par HO endovenous ablation of bl great saphenous vein\par ____________________________________\par HFPEF\par ___________________________\par \par BNP  \par BNP (0-300)  7/1 bnp 345\par ECHO\par 6/23/2020 ECHO N lvsf \par PEDAL EDEMA \par 10/21/2020 1 plus \par _______________________\par PULMONARY EMBOLISM  4/2020  \par ______________________-\par HISTORY.\par Had pe in setting of COVID 4/2020\par V DUPLX \par 7/13/2020 V duplex legs  [phone] Neg-jose raul \par BNP \par 7/3/2020 bnp 345 \par D-dimr \par 7/3/2020 d-dimr 154 \par MEDS \par Is on eliquis for a fib\par PLANNED DURATION OF RX WITH ELIQUIS\par 9/16/2020 Was asked by his cardio to continue apixaban (presumably for is a fib)\par ASSESSMENT RECOMMENDATION\par VTE recurrence is unlikely being that he is on apixaban\par ___________\par COPD/ASTHMA \par ____________\par \par DIAGNOSIS.\par Former pipe smoker \par PFTS 10/16/2020 FEV1/FVC 69% FEF 2575 68% DLCO 94%\par 2/24/2021 Feels better while on symbicort\par 12/16/2020 Feels bettwe with symbicort\par \par ______________\par RECOMMENDATIONS .\par \par 7/27/2022 Doing well with symbicort 160 bid\par 7/27/2022 Cont rx\par \par \par \par ______________\par FIBROTIC SCAR R BASE ON CXR DONE 7/13/2020 (NEW FROM 2018)\par _______________\par \par SMOKING HISTRY\par Used to pipe smoke \par Quit 4/2020\par CT\par ct chest 4/19/2021 cw 10/9/2020 \par rll linear opac unchanged 1 y ct recommended \par 3 mm lll pulm nodule nsc \par CXR \par 7/13/2020 CXR  [phone]  \par Hyperinflation suggestive of copd \par Disc atelect v fibrotic scarring r base which is new cw 2018 \par nsc subcm nodule l base\par ASSESSMENT \par Former smoker \par HO COVID\par Sacrring and nodule in lung on 4/2021 ct ch\par RECOMMENDATION\par Will plan CT ch 10/2022\par ______________\par __________\par LUNG NODULE \par _____________\par \par HABITS former pipe smoker quit 4/2020\par \par CT \par ct chest 4/19/2021 cw 10/9/2020 \par rll linear opac unchanged 1 y ct recommended \par 3 mm lll pulm nodule nsc \par AEC 7/3/2020  \par CT \par CT chest 10/9/2020 \par 1) RLL thick linear opacity likely atelectasis \par 2) lll subpleural 3 mm nodulelikely represents intrapulmonary lymph node\par \par ASSESSMENT \par Former smoker \par HO COVID\par Sacrring and nodule in lung on 4/2021 ct ch\par \par RECOMMENDATIONS \par 7/27/2022 repeat CT ch 10/2022 \par 7/27/2022 RTC 11/2022 \par ________________________________\par NEED FOR HOME OXYGEN\par __________________________ .\par \par PATIENT DATA. \par PULSE OXIMETRY AT REST.\par 7/27/2022 ra rest 97%\par 1/19/2022 ra rest po 96%\par \par ASSESSMENT RECOMMENDATIONS.\par Does not need home O2\par \par ______\par OBESITY .\par __________\par ASSESSMENT RECOMMENDATIONS. \par Not obese\par \par PATIENT DATA. \par WEIGHT.\par 7/27/2022 178\par 1/19/2022 wt 175 lb\par BMI.\par 7/27/2022 27\par 1/19/2022 bmi 26\par \par \par \par \par  should be a dramatic improvement in the head shadow effect.  In cases of conductive hearing loss, the BAHA is primarily to bypass the break in conduction to deliver sound to the ear that actually has the hearing loss.  In this case, there can not only be an improvement in the head shadow effect but also in sound localization.  Also, these patients may be candidates for magnetic attachments rather than an abutment.  Marshal has not yet undergone BAHA simulation.  After a lengthy discussion, Marshal would like to pursue A BC implant.     The diagnosis, details of the surgery, postoperative care, and options of treatment were discussed at length. The risks of surgery were discussed including but not limited to local wound breakdown, permanent deafness/hearing loss, disabling dizziness, infection, bleeding, recurrence, need for additional surgery, and cardiopulmonary complications from anesthesia. I answered the patient's questions to satisfaction.         Answers for HPI/ROS submitted by the patient on 5/7/2022  Snoring?: Yes  Acid Reflux?: Yes  Seasonal Allergies?: Yes

## 2022-07-29 ENCOUNTER — PATIENT MESSAGE (OUTPATIENT)
Dept: FAMILY MEDICINE | Facility: CLINIC | Age: 32
End: 2022-07-29
Payer: COMMERCIAL

## 2022-08-01 ENCOUNTER — OFFICE VISIT (OUTPATIENT)
Dept: FAMILY MEDICINE | Facility: CLINIC | Age: 32
End: 2022-08-01
Payer: COMMERCIAL

## 2022-08-01 VITALS
HEART RATE: 72 BPM | WEIGHT: 268.94 LBS | HEIGHT: 73 IN | BODY MASS INDEX: 35.64 KG/M2 | DIASTOLIC BLOOD PRESSURE: 78 MMHG | SYSTOLIC BLOOD PRESSURE: 122 MMHG | OXYGEN SATURATION: 98 %

## 2022-08-01 DIAGNOSIS — B08.4 HAND, FOOT AND MOUTH DISEASE: ICD-10-CM

## 2022-08-01 DIAGNOSIS — R06.83 SNORING: ICD-10-CM

## 2022-08-01 DIAGNOSIS — R06.2 WHEEZING: Primary | ICD-10-CM

## 2022-08-01 PROCEDURE — 1160F RVW MEDS BY RX/DR IN RCRD: CPT | Mod: CPTII,S$GLB,,

## 2022-08-01 PROCEDURE — 99999 PR PBB SHADOW E&M-EST. PATIENT-LVL IV: CPT | Mod: PBBFAC,,,

## 2022-08-01 PROCEDURE — 99214 OFFICE O/P EST MOD 30 MIN: CPT | Mod: S$GLB,,,

## 2022-08-01 PROCEDURE — 1159F PR MEDICATION LIST DOCUMENTED IN MEDICAL RECORD: ICD-10-PCS | Mod: CPTII,S$GLB,,

## 2022-08-01 PROCEDURE — 1160F PR REVIEW ALL MEDS BY PRESCRIBER/CLIN PHARMACIST DOCUMENTED: ICD-10-PCS | Mod: CPTII,S$GLB,,

## 2022-08-01 PROCEDURE — 3074F SYST BP LT 130 MM HG: CPT | Mod: CPTII,S$GLB,,

## 2022-08-01 PROCEDURE — 1159F MED LIST DOCD IN RCRD: CPT | Mod: CPTII,S$GLB,,

## 2022-08-01 PROCEDURE — 3074F PR MOST RECENT SYSTOLIC BLOOD PRESSURE < 130 MM HG: ICD-10-PCS | Mod: CPTII,S$GLB,,

## 2022-08-01 PROCEDURE — 3078F PR MOST RECENT DIASTOLIC BLOOD PRESSURE < 80 MM HG: ICD-10-PCS | Mod: CPTII,S$GLB,,

## 2022-08-01 PROCEDURE — 3078F DIAST BP <80 MM HG: CPT | Mod: CPTII,S$GLB,,

## 2022-08-01 PROCEDURE — 99999 PR PBB SHADOW E&M-EST. PATIENT-LVL IV: ICD-10-PCS | Mod: PBBFAC,,,

## 2022-08-01 PROCEDURE — 3008F PR BODY MASS INDEX (BMI) DOCUMENTED: ICD-10-PCS | Mod: CPTII,S$GLB,,

## 2022-08-01 PROCEDURE — 99214 PR OFFICE/OUTPT VISIT, EST, LEVL IV, 30-39 MIN: ICD-10-PCS | Mod: S$GLB,,,

## 2022-08-01 PROCEDURE — 3008F BODY MASS INDEX DOCD: CPT | Mod: CPTII,S$GLB,,

## 2022-08-01 RX ORDER — ALBUTEROL SULFATE 90 UG/1
2 AEROSOL, METERED RESPIRATORY (INHALATION) EVERY 6 HOURS PRN
Qty: 18 G | Refills: 0 | Status: SHIPPED | OUTPATIENT
Start: 2022-08-01 | End: 2022-08-31

## 2022-08-01 RX ORDER — ALBUTEROL SULFATE 0.63 MG/3ML
0.63 SOLUTION RESPIRATORY (INHALATION) EVERY 6 HOURS PRN
Qty: 90 ML | Refills: 0 | Status: SHIPPED | OUTPATIENT
Start: 2022-08-01 | End: 2023-10-05

## 2022-08-01 RX ORDER — PREDNISONE 20 MG/1
20 TABLET ORAL DAILY
Qty: 5 TABLET | Refills: 0 | Status: SHIPPED | OUTPATIENT
Start: 2022-08-01 | End: 2022-08-06

## 2022-08-01 NOTE — PROGRESS NOTES
Subjective:       Patient ID: Marshal Baer is a 32 y.o. male.    Chief Complaint: Follow-up (Wheezing been going on for a couple of months had asthma as a child/just getting over hand/foot and mouth), Sore Throat (Just getting over covid also), Shortness of Breath (sometimes), and Fever    Marshal Baer is a 32 y.o male patient of Dr. Virk with anxiety, hearing loss, BPH, class 2 obesity, GERD, unknown to me, presents today with spouse with c/o wheezing, hand foot and mouth disease.     History of asthma as a child, last remember being on meds around age 10. Recently had 3 weeks ago, cough for a few days; wife reports snoring with some periods of apnea. Wife also reports wheezing during that night when sleeping.     Patient reports wheezing during the day and at night; happens in periods of time;   Flares at night when doing nothing; flares up at home; more prominent than usual. Was discussed with PCP a few months back.     Reports has hand foot and mouth disease from his kids who were all sick; started with fever yesterday, broke fever last night, bumps started showing up on hands and feet.                 Wheezing   This is a new problem. The current episode started more than 1 month ago (a few months ago ). The problem occurs intermittently. The problem has been gradually worsening. Associated symptoms include coughing (clean mucus, was given Singulair to help with this ), diarrhea, a rash, shortness of breath (with movement or running), a sore throat and sputum production. Pertinent negatives include no abdominal pain, chest pain, chills, coryza, ear pain, fever, headaches, hemoptysis, neck pain, swollen glands or vomiting. The symptoms are aggravated by exercise, lying flat, weather changes, pollens and odors. He has tried nothing (sleeps with 2 pillows ) for the symptoms. His past medical history is significant for asthma. There is no history of bronchiolitis, CAD, chronic lung disease,  COPD, DVT, heart failure, past MI, PE or pneumonia.         Past Medical History:   Diagnosis Date    Anxiety     Deafness in right ear     since birth    Varicocele 8/17/2016       Past Surgical History:   Procedure Laterality Date    COLONOSCOPY N/A 1/12/2021    Procedure: COLONOSCOPY;  Surgeon: Valentina Patton MD;  Location: [facility];  Service: Endoscopy;  Laterality: N/A;    ENDOSCOPY  11/2019    ESOPHAGOGASTRODUODENOSCOPY N/A 8/7/2020    Procedure: EGD (ESOPHAGOGASTRODUODENOSCOPY);  Surgeon: Valentina Patton MD;  Location: [facility];  Service: Endoscopy;  Laterality: N/A;    VASECTOMY  12/27/2019       Family History   Problem Relation Age of Onset    Hypertension Mother     Lung cancer Mother     Hypertension Father     Diabetes Father     Cancer Maternal Grandmother         Breast  cancer 2006    No Known Problems Maternal Grandfather     Heart disease Paternal Uncle 42        Heart Attack in 1999    Prostate cancer Neg Hx     Kidney disease Neg Hx        Social History     Socioeconomic History    Marital status:     Number of children: 2   Occupational History     Employer: [employer]   Tobacco Use    Smoking status: Never Smoker    Smokeless tobacco: Never Used   Substance and Sexual Activity    Alcohol use: Yes     Comment: rarely    Drug use: No    Sexual activity: Yes     Partners: Female   Social History Narrative    Lives in [place] with his wife and son born in 2017. He was  in December 22, 2014. He was employed by the [employer]. 2 children (son and daughter born 2019).      Social Determinants of Health     Financial Resource Strain: Low Risk     Difficulty of Paying Living Expenses: Not hard at all   Food Insecurity: No Food Insecurity    Worried About Running Out of Food in the Last Year: Never true    Ran Out of Food in the Last Year: Never true   Transportation Needs: No Transportation Needs    Lack of  "Transportation (Medical): No    Lack of Transportation (Non-Medical): No   Physical Activity: Sufficiently Active    Days of Exercise per Week: 7 days    Minutes of Exercise per Session: 70 min   Stress: No Stress Concern Present    Feeling of Stress : Not at all   Social Connections: Unknown    Frequency of Communication with Friends and Family: Three times a week    Frequency of Social Gatherings with Friends and Family: More than three times a week    Active Member of Clubs or Organizations: No    Attends Club or Organization Meetings: Never    Marital Status:    Housing Stability: Low Risk     Unable to Pay for Housing in the Last Year: No    Number of Places Lived in the Last Year: 1    Unstable Housing in the Last Year: No       Review of Systems   Constitutional: Negative for chills and fever.   HENT: Positive for sore throat. Negative for ear pain, postnasal drip, sinus pressure, sinus pain and sneezing.    Respiratory: Positive for cough (clean mucus, was given Singulair to help with this ), sputum production, shortness of breath (with movement or running) and wheezing. Negative for hemoptysis.    Cardiovascular: Negative for chest pain.   Gastrointestinal: Positive for diarrhea. Negative for abdominal pain, blood in stool, constipation and vomiting.   Musculoskeletal: Negative for arthralgias and neck pain.   Skin: Positive for rash.   Neurological: Negative for headaches.         Objective:     Vitals:    08/01/22 1029   BP: 122/78   Pulse: 72   SpO2: 98%   Weight: 122 kg (268 lb 15.4 oz)   Height: 6' 1" (1.854 m)          Physical Exam  Vitals reviewed.   Constitutional:       Appearance: Normal appearance. He is well-developed.   HENT:      Head: Normocephalic and atraumatic.      Right Ear: Tympanic membrane normal.      Left Ear: Tympanic membrane normal.      Nose: Nose normal.      Right Turbinates: Not swollen.      Left Turbinates: Not swollen.      Mouth/Throat:      Comments: " Mild erythema noted to posterior pharynx. Elongated Uvula.   Eyes:      Pupils: Pupils are equal, round, and reactive to light.   Cardiovascular:      Rate and Rhythm: Normal rate and regular rhythm.      Heart sounds: Normal heart sounds.   Pulmonary:      Effort: Pulmonary effort is normal.      Breath sounds: Normal breath sounds. No wheezing, rhonchi or rales.   Musculoskeletal:         General: Normal range of motion.      Cervical back: Normal range of motion.      Right lower leg: No edema.      Left lower leg: No edema.   Skin:     General: Skin is warm and dry.      Findings: Rash present. Rash is macular and papular.      Comments: To hands and feet; eyrthema noted    Neurological:      General: No focal deficit present.      Mental Status: He is alert and oriented to person, place, and time.   Psychiatric:         Mood and Affect: Mood normal.         Speech: Speech normal.         Behavior: Behavior is cooperative.           Laboratory:  CBC:  No results for input(s): WBC, RBC, HGB, HCT, PLT, MCV, MCH, MCHC in the last 2160 hours.  CMP:  No results for input(s): GLU, CALCIUM, ALBUMIN, PROT, NA, K, CO2, CL, BUN, ALKPHOS, ALT, AST, BILITOT in the last 2160 hours.    Invalid input(s): CREATININ  URINALYSIS:  No results for input(s): COLORU, CLARITYU, SPECGRAV, PHUR, PROTEINUA, GLUCOSEU, BILIRUBINCON, BLOODU, WBCU, RBCU, BACTERIA, MUCUS, NITRITE, LEUKOCYTESUR, UROBILINOGEN, HYALINECASTS in the last 2160 hours.   LIPIDS:  No results for input(s): TSH, HDL, CHOL, TRIG, LDLCALC, CHOLHDL, NONHDLCHOL, TOTALCHOLEST in the last 2160 hours.  TSH:  No results for input(s): TSH in the last 2160 hours.  A1C:  No results for input(s): HGBA1C in the last 2160 hours.    Assessment:         ICD-10-CM ICD-9-CM   1. Wheezing  R06.2 786.07   2. Snoring  R06.83 786.09   3. Hand, foot and mouth disease  B08.4 074.3       Plan:       Wheezing  -     Ambulatory referral/consult to Sleep Disorders; Future; Expected date:  08/08/2022  -     albuterol (VENTOLIN HFA) 90 mcg/actuation inhaler; Inhale 2 puffs into the lungs every 6 (six) hours as needed for Wheezing or Shortness of Breath. Rescue  Dispense: 18 g; Refill: 0  -     albuterol (ACCUNEB) 0.63 mg/3 mL Nebu; Take 3 mLs (0.63 mg total) by nebulization every 6 (six) hours as needed (wheezing). Rescue  Dispense: 90 mL; Refill: 0  -     predniSONE (DELTASONE) 20 MG tablet; Take 1 tablet (20 mg total) by mouth once daily. for 5 days  Dispense: 5 tablet; Refill: 0  -Take above listed medications as directed. Instructed to use either the nebulizer or the inhaler but do not use both at the same time, must be within 6 hours of each other.  -Hydrate  -Take OTC Tylenol or Ibuprofen for fever  -Rest   -Increase water/fluid intake  -drink hot or cold fluids, use throat lozenges for sore throat      Snoring  -     Ambulatory referral/consult to Sleep Disorders; Future; Expected date: 08/08/2022    Hand, foot and mouth disease  Instructed to stay home until symptoms resolve,  should resolve within 7 days,   Instructed on good handwashing   OTC meds for pain as needed    If symptoms worsen, go to ER.  RTC as needed.  Keep appointment with Sleep Medicine.     Patient verbalizes understanding and agrees with current treatment plan.      Follow up if symptoms worsen or fail to improve.     Patient's Medications   New Prescriptions    ALBUTEROL (ACCUNEB) 0.63 MG/3 ML NEBU    Take 3 mLs (0.63 mg total) by nebulization every 6 (six) hours as needed (wheezing). Rescue    ALBUTEROL (VENTOLIN HFA) 90 MCG/ACTUATION INHALER    Inhale 2 puffs into the lungs every 6 (six) hours as needed for Wheezing or Shortness of Breath. Rescue    PREDNISONE (DELTASONE) 20 MG TABLET    Take 1 tablet (20 mg total) by mouth once daily. for 5 days   Previous Medications    AZELASTINE (ASTELIN) 137 MCG (0.1 %) NASAL SPRAY    azelastine 137 mcg (0.1 %) nasal spray aerosol   Spray 1 spray twice a day by intranasal route as  needed.    DICYCLOMINE (BENTYL) 20 MG TABLET    Take 1 tablet (20 mg total) by mouth 3 (three) times daily as needed (abdominal pain).    FLUOXETINE 20 MG CAPSULE    Take 1 capsule (20 mg total) by mouth once daily.    MELOXICAM (MOBIC) 15 MG TABLET    Take 1 tablet (15 mg total) by mouth once daily.    MONTELUKAST (SINGULAIR) 10 MG TABLET    Take 1 tablet (10 mg total) by mouth every evening.    PANTOPRAZOLE (PROTONIX) 40 MG TABLET    Take 1 tablet (40 mg total) by mouth once daily.    TAMSULOSIN (FLOMAX) 0.4 MG CAP    Take 1 capsule (0.4 mg total) by mouth once daily.    TRIAMCINOLONE ACETONIDE 0.5% (KENALOG) 0.5 % CREA    Apply topically 2 (two) times daily.   Modified Medications    No medications on file   Discontinued Medications    No medications on file       Mary Pollard NP

## 2022-08-01 NOTE — LETTER
August 1, 2022      [facility]  [address], [address]  [place] [address]  Phone: [phone]  Fax: [phone]       Patient: Marshal Baer   YOB: 1990  Date of Visit: 08/01/2022    To Whom It May Concern:    Madai Baer  was at [facility] on 08/01/2022. The patient may return to work/school on 08/08/2022 with no restrictions. If you have any questions or concerns, or if I can be of further assistance, please do not hesitate to contact me.    Sincerely,    Mary Pollard, NP

## 2022-08-02 DIAGNOSIS — M79.672 LEFT FOOT PAIN: Primary | ICD-10-CM

## 2022-08-03 ENCOUNTER — PATIENT MESSAGE (OUTPATIENT)
Dept: PODIATRY | Facility: CLINIC | Age: 32
End: 2022-08-03
Payer: COMMERCIAL

## 2022-08-04 ENCOUNTER — PATIENT MESSAGE (OUTPATIENT)
Dept: FAMILY MEDICINE | Facility: CLINIC | Age: 32
End: 2022-08-04
Payer: COMMERCIAL

## 2022-08-22 ENCOUNTER — TELEPHONE (OUTPATIENT)
Dept: SLEEP MEDICINE | Facility: CLINIC | Age: 32
End: 2022-08-22
Payer: COMMERCIAL

## 2022-08-22 ENCOUNTER — PATIENT MESSAGE (OUTPATIENT)
Dept: SLEEP MEDICINE | Facility: CLINIC | Age: 32
End: 2022-08-22
Payer: COMMERCIAL

## 2022-08-30 ENCOUNTER — PATIENT MESSAGE (OUTPATIENT)
Dept: FAMILY MEDICINE | Facility: CLINIC | Age: 32
End: 2022-08-30
Payer: COMMERCIAL

## 2022-09-07 ENCOUNTER — PATIENT MESSAGE (OUTPATIENT)
Dept: FAMILY MEDICINE | Facility: CLINIC | Age: 32
End: 2022-09-07
Payer: COMMERCIAL

## 2022-09-15 ENCOUNTER — OFFICE VISIT (OUTPATIENT)
Dept: FAMILY MEDICINE | Facility: CLINIC | Age: 32
End: 2022-09-15
Payer: COMMERCIAL

## 2022-09-15 VITALS
SYSTOLIC BLOOD PRESSURE: 126 MMHG | WEIGHT: 265.75 LBS | HEIGHT: 72 IN | DIASTOLIC BLOOD PRESSURE: 82 MMHG | HEART RATE: 76 BPM | OXYGEN SATURATION: 97 % | TEMPERATURE: 98 F | BODY MASS INDEX: 35.99 KG/M2

## 2022-09-15 DIAGNOSIS — J45.40 MODERATE PERSISTENT ASTHMA WITHOUT COMPLICATION: Primary | ICD-10-CM

## 2022-09-15 DIAGNOSIS — Z76.89 ENCOUNTER TO ESTABLISH CARE: ICD-10-CM

## 2022-09-15 DIAGNOSIS — R06.2 WHEEZING ON EXPIRATION: ICD-10-CM

## 2022-09-15 PROBLEM — K44.9 HIATAL HERNIA WITH GASTROESOPHAGEAL REFLUX DISEASE AND ESOPHAGITIS: Status: ACTIVE | Noted: 2022-09-15

## 2022-09-15 PROBLEM — K21.00 HIATAL HERNIA WITH GASTROESOPHAGEAL REFLUX DISEASE AND ESOPHAGITIS: Status: ACTIVE | Noted: 2022-09-15

## 2022-09-15 PROCEDURE — 3008F BODY MASS INDEX DOCD: CPT | Mod: CPTII,S$GLB,, | Performed by: NURSE PRACTITIONER

## 2022-09-15 PROCEDURE — 99999 PR PBB SHADOW E&M-EST. PATIENT-LVL IV: CPT | Mod: PBBFAC,,, | Performed by: NURSE PRACTITIONER

## 2022-09-15 PROCEDURE — 90471 PNEUMOCOCCAL CONJUGATE VACCINE 20-VALENT: ICD-10-PCS | Mod: S$GLB,,, | Performed by: NURSE PRACTITIONER

## 2022-09-15 PROCEDURE — 99214 PR OFFICE/OUTPT VISIT, EST, LEVL IV, 30-39 MIN: ICD-10-PCS | Mod: 25,S$GLB,, | Performed by: NURSE PRACTITIONER

## 2022-09-15 PROCEDURE — 99999 PR PBB SHADOW E&M-EST. PATIENT-LVL IV: ICD-10-PCS | Mod: PBBFAC,,, | Performed by: NURSE PRACTITIONER

## 2022-09-15 PROCEDURE — 1160F PR REVIEW ALL MEDS BY PRESCRIBER/CLIN PHARMACIST DOCUMENTED: ICD-10-PCS | Mod: CPTII,S$GLB,, | Performed by: NURSE PRACTITIONER

## 2022-09-15 PROCEDURE — 90677 PCV20 VACCINE IM: CPT | Mod: S$GLB,,, | Performed by: NURSE PRACTITIONER

## 2022-09-15 PROCEDURE — 1159F PR MEDICATION LIST DOCUMENTED IN MEDICAL RECORD: ICD-10-PCS | Mod: CPTII,S$GLB,, | Performed by: NURSE PRACTITIONER

## 2022-09-15 PROCEDURE — 90471 IMMUNIZATION ADMIN: CPT | Mod: S$GLB,,, | Performed by: NURSE PRACTITIONER

## 2022-09-15 PROCEDURE — 1159F MED LIST DOCD IN RCRD: CPT | Mod: CPTII,S$GLB,, | Performed by: NURSE PRACTITIONER

## 2022-09-15 PROCEDURE — 1160F RVW MEDS BY RX/DR IN RCRD: CPT | Mod: CPTII,S$GLB,, | Performed by: NURSE PRACTITIONER

## 2022-09-15 PROCEDURE — 3008F PR BODY MASS INDEX (BMI) DOCUMENTED: ICD-10-PCS | Mod: CPTII,S$GLB,, | Performed by: NURSE PRACTITIONER

## 2022-09-15 PROCEDURE — 90677 PNEUMOCOCCAL CONJUGATE VACCINE 20-VALENT: ICD-10-PCS | Mod: S$GLB,,, | Performed by: NURSE PRACTITIONER

## 2022-09-15 PROCEDURE — 99214 OFFICE O/P EST MOD 30 MIN: CPT | Mod: 25,S$GLB,, | Performed by: NURSE PRACTITIONER

## 2022-09-15 RX ORDER — FLUTICASONE PROPIONATE AND SALMETEROL XINAFOATE 115; 21 UG/1; UG/1
2 AEROSOL, METERED RESPIRATORY (INHALATION) EVERY 12 HOURS
Qty: 12 G | Refills: 1 | Status: SHIPPED | OUTPATIENT
Start: 2022-09-15 | End: 2022-11-30 | Stop reason: SDUPTHER

## 2022-09-15 NOTE — LETTER
September 15, 2022      [address] [address]  [place] [address]  Phone: [phone]  Fax: [phone]       Patient: Marshal Baer   YOB: 1990  Date of Visit: 09/15/2022    To Whom It May Concern:    Madai Baer  was at [facility] on 09/15/2022. The patient may return to work/school on 9/15/2022 with restrictions. He is to wear mask/respirator from work when dealing with any chemical that can exacerbate respiratory symptoms/wheezing that carries a respiratory warning sign.    If you have any questions or concerns, or if I can be of further assistance, please do not hesitate to contact me.    Sincerely,    Emily Barksdale, NP

## 2022-09-15 NOTE — PROGRESS NOTES
Subjective:       Patient ID: Marshal Baer is a 32 y.o. male.    Chief Complaint: Wheezing, Nasal Congestion, Shortness of Breath, Cough, and Chest Congestion    HPI    ###NEW PATIENT TO ME:  previous patient of Dr. Virk###    Patient is a 32 year old white male with reported history of Asthma as a child, chronic allergies, deaf in left ear since birth, Fatty Liver with history of elevated LFTs, small hiatal hernia with chronic GERD, Obesity, Anxiety and intermittent explosive disorder,  and BPH with urinary frequency followed by [facility] that is here today with complaint of wheezing that is worse at night with congestion/cough going on since January 2022.    Wheezing with congestion  Started with wheezing since January 2022.    At first he thought secondary from viral infection as he had covid 19 in July 2021 and had prolonged cough.  It intermittently improved from January up until around July when the wheezing has been persistent.  Reports wheezing since early July intermittently throughout the day and much worse at night while sleeping.  He does have appt with Sleep Medicine MD on 10/20/2022  He reports he had asthma as a child that he grew out of.  He had chronic allergies/rhinitis  He went to see Mary Mike NP for the wheezing on 8/1/2022 after wheezing for at least 3 weeks.  She treated with rescue inhaler, albuterol nebulizer for at night and Prednisone 20 mg daily for 5 days.  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  Ethylenediamine  Methyl Carbitol solvent industrial grade  Methoxytriglycol  This is other  chemicals he brought in that he handles but do not have respiratory warning:  Tetraethylene Glycol HP  Methooxytriglycol Silicone Grade  2-Ethylhexyl Acrylate, 15 ppm MEHQ  Triethylene Glycol HP  Methoxpolyglycol basic  Monoethanolamine  He believe that this exposure may be what is exacerbating asthma.  He reports he has been having to do a albuterol nebulizer every night before bed as well as use rescue inhaler usually at least once daily.  Patient does have bilateral wheezing mostly on expiration present.  Pulse ox 97%  Will get chest xray and pulmonary function tests  Pneumonia vaccine today  Will start on controller medication Advair.    Review of Systems   Constitutional:  Negative for chills and fever.   HENT:  Positive for congestion, hearing loss and postnasal drip. Negative for sinus pressure, sinus pain and sore throat.         Deaf to left ear since birth   Eyes: Negative.    Respiratory:  Positive for cough, shortness of breath and wheezing.    Cardiovascular:  Negative for chest pain.   Gastrointestinal:  Negative for abdominal pain.   Genitourinary:  Negative for difficulty urinating.   Musculoskeletal:  Negative for back pain and neck pain.   Skin:  Negative for rash.   Neurological:  Negative for dizziness, tremors, seizures, facial asymmetry, speech difficulty and numbness.   Psychiatric/Behavioral:  Negative for decreased concentration and dysphoric mood. The patient is not nervous/anxious.        Objective:     Vitals:    09/15/22 1344   BP: 126/82   BP Location: Left arm   Patient Position: Sitting   BP Method: Large (Manual)   Pulse: 76   Temp: 97.7 °F (36.5 °C)   TempSrc: Temporal   SpO2: 97%   Weight: 120.5 kg (265 lb 12.2 oz)   Height: 6' (1.829 m)          Physical Exam  Constitutional:       General: He is not in acute distress.     Appearance: Normal appearance. He is obese. He is not toxic-appearing or diaphoretic.      Comments: Body mass index is 36.04 kg/m².     HENT:      Head:  Normocephalic and atraumatic.      Right Ear: Tympanic membrane, ear canal and external ear normal.      Left Ear: Tympanic membrane, ear canal and external ear normal.      Ears:      Comments: Deaf left ear     Nose: Congestion present.      Mouth/Throat:      Pharynx: No oropharyngeal exudate or posterior oropharyngeal erythema.   Eyes:      General:         Right eye: No discharge.         Left eye: No discharge.      Extraocular Movements: Extraocular movements intact.      Conjunctiva/sclera: Conjunctivae normal.   Cardiovascular:      Rate and Rhythm: Normal rate and regular rhythm.      Heart sounds: Normal heart sounds.   Pulmonary:      Effort: Pulmonary effort is normal. No respiratory distress.      Breath sounds: Wheezing present.   Abdominal:      General: There is no distension.   Musculoskeletal:         General: Normal range of motion.   Skin:     General: Skin is warm and dry.   Neurological:      Mental Status: He is alert and oriented to person, place, and time.   Psychiatric:         Mood and Affect: Mood normal.         Behavior: Behavior normal.         Thought Content: Thought content normal.         Judgment: Judgment normal.         Assessment:         ICD-10-CM ICD-9-CM   1. Wheezing on expiration  R06.2 786.07   2. Moderate persistent asthma without complication  J45.40 493.90       Plan:       Moderate persistent asthma without complication  Start Advair 2 puffs twice daily  CXR and PFTs  Follow up in 6 weeks  Wrote note for work that he is to wear respirator/mask for any chemical that has respiratory warning.  -     fluticasone propion-salmeterol 115-21 mcg/dose (ADVAIR HFA) 115-21 mcg/actuation HFAA inhaler; Inhale 2 puffs into the lungs every 12 (twelve) hours. Controller  Dispense: 12 g; Refill: 1  -     Complete PFT w/ bronchodilator; Future  -     (In Office Administered) Pneumococcal Conjugate Vaccine (20 Valent) (IM)    Wheezing on expiration  -     X-Ray Chest PA And Lateral;  Future; Expected date: 09/15/2022  -     Complete PFT w/ bronchodilator; Future    Encounter to establish care    Follow up in about 6 weeks (around 10/27/2022) for follow up.     Patient's Medications   New Prescriptions    No medications on file   Previous Medications    ALBUTEROL (ACCUNEB) 0.63 MG/3 ML NEBU    Take 3 mLs (0.63 mg total) by nebulization every 6 (six) hours as needed (wheezing). Rescue    FLUOXETINE 20 MG CAPSULE    Take 1 capsule (20 mg total) by mouth once daily.    MONTELUKAST (SINGULAIR) 10 MG TABLET    Take 1 tablet (10 mg total) by mouth every evening.    PANTOPRAZOLE (PROTONIX) 40 MG TABLET    Take 1 tablet (40 mg total) by mouth once daily.   Modified Medications    No medications on file   Discontinued Medications    No medications on file       Past Medical History:   Diagnosis Date    Anxiety     Benign prostatic hyperplasia with urinary frequency 12/10/2020    Class 2 obesity due to excess calories without serious comorbidity with body mass index (BMI) of 35.0 to 35.9 in adult 05/07/2020    - discussed recommendation for diet and cardiovascular exercise - counseling on lifestyle modifications for risk factor reduction    Deafness in right ear     since birth    Elevated LFTs 01/04/2021    Lab Results Component Value Date  ALT 44 04/08/2022  AST 34 04/08/2022  ALKPHOS 79 04/08/2022  BILITOT 0.4 04/08/2022  - 7/10/2018 hepatitis C negative - 3/23/22 CT abd/pelvis: fatty liver disease normal - likely related with diet and lifestyle - pt denies alcohol use - discussed recommendation for diet and cardiovascular exercise - counseling on lifestyle modifications for risk factor reduction -    Epididymal cyst 04/19/2021    Noted on u/s dated 8/17/2016.     Fatty liver 04/12/2022    - 03/23/2022 CT abdomen pelvis:  Diffuse hepatic steatosis similar to prior exams. - 7/10/2018 hepatitis C negative Lab Results Component Value Date  ALT 44 04/08/2022  AST 34 04/08/2022  ALKPHOS 79 04/08/2022   BILITOT 0.4 04/08/2022  - discussed recommendation for diet and cardiovascular exercise - counseling on lifestyle modifications for risk factor reduction    Gastroesophageal reflux disease without esophagitis 01/30/2020    - followed by gastroenterology - 08/07/2020 EGD:  Small hiatal hernia.  Negative H pylori.  No dysplasia.  No esophagitis    Hiatal hernia with gastroesophageal reflux disease and esophagitis     History of peptic ulcer 07/10/2020    Intermittent explosive disorder 01/31/2020    Varicocele 08/17/2016       Past Surgical History:   Procedure Laterality Date    COLONOSCOPY N/A 1/12/2021    Procedure: COLONOSCOPY;  Surgeon: Valentina Patton MD;  Location: [facility] END[facility];  Service: Endoscopy;  Laterality: N/A;    ENDOSCOPY  11/2019    ESOPHAGOGASTRODUODENOSCOPY N/A 8/7/2020    Procedure: EGD (ESOPHAGOGASTRODUODENOSCOPY);  Surgeon: Valentina Patton MD;  Location: [facility] ENDO;  Service: Endoscopy;  Laterality: N/A;    VASECTOMY  12/27/2019       Family History   Problem Relation Age of Onset    Heart disease Mother         aortic calcifications    Hyperlipidemia Mother     Diabetes Mother     Cancer Mother         lung cancer 55; thyroid cancer 55, colon cancer 55, lesion on the liver    Hypertension Mother     Lung cancer Mother     Hyperlipidemia Father     Heart disease Father         MI - age 30 - stents placed; CHF    Hypertension Father     Diabetes Father     Alcohol abuse Father     Heart disease Paternal Uncle 42        Heart Attack in 1999    Cancer Maternal Grandmother         Breast  cancer 2006    No Known Problems Maternal Grandfather     Prostate cancer Neg Hx     Kidney disease Neg Hx        Social History     Socioeconomic History    Marital status:     Number of children: 2   Occupational History     Employer: [employer]    Occupation: chemical    Tobacco Use    Smoking status: Never    Smokeless tobacco: Never   Substance and Sexual Activity     Alcohol use: Yes     Comment: rarely    Drug use: No    Sexual activity: Yes     Partners: Female     Social Determinants of Health     Financial Resource Strain: Low Risk     Difficulty of Paying Living Expenses: Not hard at all   Food Insecurity: No Food Insecurity    Worried About Running Out of Food in the Last Year: Never true    Ran Out of Food in the Last Year: Never true   Transportation Needs: No Transportation Needs    Lack of Transportation (Medical): No    Lack of Transportation (Non-Medical): No   Physical Activity: Sufficiently Active    Days of Exercise per Week: 6 days    Minutes of Exercise per Session: 100 min   Stress: No Stress Concern Present    Feeling of Stress : Not at all   Social Connections: Unknown    Frequency of Communication with Friends and Family: More than three times a week    Frequency of Social Gatherings with Friends and Family: More than three times a week    Active Member of Clubs or Organizations: No    Attends Club or Organization Meetings: Never    Marital Status:    Housing Stability: Low Risk     Unable to Pay for Housing in the Last Year: No    Number of Places Lived in the Last Year: 1    Unstable Housing in the Last Year: No

## 2022-10-20 ENCOUNTER — OFFICE VISIT (OUTPATIENT)
Dept: SLEEP MEDICINE | Facility: CLINIC | Age: 32
End: 2022-10-20
Payer: COMMERCIAL

## 2022-10-20 VITALS
BODY MASS INDEX: 36.08 KG/M2 | WEIGHT: 266 LBS | HEART RATE: 65 BPM | SYSTOLIC BLOOD PRESSURE: 131 MMHG | DIASTOLIC BLOOD PRESSURE: 79 MMHG

## 2022-10-20 DIAGNOSIS — R06.2 WHEEZING: ICD-10-CM

## 2022-10-20 DIAGNOSIS — R06.83 SNORING: ICD-10-CM

## 2022-10-20 PROCEDURE — 3075F SYST BP GE 130 - 139MM HG: CPT | Mod: CPTII,S$GLB,, | Performed by: NURSE PRACTITIONER

## 2022-10-20 PROCEDURE — 1159F MED LIST DOCD IN RCRD: CPT | Mod: CPTII,S$GLB,, | Performed by: NURSE PRACTITIONER

## 2022-10-20 PROCEDURE — 3078F DIAST BP <80 MM HG: CPT | Mod: CPTII,S$GLB,, | Performed by: NURSE PRACTITIONER

## 2022-10-20 PROCEDURE — 99203 PR OFFICE/OUTPT VISIT, NEW, LEVL III, 30-44 MIN: ICD-10-PCS | Mod: S$GLB,,, | Performed by: NURSE PRACTITIONER

## 2022-10-20 PROCEDURE — 3078F PR MOST RECENT DIASTOLIC BLOOD PRESSURE < 80 MM HG: ICD-10-PCS | Mod: CPTII,S$GLB,, | Performed by: NURSE PRACTITIONER

## 2022-10-20 PROCEDURE — 3075F PR MOST RECENT SYSTOLIC BLOOD PRESS GE 130-139MM HG: ICD-10-PCS | Mod: CPTII,S$GLB,, | Performed by: NURSE PRACTITIONER

## 2022-10-20 PROCEDURE — 99203 OFFICE O/P NEW LOW 30 MIN: CPT | Mod: S$GLB,,, | Performed by: NURSE PRACTITIONER

## 2022-10-20 PROCEDURE — 1159F PR MEDICATION LIST DOCUMENTED IN MEDICAL RECORD: ICD-10-PCS | Mod: CPTII,S$GLB,, | Performed by: NURSE PRACTITIONER

## 2022-10-20 PROCEDURE — 99999 PR PBB SHADOW E&M-EST. PATIENT-LVL III: ICD-10-PCS | Mod: PBBFAC,,, | Performed by: NURSE PRACTITIONER

## 2022-10-20 PROCEDURE — 99999 PR PBB SHADOW E&M-EST. PATIENT-LVL III: CPT | Mod: PBBFAC,,, | Performed by: NURSE PRACTITIONER

## 2022-10-20 NOTE — PROGRESS NOTES
Referred by AYALA Pollard  CHIEF COMPLAINT: Snoring  HISTORY OF PRESENT ILLNESS:He has never had a sleep study. Wife present providing some of the history. +loud disruptive snoring ongoing.  +witnessed apneic pauses. Sleep is consolidated mostly. Infrequent am headaches. Sinus congestion/drip/allergy symptoms better now than in past, takes singulair. Sleep is not refreshing all of the time. Denies oral drying. Sleeps with mouth open.     Denies symptoms of restless legs    On todays Hawkins Sleepiness Scale the patient scores a 11/24.     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  Warrants further investigation for untreated sleep apnea.     PLAN:   1. [facility] Sleep Study, discussed plan of care    2. Discussed etiology of TED and potential ramifications of untreated TED, including stroke, heart disease, HTN.  We discussed potential treatment options, which could include weight loss\, continuous positive airway pressure (CPAP-definitive), mandibular advancement splint by dentist, or referral for surgical consideration.   3. Avoid driving sleepy.  Trial chin strap

## 2022-11-01 ENCOUNTER — OFFICE VISIT (OUTPATIENT)
Dept: FAMILY MEDICINE | Facility: CLINIC | Age: 32
End: 2022-11-01
Payer: COMMERCIAL

## 2022-11-01 VITALS
HEART RATE: 68 BPM | OXYGEN SATURATION: 96 % | BODY MASS INDEX: 36.7 KG/M2 | SYSTOLIC BLOOD PRESSURE: 130 MMHG | TEMPERATURE: 98 F | DIASTOLIC BLOOD PRESSURE: 88 MMHG | WEIGHT: 270.94 LBS | HEIGHT: 72 IN

## 2022-11-01 DIAGNOSIS — Z13.0 SCREENING FOR DEFICIENCY ANEMIA: ICD-10-CM

## 2022-11-01 DIAGNOSIS — H90.6 MIXED CONDUCTIVE AND SENSORINEURAL HEARING LOSS OF BOTH EARS: ICD-10-CM

## 2022-11-01 DIAGNOSIS — Z13.220 SCREENING CHOLESTEROL LEVEL: ICD-10-CM

## 2022-11-01 DIAGNOSIS — J45.30 MILD PERSISTENT ASTHMA WITHOUT COMPLICATION: Primary | ICD-10-CM

## 2022-11-01 DIAGNOSIS — H91.91 DEAFNESS IN RIGHT EAR: ICD-10-CM

## 2022-11-01 DIAGNOSIS — F63.81 INTERMITTENT EXPLOSIVE DISORDER: ICD-10-CM

## 2022-11-01 DIAGNOSIS — F41.9 ANXIETY: ICD-10-CM

## 2022-11-01 DIAGNOSIS — Z13.1 DIABETES MELLITUS SCREENING: ICD-10-CM

## 2022-11-01 DIAGNOSIS — K76.0 FATTY LIVER: ICD-10-CM

## 2022-11-01 DIAGNOSIS — Z00.00 ENCOUNTER FOR BLOOD TEST FOR ROUTINE GENERAL PHYSICAL EXAMINATION: ICD-10-CM

## 2022-11-01 DIAGNOSIS — Z13.29 THYROID DISORDER SCREEN: ICD-10-CM

## 2022-11-01 PROCEDURE — 99214 PR OFFICE/OUTPT VISIT, EST, LEVL IV, 30-39 MIN: ICD-10-PCS | Mod: S$GLB,,, | Performed by: NURSE PRACTITIONER

## 2022-11-01 PROCEDURE — 3079F PR MOST RECENT DIASTOLIC BLOOD PRESSURE 80-89 MM HG: ICD-10-PCS | Mod: CPTII,S$GLB,, | Performed by: NURSE PRACTITIONER

## 2022-11-01 PROCEDURE — 3075F SYST BP GE 130 - 139MM HG: CPT | Mod: CPTII,S$GLB,, | Performed by: NURSE PRACTITIONER

## 2022-11-01 PROCEDURE — 3008F BODY MASS INDEX DOCD: CPT | Mod: CPTII,S$GLB,, | Performed by: NURSE PRACTITIONER

## 2022-11-01 PROCEDURE — 99999 PR PBB SHADOW E&M-EST. PATIENT-LVL IV: ICD-10-PCS | Mod: PBBFAC,,, | Performed by: NURSE PRACTITIONER

## 2022-11-01 PROCEDURE — 1160F RVW MEDS BY RX/DR IN RCRD: CPT | Mod: CPTII,S$GLB,, | Performed by: NURSE PRACTITIONER

## 2022-11-01 PROCEDURE — 99214 OFFICE O/P EST MOD 30 MIN: CPT | Mod: S$GLB,,, | Performed by: NURSE PRACTITIONER

## 2022-11-01 PROCEDURE — 3075F PR MOST RECENT SYSTOLIC BLOOD PRESS GE 130-139MM HG: ICD-10-PCS | Mod: CPTII,S$GLB,, | Performed by: NURSE PRACTITIONER

## 2022-11-01 PROCEDURE — 1160F PR REVIEW ALL MEDS BY PRESCRIBER/CLIN PHARMACIST DOCUMENTED: ICD-10-PCS | Mod: CPTII,S$GLB,, | Performed by: NURSE PRACTITIONER

## 2022-11-01 PROCEDURE — 99999 PR PBB SHADOW E&M-EST. PATIENT-LVL IV: CPT | Mod: PBBFAC,,, | Performed by: NURSE PRACTITIONER

## 2022-11-01 PROCEDURE — 3008F PR BODY MASS INDEX (BMI) DOCUMENTED: ICD-10-PCS | Mod: CPTII,S$GLB,, | Performed by: NURSE PRACTITIONER

## 2022-11-01 PROCEDURE — 1159F MED LIST DOCD IN RCRD: CPT | Mod: CPTII,S$GLB,, | Performed by: NURSE PRACTITIONER

## 2022-11-01 PROCEDURE — 1159F PR MEDICATION LIST DOCUMENTED IN MEDICAL RECORD: ICD-10-PCS | Mod: CPTII,S$GLB,, | Performed by: NURSE PRACTITIONER

## 2022-11-01 PROCEDURE — 3079F DIAST BP 80-89 MM HG: CPT | Mod: CPTII,S$GLB,, | Performed by: NURSE PRACTITIONER

## 2022-11-01 NOTE — PROGRESS NOTES
Subjective:       Patient ID: Marshal Baer is a 32 y.o. male.    Chief Complaint: Follow-up (6 weeks F/U)    Follow-up  Pertinent negatives include no abdominal pain, arthralgias, chest pain, congestion, coughing, fatigue, fever, headaches, joint swelling, myalgias, nausea, neck pain, rash, sore throat or vomiting.     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    Component      Latest Ref Rng & Units 10/5/2022   Interpretation       No obstruction on spirometry. Mild restriction on per lung volumes. DLCO is normal.   Pre FVC      4.36 - 6.37 L 4.49   Pre FEV1      3.61 - 5.28 L 3.17 (L)   Pre FEV1 FVC      % 70.45   Pre FEF 25 75      3.16 - 6.58 L/s 2.08 (L)   Pre PEF      8.01 - 11.99 L/s 8.47   Pre       sec 8.44   Pre DLCO      29.43 - 43.28 ml/(min*mmHg) 32.87   DLCO ADJ PRE      29.43 - 43.28 ml/(min*mmHg) 32.00   DLCOVA PRE      3.65 - 6.01 ml/(min*mmHg*L) 5.85   DLVAAdj PRE      3.65 - 6.01 ml/(min*mmHg*L) 5.70   VA PRE      7.38 - 7.38 L 5.62 (L)   IVC PRE      4.36 - 6.37 L 3.97 (L)   TLCN2 PRE      6.38 - 8.68 L 5.66 (L)   VCMAXN2 PRE      4.36 - 6.37 L 4.49   Pre FRC N2      2.49 - 4.46 L 1.86 (L)   ERVN2 PRE      -51858.39 - 86965.61 L 0.69   RVN2 PRE      1.20 - 2.54 L 1.17 (L)   [id number] PRE      17.46 - 35.42 % 20.65   FVC Ref       5.36   FVC LLN       4.36   FVC Pre Ref      % 83.8   FEV1 Ref       4.45   FEV1 LLN       3.61    FEV1 Pre Ref      % 71.2   FEF 25 75 Ref       4.87   FEF 25 75 LLN       3.16   FEF 25 75 Pre Ref      % 42.7   PEF Ref       10.00   PEF LLN       8.01   PEF Pre Ref      % 84.7   TLCN2 Ref       7.53   TLCN2 LLN       6.38   TLCN2 Pre Ref      % 75.2   VCMAXN2 Ref       5.36   VCMAXN2 LLN       4.36   VCMAXN2 Pre Ref      % 83.8   FRCN2 Ref       3.48   FRCN2 LLN       2.49   FRCN2 Pre Ref      % 53.6   ERVN2 Ref       1.61   ERVN2 LLN       -16776.39   ERVN2 Pre Ref      % 43.2   RVN2 Ref       1.87   RVN2 LLN       1.20   RVN2 Pre Ref      % 62.6   [id number] Ref       26.44   [id number] LLN       17.46   [id number] Pre Ref      % 78.1   DLCO Single Breath Ref       36.36   DLCO Single Breath LLN       29.43   DLCO Single Breath Pre Ref      % 90.4   DLCOc Single Breath Ref       36.36   DLCOc Single Breath LLN       29.43   DLCOc Single Breath Pre Ref      % 88.0   DLCOVA Ref       4.83   DLCOVA LLN       3.65   DLCOVA Pre Ref      % 121.3   DLCOc SBVA Ref       4.83   DLCOc SBVA LLN       3.65   DLCOc SBVA Pre Ref      % 118.1   VA Single Breath Ref       7.38   VA Single Breath LLN       7.38   VA Single Breath Pre Ref      % 76.1   IVC Single Breath Ref       5.36   IVC Single Breath LLN       4.36   IVC Single Breath Pre Ref      % 74.1     Anxiety and Intermittent Explosive Disorder  Controlled on Fluoxetine 20 mg daily    Mixed Conductive and Sensorineural hearing loss  Followed by ENT    BPH  Followed by [facility] NP Madhav    Small hiatal hernia with chronic GERD  Followed by [facility]    Fatty Liver  03/23/2022 CT abdomen pelvis:  Diffuse hepatic steatosis similar to prior exams.  7/10/2018 hepatitis C negative        Review of Systems   Constitutional:  Negative for activity change, appetite change, fatigue, fever and unexpected weight change.   HENT:  Negative for congestion, ear pain, mouth sores, nosebleeds, postnasal drip, rhinorrhea, sinus pressure, sneezing, sore throat, trouble  swallowing and voice change.    Eyes: Negative.    Respiratory:  Negative for cough, chest tightness and shortness of breath.    Cardiovascular:  Negative for chest pain, palpitations and leg swelling.   Gastrointestinal: Negative.  Negative for abdominal pain, blood in stool, constipation, diarrhea, nausea and vomiting.   Endocrine: Negative.    Genitourinary:  Negative for difficulty urinating, dysuria, flank pain, hematuria and urgency.   Musculoskeletal:  Negative for arthralgias, back pain, gait problem, joint swelling, myalgias and neck pain.   Skin:  Negative for color change, rash and wound.   Allergic/Immunologic: Negative for immunocompromised state.   Neurological:  Negative for dizziness, tremors, seizures, syncope, speech difficulty and headaches.   Hematological:  Negative for adenopathy. Does not bruise/bleed easily.   Psychiatric/Behavioral:  Negative for behavioral problems, dysphoric mood, sleep disturbance and suicidal ideas. The patient is not nervous/anxious.        Objective:     Vitals:    11/01/22 1521   BP: 130/88   BP Location: Right arm   Patient Position: Sitting   BP Method: Large (Manual)   Pulse: 68   Temp: 97.9 °F (36.6 °C)   TempSrc: Temporal   SpO2: 96%   Weight: 122.9 kg (270 lb 15.1 oz)   Height: 6' (1.829 m)          Physical Exam  Constitutional:       General: He is not in acute distress.     Appearance: Normal appearance. He is obese. He is not toxic-appearing or diaphoretic.      Comments: Body mass index is 36.04 kg/m².     HENT:      Head: Normocephalic and atraumatic.      Right Ear: Tympanic membrane, ear canal and external ear normal.      Left Ear: Tympanic membrane, ear canal and external ear normal.      Ears:      Comments: Hearing loss both ears     Nose: No congestion.      Mouth/Throat:      Pharynx: No oropharyngeal exudate or posterior oropharyngeal erythema.   Eyes:      General:         Right eye: No discharge.         Left eye: No discharge.      Extraocular  Movements: Extraocular movements intact.      Conjunctiva/sclera: Conjunctivae normal.   Cardiovascular:      Rate and Rhythm: Normal rate and regular rhythm.      Heart sounds: Normal heart sounds.   Pulmonary:      Effort: Pulmonary effort is normal. No respiratory distress.      Breath sounds: Normal breath sounds. No stridor. No wheezing or rales.   Abdominal:      General: There is no distension.   Musculoskeletal:         General: Normal range of motion.   Skin:     General: Skin is warm and dry.   Neurological:      Mental Status: He is alert and oriented to person, place, and time.   Psychiatric:         Mood and Affect: Mood normal.         Behavior: Behavior normal.         Thought Content: Thought content normal.         Judgment: Judgment normal.         Assessment:         ICD-10-CM ICD-9-CM   1. Mild persistent asthma without complication  J45.30 493.90   2. Anxiety  F41.9 300.00   3. Intermittent explosive disorder  F63.81 312.34   4. Fatty liver  K76.0 571.8   5. Mixed conductive and sensorineural hearing loss of both ears  H90.6 389.22   6. Deafness in right ear  H91.91 389.9   7. Encounter for blood test for routine general physical examination  Z00.00 V72.62   8. Screening for deficiency anemia  Z13.0 V78.1   9. Thyroid disorder screen  Z13.29 V77.0   10. Screening cholesterol level  Z13.220 V77.91   11. Diabetes mellitus screening  Z13.1 V77.1       Plan:       Mild persistent asthma without complication  Controlled on inhaler    Anxiety  Continue current medication(s).  Follow up in 6 months.      Intermittent explosive disorder  Continue current medication(s).  Follow up in 6 months.      Fatty liver  Work on diet    Mixed conductive and sensorineural hearing loss of both ears  Condition followed/treated by Specialist.  Please follow up with Specialist as advised.    Deafness in right ear  Condition followed/treated by Specialist.  Please follow up with Specialist as advised.    Encounter for  blood test for routine general physical examination  -     CBC Auto Differential; Future; Expected date: 11/01/2022  -     Comprehensive Metabolic Panel; Future; Expected date: 11/01/2022  -     Hemoglobin A1C; Future; Expected date: 11/01/2022  -     Lipid Panel; Future; Expected date: 11/01/2022  -     TSH; Future; Expected date: 11/01/2022    Screening for deficiency anemia  -     CBC Auto Differential; Future; Expected date: 11/01/2022    Thyroid disorder screen  -     TSH; Future; Expected date: 11/01/2022    Screening cholesterol level  -     Lipid Panel; Future; Expected date: 11/01/2022    Diabetes mellitus screening  -     Comprehensive Metabolic Panel; Future; Expected date: 11/01/2022  -     Hemoglobin A1C; Future; Expected date: 11/01/2022    Follow up in about 6 months (around 5/1/2023) for fasting labs and WELLNESS EXAM.     Patient's Medications   New Prescriptions    No medications on file   Previous Medications    ALBUTEROL (ACCUNEB) 0.63 MG/3 ML NEBU    Take 3 mLs (0.63 mg total) by nebulization every 6 (six) hours as needed (wheezing). Rescue    FLUOXETINE 20 MG CAPSULE    Take 1 capsule (20 mg total) by mouth once daily.    FLUTICASONE PROPION-SALMETEROL 115-21 MCG/DOSE (ADVAIR HFA) 115-21 MCG/ACTUATION HFAA INHALER    Inhale 2 puffs into the lungs every 12 (twelve) hours. Controller    MONTELUKAST (SINGULAIR) 10 MG TABLET    Take 1 tablet (10 mg total) by mouth every evening.    PANTOPRAZOLE (PROTONIX) 40 MG TABLET    Take 1 tablet (40 mg total) by mouth once daily.   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    Benign prostatic hyperplasia with urinary frequency 12/10/2020    Class 2 obesity due to excess calories without serious comorbidity with  body mass index (BMI) of 35.0 to 35.9 in adult 05/07/2020    - discussed recommendation for diet and cardiovascular exercise - counseling on lifestyle modifications for risk factor reduction    Deafness in right ear     since birth    Elevated LFTs 01/04/2021    Lab Results Component Value Date  ALT 44 04/08/2022  AST 34 04/08/2022  ALKPHOS 79 04/08/2022  BILITOT 0.4 04/08/2022  - 7/10/2018 hepatitis C negative - 3/23/22 CT abd/pelvis: fatty liver disease normal - likely related with diet and lifestyle - pt denies alcohol use - discussed recommendation for diet and cardiovascular exercise - counseling on lifestyle modifications for risk factor reduction -    Epididymal cyst 04/19/2021    Noted on u/s dated 8/17/2016.     Fatty liver 04/12/2022    - 03/23/2022 CT abdomen pelvis:  Diffuse hepatic steatosis similar to prior exams. - 7/10/2018 hepatitis C negative Lab Results Component Value Date  ALT 44 04/08/2022  AST 34 04/08/2022  ALKPHOS 79 04/08/2022  BILITOT 0.4 04/08/2022  - discussed recommendation for diet and cardiovascular exercise - counseling on lifestyle modifications for risk factor reduction    Gastroesophageal reflux disease without esophagitis 01/30/2020    - followed by gastroenterology - 08/07/2020 EGD:  Small hiatal hernia.  Negative H pylori.  No dysplasia.  No esophagitis    Hiatal hernia with gastroesophageal reflux disease and esophagitis     History of peptic ulcer 07/10/2020    Intermittent explosive disorder 01/31/2020    Varicocele 08/17/2016       Past Surgical History:   Procedure Laterality Date    COLONOSCOPY N/A 1/12/2021    Procedure: COLONOSCOPY;  Surgeon: Valentina Patton MD;  Location: [facility];  Service: Endoscopy;  Laterality: N/A;    ENDOSCOPY  11/2019    ESOPHAGOGASTRODUODENOSCOPY N/A 8/7/2020    Procedure: EGD (ESOPHAGOGASTRODUODENOSCOPY);  Surgeon: Valentina Patton MD;  Location: [facility];  Service: Endoscopy;  Laterality: N/A;    VASECTOMY  12/27/2019       Family  History   Problem Relation Age of Onset    Heart disease Mother         aortic calcifications    Hyperlipidemia Mother     Diabetes Mother     Cancer Mother         lung cancer 55; thyroid cancer 55, colon cancer 55, lesion on the liver    Hypertension Mother     Lung cancer Mother     Hyperlipidemia Father     Heart disease Father         MI - age 30 - stents placed; CHF    Hypertension Father     Diabetes Father     Alcohol abuse Father     Heart disease Paternal Uncle 42        Heart Attack in 1999    Cancer Maternal Grandmother         Breast  cancer 2006    No Known Problems Maternal Grandfather     Prostate cancer Neg Hx     Kidney disease Neg Hx        Social History     Socioeconomic History    Marital status:     Number of children: 2   Occupational History     Employer: [employer]    Occupation: chemical    Tobacco Use    Smoking status: Never    Smokeless tobacco: Never   Substance and Sexual Activity    Alcohol use: Yes     Comment: rarely    Drug use: No    Sexual activity: Yes     Partners: Female     Social Determinants of Health     Financial Resource Strain: Low Risk     Difficulty of Paying Living Expenses: Not hard at all   Food Insecurity: No Food Insecurity    Worried About Running Out of Food in the Last Year: Never true    Ran Out of Food in the Last Year: Never true   Transportation Needs: No Transportation Needs    Lack of Transportation (Medical): No    Lack of Transportation (Non-Medical): No   Physical Activity: Sufficiently Active    Days of Exercise per Week: 7 days    Minutes of Exercise per Session: 90 min   Stress: No Stress Concern Present    Feeling of Stress : Not at all   Social Connections: Unknown    Frequency of Communication with Friends and Family: More than three times a week    Frequency of Social Gatherings with Friends and Family: Once a week    Active Member of Clubs or Organizations: No    Attends Club or Organization Meetings: Never     Marital Status:    Housing Stability: Low Risk     Unable to Pay for Housing in the Last Year: No    Number of Places Lived in the Last Year: 1    Unstable Housing in the Last Year: No

## 2022-11-16 ENCOUNTER — PATIENT MESSAGE (OUTPATIENT)
Dept: SLEEP MEDICINE | Facility: CLINIC | Age: 32
End: 2022-11-16
Payer: COMMERCIAL

## 2022-11-18 ENCOUNTER — TELEPHONE (OUTPATIENT)
Dept: OTOLARYNGOLOGY | Facility: CLINIC | Age: 32
End: 2022-11-18
Payer: COMMERCIAL

## 2022-11-18 ENCOUNTER — PATIENT MESSAGE (OUTPATIENT)
Dept: SURGERY | Facility: HOSPITAL | Age: 32
End: 2022-11-18
Payer: COMMERCIAL

## 2022-11-18 NOTE — PRE-PROCEDURE INSTRUCTIONS
PreOp Instructions given:   - Verbal medication information (what to hold and what to take)   - NPO guidelines   - Arrival place directions given; time to be given the day before procedure by the   Surgeon's Office [id number]  - Bathing with antibacterial soap   - Don't wear any jewelry or bring any valuables AM of surgery   - No makeup or moisturizer to face   - No perfume/cologne, powder, lotions or aftershave   Pt. verbalized understanding.   Pt denies any h/o Anesthesia/Sedation complications or side effects.

## 2022-11-18 NOTE — TELEPHONE ENCOUNTER
Called patient to confirm surgery time and location. Time and location provided, patient ok with the instructions provided.

## 2022-11-21 ENCOUNTER — HOSPITAL ENCOUNTER (OUTPATIENT)
Facility: HOSPITAL | Age: 32
Discharge: HOME | End: 2022-11-21
Attending: OTOLARYNGOLOGY | Admitting: OTOLARYNGOLOGY
Payer: COMMERCIAL

## 2022-11-21 ENCOUNTER — ANESTHESIA (OUTPATIENT)
Dept: SURGERY | Facility: HOSPITAL | Age: 32
End: 2022-11-21
Payer: COMMERCIAL

## 2022-11-21 ENCOUNTER — ANESTHESIA EVENT (OUTPATIENT)
Dept: SURGERY | Facility: HOSPITAL | Age: 32
End: 2022-11-21
Payer: COMMERCIAL

## 2022-11-21 VITALS
DIASTOLIC BLOOD PRESSURE: 67 MMHG | OXYGEN SATURATION: 95 % | HEIGHT: 72 IN | HEART RATE: 64 BPM | BODY MASS INDEX: 35.89 KG/M2 | TEMPERATURE: 98 F | WEIGHT: 265 LBS | SYSTOLIC BLOOD PRESSURE: 125 MMHG | RESPIRATION RATE: 18 BRPM

## 2022-11-21 DIAGNOSIS — H90.2 CONDUCTIVE HEARING LOSS: Primary | ICD-10-CM

## 2022-11-21 PROCEDURE — 25000003 PHARM REV CODE 250: Performed by: STUDENT IN AN ORGANIZED HEALTH CARE EDUCATION/TRAINING PROGRAM

## 2022-11-21 PROCEDURE — L8690 AUD OSSEO DEV, INT/EXT COMP: HCPCS | Performed by: OTOLARYNGOLOGY

## 2022-11-21 PROCEDURE — 27201423 OPTIME MED/SURG SUP & DEVICES STERILE SUPPLY: Performed by: OTOLARYNGOLOGY

## 2022-11-21 PROCEDURE — 71000016 HC POSTOP RECOV ADDL HR: Performed by: OTOLARYNGOLOGY

## 2022-11-21 PROCEDURE — 63600175 PHARM REV CODE 636 W HCPCS: Performed by: ANESTHESIOLOGY

## 2022-11-21 PROCEDURE — 69716 IMPL OI IMPLT SK TC ESP<100: CPT | Mod: RT,,, | Performed by: OTOLARYNGOLOGY

## 2022-11-21 PROCEDURE — 25000003 PHARM REV CODE 250: Performed by: ANESTHESIOLOGY

## 2022-11-21 PROCEDURE — D9220A PRA ANESTHESIA: ICD-10-PCS | Mod: CRNA,,, | Performed by: STUDENT IN AN ORGANIZED HEALTH CARE EDUCATION/TRAINING PROGRAM

## 2022-11-21 PROCEDURE — 36000711: Performed by: OTOLARYNGOLOGY

## 2022-11-21 PROCEDURE — 63600175 PHARM REV CODE 636 W HCPCS: Performed by: STUDENT IN AN ORGANIZED HEALTH CARE EDUCATION/TRAINING PROGRAM

## 2022-11-21 PROCEDURE — 94761 N-INVAS EAR/PLS OXIMETRY MLT: CPT

## 2022-11-21 PROCEDURE — 37000009 HC ANESTHESIA EA ADD 15 MINS: Performed by: OTOLARYNGOLOGY

## 2022-11-21 PROCEDURE — D9220A PRA ANESTHESIA: ICD-10-PCS | Mod: ANES,,, | Performed by: ANESTHESIOLOGY

## 2022-11-21 PROCEDURE — D9220A PRA ANESTHESIA: Mod: ANES,,, | Performed by: ANESTHESIOLOGY

## 2022-11-21 PROCEDURE — 71000015 HC POSTOP RECOV 1ST HR: Performed by: OTOLARYNGOLOGY

## 2022-11-21 PROCEDURE — 36000710: Performed by: OTOLARYNGOLOGY

## 2022-11-21 PROCEDURE — 27000221 HC OXYGEN, UP TO 24 HOURS

## 2022-11-21 PROCEDURE — 69716 PR IMPLANT, OSSEOINTEGR IMPL, SKULL, W/MAG TRANSCUT ESP: ICD-10-PCS | Mod: RT,,, | Performed by: OTOLARYNGOLOGY

## 2022-11-21 PROCEDURE — 71000044 HC DOSC ROUTINE RECOVERY FIRST HOUR: Performed by: OTOLARYNGOLOGY

## 2022-11-21 PROCEDURE — C1713 ANCHOR/SCREW BN/BN,TIS/BN: HCPCS | Performed by: OTOLARYNGOLOGY

## 2022-11-21 PROCEDURE — 25000003 PHARM REV CODE 250: Performed by: OTOLARYNGOLOGY

## 2022-11-21 PROCEDURE — 37000008 HC ANESTHESIA 1ST 15 MINUTES: Performed by: OTOLARYNGOLOGY

## 2022-11-21 PROCEDURE — D9220A PRA ANESTHESIA: Mod: CRNA,,, | Performed by: STUDENT IN AN ORGANIZED HEALTH CARE EDUCATION/TRAINING PROGRAM

## 2022-11-21 DEVICE — SCREW COVER BAHA 4MM: Type: IMPLANTABLE DEVICE | Site: EAR | Status: FUNCTIONAL

## 2022-11-21 DEVICE — IMPLANTABLE DEVICE: Type: IMPLANTABLE DEVICE | Site: EAR | Status: FUNCTIONAL

## 2022-11-21 RX ORDER — FENTANYL CITRATE 50 UG/ML
INJECTION, SOLUTION INTRAMUSCULAR; INTRAVENOUS
Status: DISCONTINUED | OUTPATIENT
Start: 2022-11-21 | End: 2022-11-21

## 2022-11-21 RX ORDER — LIDOCAINE HYDROCHLORIDE 20 MG/ML
INJECTION, SOLUTION EPIDURAL; INFILTRATION; INTRACAUDAL; PERINEURAL
Status: DISCONTINUED | OUTPATIENT
Start: 2022-11-21 | End: 2022-11-21

## 2022-11-21 RX ORDER — DEXMEDETOMIDINE HYDROCHLORIDE 100 UG/ML
INJECTION, SOLUTION INTRAVENOUS
Status: DISCONTINUED | OUTPATIENT
Start: 2022-11-21 | End: 2022-11-21

## 2022-11-21 RX ORDER — ACETAMINOPHEN 10 MG/ML
INJECTION, SOLUTION INTRAVENOUS
Status: DISCONTINUED | OUTPATIENT
Start: 2022-11-21 | End: 2022-11-21

## 2022-11-21 RX ORDER — SODIUM CHLORIDE 0.9 % (FLUSH) 0.9 %
10 SYRINGE (ML) INJECTION
Status: DISCONTINUED | OUTPATIENT
Start: 2022-11-21 | End: 2022-11-21 | Stop reason: HOSPADM

## 2022-11-21 RX ORDER — HYDROMORPHONE HYDROCHLORIDE 1 MG/ML
0.2 INJECTION, SOLUTION INTRAMUSCULAR; INTRAVENOUS; SUBCUTANEOUS EVERY 5 MIN PRN
Status: DISCONTINUED | OUTPATIENT
Start: 2022-11-21 | End: 2022-11-21 | Stop reason: HOSPADM

## 2022-11-21 RX ORDER — CEPHALEXIN 500 MG/1
500 CAPSULE ORAL EVERY 8 HOURS
Qty: 21 CAPSULE | Refills: 0 | Status: SHIPPED | OUTPATIENT
Start: 2022-11-21 | End: 2022-11-28

## 2022-11-21 RX ORDER — CEFAZOLIN SODIUM/WATER 2 G/20 ML
2 SYRINGE (ML) INTRAVENOUS
Status: COMPLETED | OUTPATIENT
Start: 2022-11-21 | End: 2022-11-21

## 2022-11-21 RX ORDER — OXYCODONE HYDROCHLORIDE 5 MG/1
5 TABLET ORAL
Status: DISCONTINUED | OUTPATIENT
Start: 2022-11-21 | End: 2022-11-21 | Stop reason: HOSPADM

## 2022-11-21 RX ORDER — LIDOCAINE HYDROCHLORIDE 10 MG/ML
1 INJECTION, SOLUTION EPIDURAL; INFILTRATION; INTRACAUDAL; PERINEURAL ONCE
Status: DISCONTINUED | OUTPATIENT
Start: 2022-11-21 | End: 2022-11-21 | Stop reason: HOSPADM

## 2022-11-21 RX ORDER — HYDROMORPHONE HYDROCHLORIDE 1 MG/ML
INJECTION, SOLUTION INTRAMUSCULAR; INTRAVENOUS; SUBCUTANEOUS
Status: DISCONTINUED | OUTPATIENT
Start: 2022-11-21 | End: 2022-11-21

## 2022-11-21 RX ORDER — NEOSTIGMINE METHYLSULFATE 0.5 MG/ML
INJECTION, SOLUTION INTRAVENOUS
Status: DISCONTINUED | OUTPATIENT
Start: 2022-11-21 | End: 2022-11-21

## 2022-11-21 RX ORDER — HYDROCODONE BITARTRATE AND ACETAMINOPHEN 5; 325 MG/1; MG/1
1 TABLET ORAL EVERY 6 HOURS PRN
Status: DISCONTINUED | OUTPATIENT
Start: 2022-11-21 | End: 2022-11-21 | Stop reason: HOSPADM

## 2022-11-21 RX ORDER — DEXAMETHASONE SODIUM PHOSPHATE 4 MG/ML
INJECTION, SOLUTION INTRA-ARTICULAR; INTRALESIONAL; INTRAMUSCULAR; INTRAVENOUS; SOFT TISSUE
Status: DISCONTINUED | OUTPATIENT
Start: 2022-11-21 | End: 2022-11-21

## 2022-11-21 RX ORDER — LIDOCAINE HYDROCHLORIDE AND EPINEPHRINE 10; 10 MG/ML; UG/ML
INJECTION, SOLUTION INFILTRATION; PERINEURAL
Status: DISCONTINUED | OUTPATIENT
Start: 2022-11-21 | End: 2022-11-21 | Stop reason: HOSPADM

## 2022-11-21 RX ORDER — PHENYLEPHRINE HCL IN 0.9% NACL 1 MG/10 ML
SYRINGE (ML) INTRAVENOUS
Status: DISCONTINUED | OUTPATIENT
Start: 2022-11-21 | End: 2022-11-21

## 2022-11-21 RX ORDER — PROPOFOL 10 MG/ML
VIAL (ML) INTRAVENOUS
Status: DISCONTINUED | OUTPATIENT
Start: 2022-11-21 | End: 2022-11-21

## 2022-11-21 RX ORDER — ROCURONIUM BROMIDE 10 MG/ML
INJECTION, SOLUTION INTRAVENOUS
Status: DISCONTINUED | OUTPATIENT
Start: 2022-11-21 | End: 2022-11-21

## 2022-11-21 RX ORDER — SODIUM CHLORIDE 0.9 % (FLUSH) 0.9 %
3 SYRINGE (ML) INJECTION
Status: DISCONTINUED | OUTPATIENT
Start: 2022-11-21 | End: 2022-11-21 | Stop reason: HOSPADM

## 2022-11-21 RX ORDER — ONDANSETRON 4 MG/1
4 TABLET, ORALLY DISINTEGRATING ORAL EVERY 6 HOURS PRN
Qty: 20 TABLET | Refills: 0 | Status: SHIPPED | OUTPATIENT
Start: 2022-11-21 | End: 2022-12-27

## 2022-11-21 RX ORDER — HYDROCODONE BITARTRATE AND ACETAMINOPHEN 5; 325 MG/1; MG/1
1 TABLET ORAL EVERY 6 HOURS PRN
Qty: 24 TABLET | Refills: 0 | Status: SHIPPED | OUTPATIENT
Start: 2022-11-21 | End: 2022-12-27

## 2022-11-21 RX ORDER — METHYLENE BLUE 10 MG/ML
INJECTION INTRAVENOUS
Status: DISCONTINUED | OUTPATIENT
Start: 2022-11-21 | End: 2022-11-21 | Stop reason: HOSPADM

## 2022-11-21 RX ORDER — ONDANSETRON 2 MG/ML
INJECTION INTRAMUSCULAR; INTRAVENOUS
Status: DISCONTINUED | OUTPATIENT
Start: 2022-11-21 | End: 2022-11-21

## 2022-11-21 RX ORDER — PROCHLORPERAZINE EDISYLATE 5 MG/ML
5 INJECTION INTRAMUSCULAR; INTRAVENOUS EVERY 30 MIN PRN
Status: DISCONTINUED | OUTPATIENT
Start: 2022-11-21 | End: 2022-11-21 | Stop reason: HOSPADM

## 2022-11-21 RX ORDER — MIDAZOLAM HYDROCHLORIDE 5 MG/ML
INJECTION INTRAMUSCULAR; INTRAVENOUS
Status: DISCONTINUED | OUTPATIENT
Start: 2022-11-21 | End: 2022-11-21

## 2022-11-21 RX ADMIN — OXYCODONE 5 MG: 5 TABLET ORAL at 10:11

## 2022-11-21 RX ADMIN — DEXAMETHASONE SODIUM PHOSPHATE 4 MG: 4 INJECTION INTRA-ARTICULAR; INTRALESIONAL; INTRAMUSCULAR; INTRAVENOUS; SOFT TISSUE at 07:11

## 2022-11-21 RX ADMIN — Medication 100 MCG: at 07:11

## 2022-11-21 RX ADMIN — Medication 3 G: at 07:11

## 2022-11-21 RX ADMIN — HYDROMORPHONE HYDROCHLORIDE 0.4 MG: 1 INJECTION, SOLUTION INTRAMUSCULAR; INTRAVENOUS; SUBCUTANEOUS at 08:11

## 2022-11-21 RX ADMIN — FENTANYL CITRATE 25 MCG: 50 INJECTION, SOLUTION INTRAMUSCULAR; INTRAVENOUS at 07:11

## 2022-11-21 RX ADMIN — DEXMEDETOMIDINE HYDROCHLORIDE 8 MCG: 100 INJECTION, SOLUTION INTRAVENOUS at 08:11

## 2022-11-21 RX ADMIN — HYDROMORPHONE HYDROCHLORIDE 0.2 MG: 1 INJECTION, SOLUTION INTRAMUSCULAR; INTRAVENOUS; SUBCUTANEOUS at 10:11

## 2022-11-21 RX ADMIN — GLYCOPYRROLATE 0.4 MG: 0.2 INJECTION INTRAMUSCULAR; INTRAVENOUS at 08:11

## 2022-11-21 RX ADMIN — ACETAMINOPHEN 1000 MG: 10 INJECTION INTRAVENOUS at 08:11

## 2022-11-21 RX ADMIN — MIDAZOLAM HYDROCHLORIDE 2 MG: 5 INJECTION, SOLUTION INTRAMUSCULAR; INTRAVENOUS at 07:11

## 2022-11-21 RX ADMIN — NEOSTIGMINE METHYLSULFATE 3 MG: 0.5 INJECTION, SOLUTION INTRAVENOUS at 08:11

## 2022-11-21 RX ADMIN — PROPOFOL 200 MG: 10 INJECTION, EMULSION INTRAVENOUS at 07:11

## 2022-11-21 RX ADMIN — SODIUM CHLORIDE: 9 INJECTION, SOLUTION INTRAVENOUS at 06:11

## 2022-11-21 RX ADMIN — ROCURONIUM BROMIDE 50 MG: 10 INJECTION INTRAVENOUS at 07:11

## 2022-11-21 RX ADMIN — LIDOCAINE HYDROCHLORIDE 100 MG: 20 INJECTION, SOLUTION EPIDURAL; INFILTRATION; INTRACAUDAL; PERINEURAL at 07:11

## 2022-11-21 RX ADMIN — ONDANSETRON 4 MG: 2 INJECTION INTRAMUSCULAR; INTRAVENOUS at 08:11

## 2022-11-21 RX ADMIN — FENTANYL CITRATE 75 MCG: 50 INJECTION, SOLUTION INTRAMUSCULAR; INTRAVENOUS at 07:11

## 2022-11-21 NOTE — ANESTHESIA PROCEDURE NOTES
Intubation    Date/Time: 11/21/2022 7:10 AM  Performed by: Charlene Bolivar CRNA  Authorized by: Jose Kim MD     Intubation:     Induction:  Intravenous    Intubated:  Postinduction    Mask Ventilation:  Easy with oral airway    Attempts:  1    Attempted By:  CRNA    Method of Intubation:  Video laryngoscopy    Blade:  Thayer 3    Laryngeal View Grade: Grade I - full view of cords      Difficult Airway Encountered?: No      Complications:  None    Airway Device:  Oral endotracheal tube    Airway Device Size:  7.5    Style/Cuff Inflation:  Cuffed (inflated to minimal occlusive pressure)    Inflation Amount (mL):  7    Tube secured:  24    Secured at:  The lips    Placement Verified By:  Capnometry    Complicating Factors:  None    Findings Post-Intubation:  BS equal bilateral and atraumatic/condition of teeth unchanged

## 2022-11-21 NOTE — ANESTHESIA PREPROCEDURE EVALUATION
11/21/2022  Marshal Baer is a 32 y.o., male.    Patient Active Problem List   Diagnosis    Anxiety    Gastroesophageal reflux disease without esophagitis    Chronic rhinitis    Intermittent explosive disorder    Class 2 obesity due to excess calories without serious comorbidity with body mass index (BMI) of 35.0 to 35.9 in adult    History of peptic ulcer    Benign prostatic hyperplasia with urinary frequency    Urinary frequency    Elevated LFTs    Epididymal cyst    Fatty liver    Mixed conductive and sensorineural hearing loss of both ears    Functional dyspepsia    Dermatitis    Left foot pain    Hiatal hernia with gastroesophageal reflux disease and esophagitis    Mild persistent asthma without complication    Deafness in right ear           Pre-op Assessment    I have reviewed the Patient Summary Reports.     I have reviewed the Nursing Notes. I have reviewed the NPO Status.   I have reviewed the Medications.     Review of Systems  Social:  Non-Smoker    Neurological:   Neuromuscular Disease,        Physical Exam  General: Well nourished    Airway:  Mallampati: I / I  Mouth Opening: Normal  TM Distance: Normal  Tongue: Normal  Neck ROM: Normal ROM    Dental:  Intact        Anesthesia Plan  Type of Anesthesia, risks & benefits discussed:    Anesthesia Type: Gen ETT  Intra-op Monitoring Plan: Standard ASA Monitors  Post Op Pain Control Plan: multimodal analgesia  Induction:  IV  Airway Plan: Direct, Post-Induction  Informed Consent: Informed consent signed with the Patient and all parties understand the risks and agree with anesthesia plan.  All questions answered. Patient consented to blood products? Yes  ASA Score: 2  Day of Surgery Review of History & Physical: H&P Update referred to the surgeon/provider.    Ready For Surgery From Anesthesia Perspective.     .    Lab  Results   Component Value Date    WBC 7.45 04/08/2022    HGB 15.6 04/08/2022    HCT 47.3 04/08/2022    MCV 86 04/08/2022     04/08/2022       BMP  Lab Results   Component Value Date     04/08/2022    K 4.1 04/08/2022     04/08/2022    CO2 28 04/08/2022    BUN 16 04/08/2022    CREATININE 0.87 04/08/2022    CALCIUM 8.6 (L) 04/08/2022    ANIONGAP 8 04/08/2022

## 2022-11-21 NOTE — OP NOTE
[facility] - Surgery ([facility])  Surgery Department  Operative Note    SUMMARY     Date of Procedure: 11/21/2022     Procedure: Procedure(s) (LRB):  INSERTION, BAHA (Right)     Surgeon(s) and Role:     * Edu Mistry MD - Primary     * Michael Kern MD - Resident - Assisting        Pre-Operative Diagnosis: Mixed conductive and sensorineural hearing loss of right ear with restricted hearing of left ear [H90.A31]  Sensorineural hearing loss (SNHL) of left ear with restricted hearing of right ear [H90.A22]    Post-Operative Diagnosis: Post-Op Diagnosis Codes:     * Mixed conductive and sensorineural hearing loss of right ear with restricted hearing of left ear [H90.A31]     * Sensorineural hearing loss (SNHL) of left ear with restricted hearing of right ear [H90.A22]    Anesthesia: General    Operative Findings (including complications, if any):   4mm implant placed   Magnet placed superficial to temporalis     Description of Technical Procedures:   Patient was brought to the operating room and intubated by the anesthesia team.  The bed was turned 180 degrees the  right side was prepped by shaving the post auricular area.  The outline  of the device was drawn using a marking pen.  It was placed approximately 1-2 cm behind the auricle in line with the external acoustic meatus.  The location of the implant was marked using methylene blue.  A linear incision was then marked about a cm away from the planned location of the device.       The incision was made with a 15 blade down to the a layer superficial to the periosteum and temoralis fascia.  A soft tissue flap was elevated posteriorly and our methylene blue dot was located.  A pocket above the temporalis fascia was created to fit the template.  A cruciate periosteal incision was made at the marked spot and the periosteum was elevated.      Drilling commenced at 20,000 rpm using the 4mm drill with drill guard in place.  After drilling to 3mm the hole was checked for  dural exposure.  Having confirmed no dural exposure, the guard was removed and the hole was drilled to 4mm.  The 4mm counter-sink drill was then used to widen the hole and create a counter sink.  The drill settings were then changed to 40 rpm and the implant was attached to the drill. wihout using irrigation for the first 280 degrees of insertion the implant was placed perpendicular to the skull using the drill, until it locked into place.     The bone indicator was used to measure the flatness of the surrounding bone.  Additional drilling  Was not  needed to flatten the area.  The implant was then placed into its periosteal pocket and the actuator was the screwed into the implant and tightened to 25 newtons.  The periosteum and skin flap were closed in layers        Estimated Blood Loss (EBL):  30ml           Implants:   Implant Name Type Inv. Item Serial No.  Lot No. LRB No. Used Action   SCREW COVER BAHA 4MM - [id number]  SCREW COVER BAHA 4MM  COCHLEAR MARTINEZ [id number] Right 1 Implanted   IMPLANT OSIA [id number] - [id number]  IMPLANT OSIA [id number] COCHLEAR MARTINEZ  Right 1 Implanted       Specimens:   Specimen (24h ago, onward)      None                    Condition: Good    Disposition: PACU - hemodynamically stable.    Attestation: I was present and scrubbed for the entire procedure.

## 2022-11-21 NOTE — H&P
Subjective:       Patient ID: Marshal Baer is a 32 y.o. male.     Chief Complaint: Hearing Loss     HPI      Marshal Baer is a 32 y.o. male with history of profound left sided hearing loss, likely congenital.  Presents for evaluation of hearing loss.  Reports difficulty hearing in noise and hearing his family.  Denies vertigo or tinnitus. Has had a MRI in the past and was told there was nothing that can bed done for his hearing loss.        Review of Systems   Constitutional: Negative.    Eyes: Negative.    Cardiovascular: Negative.    Endocrine: Negative.    Genitourinary: Negative.    Musculoskeletal: Negative.    Integumentary:  Negative.   Neurological: Negative.    Hematological: Negative.    Psychiatric/Behavioral: Negative.           Objective:   Physical Exam  Vitals and nursing note reviewed.   Constitutional:       Appearance: Normal appearance.   HENT:      Head: Normocephalic and atraumatic.      Right Ear: Tympanic membrane, ear canal and external ear normal. There is no impacted cerumen.      Left Ear: Tympanic membrane, ear canal and external ear normal. There is no impacted cerumen.   Neurological:      Mental Status: He is alert.           Data Reviewed:        Data Reviewed: shows profound loss of AD with normal hearing AS  Aside from a mild HF SNHL     Assessment:       Problem List Items Addressed This Visit    None              Visit Diagnoses      Sensorineural hearing loss (SNHL) of left ear with restricted hearing of right ear    -  Primary     Deafness in left ear               Plan:       We discussed the options of surgery, traditional hearing aids, CROS, BiCROS or bone anchored hearing aids.  We discussed that in cases of asymmetrical sensorineural hearing loss, the BAHA transfers the sound through the skull from the ear with hearing loss to the better ear.  In this case, there is little improvement in sound localization, but there should be a dramatic improvement in  the head shadow effect.  In cases of conductive hearing loss, the BAHA is primarily to bypass the break in conduction to deliver sound to the ear that actually has the hearing loss.  In this case, there can not only be an improvement in the head shadow effect but also in sound localization.  Also, these patients may be candidates for magnetic attachments rather than an abutment.  Marshal has not yet undergone BAHA simulation.  After a lengthy discussion, Marshal would like to pursue A BC implant.     Right ear placement     The diagnosis, details of the surgery, postoperative care, and options of treatment were discussed at length. The risks of surgery were discussed including but not limited to local wound breakdown, permanent deafness/hearing loss, disabling dizziness, infection, bleeding, recurrence, need for additional surgery, and cardiopulmonary complications from anesthesia. I answered the patient's questions to satisfaction.

## 2022-11-21 NOTE — BRIEF OP NOTE
[facility] - Surgery (2nd Fl)  Brief Operative Note    Surgery Date: 11/21/2022     Surgeon(s) and Role:     * Edu Mistry MD - Primary     * Michael Kern MD - Resident - Assisting    Pre-op Diagnosis:  Mixed conductive and sensorineural hearing loss of right ear with restricted hearing of left ear [H90.A31]  Sensorineural hearing loss (SNHL) of left ear with restricted hearing of right ear [H90.A22]    Post-op Diagnosis:  Post-Op Diagnosis Codes:     * Mixed conductive and sensorineural hearing loss of right ear with restricted hearing of left ear [H90.A31]     * Sensorineural hearing loss (SNHL) of left ear with restricted hearing of right ear [H90.A22]    Procedure(s) (LRB):  INSERTION, BAHA (Right)    Anesthesia: General    Operative Findings: see operative dictation    Estimated Blood Loss: * No values recorded between 11/21/2022  7:41 AM and 11/21/2022  8:56 AM *         Specimens:   Specimen (24h ago, onward)      None              Discharge Note    OUTCOME: Patient tolerated treatment/procedure well without complication and is now ready for discharge.    DISPOSITION: Home or Self Care    FINAL DIAGNOSIS: Hearing Loss    FOLLOWUP: In clinic    DISCHARGE INSTRUCTIONS:    Discharge Procedure Orders   Diet Adult Regular     Lifting restrictions   Order Comments: No heavy lifting or straining.     Notify your health care provider if you experience any of the following:  difficulty breathing or increased cough     Notify your health care provider if you experience any of the following:  severe uncontrolled pain     Notify your health care provider if you experience any of the following:  persistent nausea and vomiting or diarrhea     Notify your health care provider if you experience any of the following:  temperature >100.4     Notify your health care provider if you experience any of the following:  redness, tenderness, or signs of infection (pain, swelling, redness, odor or green/yellow discharge around  incision site)     Remove dressing in 24 hours   Order Comments: Can continue to wear for comfort

## 2022-11-21 NOTE — PLAN OF CARE
Discharge instructions discussed with patient. Pt verbalizes understanding. Consents in chart, vital signs stable.

## 2022-11-21 NOTE — ANESTHESIA POSTPROCEDURE EVALUATION
Anesthesia Post Evaluation    Patient: Marshal Baer    Procedure(s) Performed: Procedure(s) (LRB):  INSERTION, BAHA (Right)    Final Anesthesia Type: general      Patient location during evaluation: PACU  Patient participation: Yes- Able to Participate  Level of consciousness: awake and alert and awake  Post-procedure vital signs: reviewed and stable  Pain management: adequate  Airway patency: patent    PONV status at discharge: No PONV  Anesthetic complications: no      Cardiovascular status: blood pressure returned to baseline  Respiratory status: unassisted and spontaneous ventilation  Hydration status: euvolemic  Follow-up not needed.          Vitals Value Taken Time   /67 11/21/22 1101   Temp 36.7 °C (98 °F) 11/21/22 1100   Pulse 65 11/21/22 1102   Resp 17 11/21/22 1102   SpO2 94 % 11/21/22 1102   Vitals shown include unvalidated device data.      No case tracking events are documented in the log.      Pain/Stephanie Score: Pain Rating Prior to Med Admin: 4 (11/21/2022 10:19 AM)  Stephanie Score: 10 (11/21/2022  9:47 AM)

## 2022-11-21 NOTE — TRANSFER OF CARE
Anesthesia Transfer of Care Note    Patient: Marshal Baer    Procedure(s) Performed: Procedure(s) (LRB):  INSERTION, BAHA (Right)    Patient location: [facility]    Anesthesia Type: general    Transport from OR: Transported from OR on 6-10 L/min O2 by face mask with adequate spontaneous ventilation    Post pain: adequate analgesia    Post assessment: no apparent anesthetic complications and tolerated procedure well    Post vital signs: stable    Level of consciousness: responds to stimulation and sedated    Nausea/Vomiting: no nausea/vomiting    Complications: none    Transfer of care protocol was followed      Last vitals:   Visit Vitals  BP (!) 109/55 (BP Location: Right arm, Patient Position: Lying)   Pulse 76   Temp 36.6 °C (97.9 °F) (Oral)   Resp 20   Ht 6' (1.829 m)   Wt 120.2 kg (265 lb)   SpO2 96%   BMI 35.94 kg/m²

## 2022-11-24 ENCOUNTER — PATIENT MESSAGE (OUTPATIENT)
Dept: OTOLARYNGOLOGY | Facility: CLINIC | Age: 32
End: 2022-11-24
Payer: COMMERCIAL

## 2022-11-29 ENCOUNTER — OFFICE VISIT (OUTPATIENT)
Dept: OTOLARYNGOLOGY | Facility: CLINIC | Age: 32
End: 2022-11-29
Payer: COMMERCIAL

## 2022-11-29 VITALS — WEIGHT: 265 LBS | BODY MASS INDEX: 35.94 KG/M2

## 2022-11-29 DIAGNOSIS — Z09 POSTOPERATIVE EXAMINATION: Primary | ICD-10-CM

## 2022-11-29 PROCEDURE — 99999 PR PBB SHADOW E&M-EST. PATIENT-LVL III: CPT | Mod: PBBFAC,,, | Performed by: OTOLARYNGOLOGY

## 2022-11-29 PROCEDURE — 99999 PR PBB SHADOW E&M-EST. PATIENT-LVL III: ICD-10-PCS | Mod: PBBFAC,,, | Performed by: OTOLARYNGOLOGY

## 2022-11-29 PROCEDURE — 3008F PR BODY MASS INDEX (BMI) DOCUMENTED: ICD-10-PCS | Mod: CPTII,S$GLB,, | Performed by: OTOLARYNGOLOGY

## 2022-11-29 PROCEDURE — 3008F BODY MASS INDEX DOCD: CPT | Mod: CPTII,S$GLB,, | Performed by: OTOLARYNGOLOGY

## 2022-11-29 PROCEDURE — 1159F MED LIST DOCD IN RCRD: CPT | Mod: CPTII,S$GLB,, | Performed by: OTOLARYNGOLOGY

## 2022-11-29 PROCEDURE — 1159F PR MEDICATION LIST DOCUMENTED IN MEDICAL RECORD: ICD-10-PCS | Mod: CPTII,S$GLB,, | Performed by: OTOLARYNGOLOGY

## 2022-11-29 PROCEDURE — 99024 PR POST-OP FOLLOW-UP VISIT: ICD-10-PCS | Mod: S$GLB,,, | Performed by: OTOLARYNGOLOGY

## 2022-11-29 PROCEDURE — 99024 POSTOP FOLLOW-UP VISIT: CPT | Mod: S$GLB,,, | Performed by: OTOLARYNGOLOGY

## 2022-11-30 ENCOUNTER — PATIENT MESSAGE (OUTPATIENT)
Dept: AUDIOLOGY | Facility: CLINIC | Age: 32
End: 2022-11-30
Payer: COMMERCIAL

## 2022-11-30 DIAGNOSIS — J45.40 MODERATE PERSISTENT ASTHMA WITHOUT COMPLICATION: ICD-10-CM

## 2022-11-30 RX ORDER — FLUTICASONE PROPIONATE AND SALMETEROL XINAFOATE 115; 21 UG/1; UG/1
2 AEROSOL, METERED RESPIRATORY (INHALATION) EVERY 12 HOURS
Qty: 12 G | Refills: 5 | Status: SHIPPED | OUTPATIENT
Start: 2022-11-30 | End: 2023-10-05

## 2022-12-20 ENCOUNTER — PATIENT MESSAGE (OUTPATIENT)
Dept: FAMILY MEDICINE | Facility: CLINIC | Age: 32
End: 2022-12-20
Payer: COMMERCIAL

## 2022-12-20 NOTE — TELEPHONE ENCOUNTER
No I am not here on Friday - I am leaving at 3 PM. You can put him on Tuesday 12/27 at 7 AM or 2:30 pm slot or in January    AYALA Diaz

## 2022-12-21 ENCOUNTER — TELEPHONE (OUTPATIENT)
Dept: AUDIOLOGY | Facility: CLINIC | Age: 32
End: 2022-12-21
Payer: COMMERCIAL

## 2022-12-21 ENCOUNTER — CLINICAL SUPPORT (OUTPATIENT)
Dept: AUDIOLOGY | Facility: CLINIC | Age: 32
End: 2022-12-21
Payer: COMMERCIAL

## 2022-12-21 ENCOUNTER — PATIENT MESSAGE (OUTPATIENT)
Dept: AUDIOLOGY | Facility: CLINIC | Age: 32
End: 2022-12-21

## 2022-12-21 DIAGNOSIS — H90.6 MIXED CONDUCTIVE AND SENSORINEURAL HEARING LOSS OF BOTH EARS: Primary | ICD-10-CM

## 2022-12-21 PROCEDURE — 99499 UNLISTED E&M SERVICE: CPT | Mod: S$GLB,,, | Performed by: AUDIOLOGIST

## 2022-12-21 PROCEDURE — 99499 NO LOS: ICD-10-PCS | Mod: S$GLB,,, | Performed by: AUDIOLOGIST

## 2022-12-21 NOTE — TELEPHONE ENCOUNTER
Spoke to patient.  He stated that his new processor is losing its bluetooth connection after a few minutes.  He was instructed to unpair, restart and repair the processors.  If the issue persists he was referred to [employer] for troubleshooting with their Recipient .

## 2022-12-22 NOTE — PROGRESS NOTES
Marshal Baer, a 32 y.o. male, was seen today for an Osia fitting.  Patients external processor was connected to the programming software, BC direct was completed, feedback and calibration were run.  The Osia was programmed and the external processor was activated and fit on his right ear.   Proper care and maintenance was discussed.  Pt successfully practiced attaching the device.  Mini Microphone was paired and confirmed working properly.  The Osia quin was downloaded and pt set up account and connected her device for the processor.  Bluetooth connection was also established.  Pt was counseled about adjusting to sound and hearing binaurally.  Pt will follow up as needed.       Bone Conduction Hearing Aid:  : Cochlear  Model: Osia   Side: Right  SN: [id number]  Backup SN: [id number]  Accessory: Mini Microphone      Recommendations:  Daily use of Osia  Follow up in one month  Contact office if programming issues arise  Contact [facility] if device issues arise

## 2022-12-27 ENCOUNTER — PATIENT MESSAGE (OUTPATIENT)
Dept: SLEEP MEDICINE | Facility: CLINIC | Age: 32
End: 2022-12-27
Payer: COMMERCIAL

## 2022-12-27 ENCOUNTER — OFFICE VISIT (OUTPATIENT)
Dept: FAMILY MEDICINE | Facility: CLINIC | Age: 32
End: 2022-12-27
Payer: COMMERCIAL

## 2022-12-27 VITALS
DIASTOLIC BLOOD PRESSURE: 86 MMHG | HEIGHT: 72 IN | HEART RATE: 68 BPM | OXYGEN SATURATION: 97 % | BODY MASS INDEX: 37.83 KG/M2 | TEMPERATURE: 98 F | WEIGHT: 279.31 LBS | SYSTOLIC BLOOD PRESSURE: 124 MMHG

## 2022-12-27 DIAGNOSIS — Z96.21 STATUS POST PLACEMENT OF BONE ANCHORED HEARING AID (BAHA): ICD-10-CM

## 2022-12-27 DIAGNOSIS — G47.33 OSA (OBSTRUCTIVE SLEEP APNEA): Primary | ICD-10-CM

## 2022-12-27 DIAGNOSIS — F41.9 ANXIETY: Primary | ICD-10-CM

## 2022-12-27 PROCEDURE — 3074F SYST BP LT 130 MM HG: CPT | Mod: CPTII,S$GLB,, | Performed by: NURSE PRACTITIONER

## 2022-12-27 PROCEDURE — 1160F PR REVIEW ALL MEDS BY PRESCRIBER/CLIN PHARMACIST DOCUMENTED: ICD-10-PCS | Mod: CPTII,S$GLB,, | Performed by: NURSE PRACTITIONER

## 2022-12-27 PROCEDURE — 1160F RVW MEDS BY RX/DR IN RCRD: CPT | Mod: CPTII,S$GLB,, | Performed by: NURSE PRACTITIONER

## 2022-12-27 PROCEDURE — 1159F MED LIST DOCD IN RCRD: CPT | Mod: CPTII,S$GLB,, | Performed by: NURSE PRACTITIONER

## 2022-12-27 PROCEDURE — 1159F PR MEDICATION LIST DOCUMENTED IN MEDICAL RECORD: ICD-10-PCS | Mod: CPTII,S$GLB,, | Performed by: NURSE PRACTITIONER

## 2022-12-27 PROCEDURE — 99999 PR PBB SHADOW E&M-EST. PATIENT-LVL IV: CPT | Mod: PBBFAC,,, | Performed by: NURSE PRACTITIONER

## 2022-12-27 PROCEDURE — 3008F BODY MASS INDEX DOCD: CPT | Mod: CPTII,S$GLB,, | Performed by: NURSE PRACTITIONER

## 2022-12-27 PROCEDURE — 3079F DIAST BP 80-89 MM HG: CPT | Mod: CPTII,S$GLB,, | Performed by: NURSE PRACTITIONER

## 2022-12-27 PROCEDURE — 99214 PR OFFICE/OUTPT VISIT, EST, LEVL IV, 30-39 MIN: ICD-10-PCS | Mod: S$GLB,,, | Performed by: NURSE PRACTITIONER

## 2022-12-27 PROCEDURE — 3074F PR MOST RECENT SYSTOLIC BLOOD PRESSURE < 130 MM HG: ICD-10-PCS | Mod: CPTII,S$GLB,, | Performed by: NURSE PRACTITIONER

## 2022-12-27 PROCEDURE — 3079F PR MOST RECENT DIASTOLIC BLOOD PRESSURE 80-89 MM HG: ICD-10-PCS | Mod: CPTII,S$GLB,, | Performed by: NURSE PRACTITIONER

## 2022-12-27 PROCEDURE — 99214 OFFICE O/P EST MOD 30 MIN: CPT | Mod: S$GLB,,, | Performed by: NURSE PRACTITIONER

## 2022-12-27 PROCEDURE — 99999 PR PBB SHADOW E&M-EST. PATIENT-LVL IV: ICD-10-PCS | Mod: PBBFAC,,, | Performed by: NURSE PRACTITIONER

## 2022-12-27 PROCEDURE — 3008F PR BODY MASS INDEX (BMI) DOCUMENTED: ICD-10-PCS | Mod: CPTII,S$GLB,, | Performed by: NURSE PRACTITIONER

## 2022-12-27 RX ORDER — FLUOXETINE HYDROCHLORIDE 40 MG/1
40 CAPSULE ORAL DAILY
Qty: 30 CAPSULE | Refills: 1 | Status: SHIPPED | OUTPATIENT
Start: 2022-12-27 | End: 2023-02-27 | Stop reason: SDUPTHER

## 2022-12-27 NOTE — PROGRESS NOTES
Subjective:       Patient ID: Marshal Baer is a 32 y.o. male.    Chief Complaint: Medication Problem (Discuss medication)    HPI    Patient is a 32 year old white male with Asthma, chronic allergies, deaf in right ear since birth with bone anchored hearing aide right side 11/2022, Fatty Liver with history of elevated LFTs, small hiatal hernia with chronic GERD, Obesity, Anxiety and intermittent explosive disorder, TED with CPAP ordered followed by [facility], and BPH with urinary frequency followed by [facility] that is here today for report of increased anxiety.     Anxiety and Intermittent Explosive Disorder  Currently taking Fluoxetine 20 mg daily  Reports he notes medication not as effective as when first started  Reports increased anxiety, feeling stressed and overwhelmed.  Will increase to 40 mg daily and recheck in 6 weeks.    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       Mixed Conductive and Sensorineural hearing loss/ Bone Anchored hearing aide right side.  Followed by ENT     BPH  Followed by [facility] NP Madhav     Small hiatal hernia with chronic GERD  Followed by [facility]     Fatty Liver  03/23/2022 CT abdomen pelvis:  Diffuse hepatic steatosis similar to prior exams.  7/10/2018 hepatitis C negative    Obstructive Sleep Apnea  Reports just had sleep studies done and sent his results to [facility]  DME company is waiting on provider to fax over equipment orders for CPAP       Review of Systems   Constitutional:  Negative for activity change, appetite change, fatigue, fever and unexpected weight change.   HENT:  Negative for congestion, ear pain, mouth sores, nosebleeds, postnasal drip, rhinorrhea, sinus pressure, sneezing, sore  throat, trouble swallowing and voice change.    Eyes: Negative.    Respiratory:  Negative for cough, chest tightness and shortness of breath.    Cardiovascular:  Negative for chest pain, palpitations and leg swelling.   Gastrointestinal: Negative.  Negative for abdominal pain, blood in stool, constipation, diarrhea, nausea and vomiting.   Endocrine: Negative.    Genitourinary:  Negative for difficulty urinating, dysuria, flank pain, hematuria and urgency.   Musculoskeletal:  Negative for arthralgias, back pain, gait problem, joint swelling, myalgias and neck pain.   Skin:  Negative for color change, rash and wound.   Allergic/Immunologic: Negative for immunocompromised state.   Neurological:  Negative for dizziness, tremors, seizures, syncope, speech difficulty and headaches.   Hematological:  Negative for adenopathy. Does not bruise/bleed easily.   Psychiatric/Behavioral:  Positive for sleep disturbance. Negative for behavioral problems, dysphoric mood and suicidal ideas. The patient is nervous/anxious.        Objective:     Vitals:    12/27/22 1431 12/27/22 1505   BP: (!) 128/92 124/86   BP Location: Left arm    Patient Position: Sitting    BP Method: Large (Manual)    Pulse: 68    Temp: 98.1 °F (36.7 °C)    TempSrc: Temporal    SpO2: 97%    Weight: 126.7 kg (279 lb 5.2 oz)    Height: 6' (1.829 m)           Physical Exam  Constitutional:       General: He is not in acute distress.     Appearance: Normal appearance. He is obese. He is not toxic-appearing or diaphoretic.      Comments: Body mass index is 36.04 kg/m².     HENT:      Head: Normocephalic and atraumatic.      Right Ear: Tympanic membrane, ear canal and external ear normal.      Left Ear: Tympanic membrane, ear canal and external ear normal.      Ears:      Comments: Hearing loss both ears - has bone-anchored hearing aide to right ear.     Nose: No congestion.      Mouth/Throat:      Pharynx: No oropharyngeal exudate or posterior oropharyngeal erythema.    Eyes:      General:         Right eye: No discharge.         Left eye: No discharge.      Extraocular Movements: Extraocular movements intact.      Conjunctiva/sclera: Conjunctivae normal.   Cardiovascular:      Rate and Rhythm: Normal rate and regular rhythm.      Heart sounds: Normal heart sounds.   Pulmonary:      Effort: Pulmonary effort is normal. No respiratory distress.      Breath sounds: Normal breath sounds. No stridor. No wheezing or rales.   Abdominal:      General: There is no distension.   Musculoskeletal:         General: Normal range of motion.   Skin:     General: Skin is warm and dry.   Neurological:      Mental Status: He is alert and oriented to person, place, and time.   Psychiatric:         Mood and Affect: Mood normal.         Behavior: Behavior normal.         Thought Content: Thought content normal.         Judgment: Judgment normal.         Assessment:         ICD-10-CM ICD-9-CM   1. Anxiety  F41.9 300.00   2. Status post placement of bone anchored hearing aid (BAHA)  Z96.21 V45.89       Plan:       Anxiety  Increase Fluoxetine to 40 mg daily  Follow up in 6 weeks.  -     FLUoxetine 40 MG capsule; Take 1 capsule (40 mg total) by mouth once daily.  Dispense: 30 capsule; Refill: 1    Status post placement of bone anchored hearing aid (BAHA)      Follow up in about 6 weeks (around 2/7/2023) for Virtual Visit - medication follow up.     Patient's Medications   New Prescriptions    No medications on file   Previous Medications    ALBUTEROL (ACCUNEB) 0.63 MG/3 ML NEBU    Take 3 mLs (0.63 mg total) by nebulization every 6 (six) hours as needed (wheezing). Rescue    FLUTICASONE PROPION-SALMETEROL 115-21 MCG/DOSE (ADVAIR HFA) 115-21 MCG/ACTUATION HFAA INHALER    Inhale 2 puffs into the lungs every 12 (twelve) hours. Controller    MONTELUKAST (SINGULAIR) 10 MG TABLET    Take 1 tablet (10 mg total) by mouth every evening.    PANTOPRAZOLE (PROTONIX) 40 MG TABLET    Take 1 tablet (40 mg total) by  mouth once daily.   Modified Medications    Modified Medication Previous Medication    FLUOXETINE 40 MG CAPSULE FLUoxetine 20 MG capsule       Take 1 capsule (40 mg total) by mouth once daily.    Take 1 capsule (20 mg total) by mouth once daily.   Discontinued Medications    HYDROCODONE-ACETAMINOPHEN (NORCO) 5-325 MG PER TABLET    Take 1 tablet by mouth every 6 (six) hours as needed for Pain.    ONDANSETRON (ZOFRAN-ODT) 4 MG TBDL    Dissolve 1 tablet (4 mg total) by mouth every 6 (six) hours as needed (Nausea).       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    Benign prostatic hyperplasia with urinary frequency 12/10/2020    Class 2 obesity due to excess calories without serious comorbidity with body mass index (BMI) of 35.0 to 35.9 in adult 05/07/2020    - discussed recommendation for diet and cardiovascular exercise - counseling on lifestyle modifications for risk factor reduction    Deafness in right ear     since birth    Elevated LFTs 01/04/2021    Lab Results Component Value Date  ALT 44 04/08/2022  AST 34 04/08/2022  ALKPHOS 79 04/08/2022  BILITOT 0.4 04/08/2022  - 7/10/2018 hepatitis C negative - 3/23/22 CT abd/pelvis: fatty liver disease normal - likely related with diet and lifestyle - pt denies alcohol use - discussed recommendation for diet and cardiovascular exercise - counseling on lifestyle modifications for risk factor reduction -    Epididymal cyst 04/19/2021    Noted on u/s dated 8/17/2016.     Fatty liver 04/12/2022    - 03/23/2022 CT abdomen pelvis:  Diffuse hepatic steatosis similar to prior exams. - 7/10/2018 hepatitis C negative Lab Results Component Value Date  ALT 44 04/08/2022  AST 34 04/08/2022  ALKPHOS 79 04/08/2022  BILITOT 0.4 04/08/2022  - discussed recommendation for diet and cardiovascular exercise - counseling on lifestyle modifications  for risk factor reduction    Gastroesophageal reflux disease without esophagitis 01/30/2020    - followed by gastroenterology - 08/07/2020 EGD:  Small hiatal hernia.  Negative H pylori.  No dysplasia.  No esophagitis    Hiatal hernia with gastroesophageal reflux disease and esophagitis     History of peptic ulcer 07/10/2020    Intermittent explosive disorder 01/31/2020    Varicocele 08/17/2016       Past Surgical History:   Procedure Laterality Date    COLONOSCOPY N/A 1/12/2021    Procedure: COLONOSCOPY;  Surgeon: Valentina Patton MD;  Location: [facility] END[facility];  Service: Endoscopy;  Laterality: N/A;    ENDOSCOPY  11/2019    ESOPHAGOGASTRODUODENOSCOPY N/A 8/7/2020    Procedure: EGD (ESOPHAGOGASTRODUODENOSCOPY);  Surgeon: Valentina Patton MD;  Location: [facility] ENDO;  Service: Endoscopy;  Laterality: N/A;    IMPLANTATION OF BONE ANCHORED HEARING AID (BAHA) Right 11/21/2022    Procedure: INSERTION, BAHA;  Surgeon: Edu Mistry MD;  Location: [facility] OR [address] [place] [address];  Service: ENT;  Laterality: Right;    VASECTOMY  12/27/2019       Family History   Problem Relation Age of Onset    Heart disease Mother         aortic calcifications    Hyperlipidemia Mother     Diabetes Mother     Cancer Mother         lung cancer 55; thyroid cancer 55, colon cancer 55, lesion on the liver    Hypertension Mother     Lung cancer Mother     Hyperlipidemia Father     Heart disease Father         MI - age 30 - stents placed; CHF    Hypertension Father     Diabetes Father     Alcohol abuse Father     Heart disease Paternal Uncle 42        Heart Attack in 1999    Cancer Maternal Grandmother         Breast  cancer 2006    No Known Problems Maternal Grandfather     Prostate cancer Neg Hx     Kidney disease Neg Hx        Social History     Socioeconomic History    Marital status:     Number of children: 2   Occupational History     Employer: [employer]    Occupation: chemical    Tobacco Use    Smoking status: Never     Smokeless tobacco: Never   Substance and Sexual Activity    Alcohol use: Yes     Comment: rarely    Drug use: No    Sexual activity: Yes     Partners: Female     Social Determinants of Health     Financial Resource Strain: Low Risk     Difficulty of Paying Living Expenses: Not hard at all   Food Insecurity: No Food Insecurity    Worried About Running Out of Food in the Last Year: Never true    Ran Out of Food in the Last Year: Never true   Transportation Needs: No Transportation Needs    Lack of Transportation (Medical): No    Lack of Transportation (Non-Medical): No   Physical Activity: Sufficiently Active    Days of Exercise per Week: 7 days    Minutes of Exercise per Session: 80 min   Stress: Stress Concern Present    Feeling of Stress : To some extent   Social Connections: Unknown    Frequency of Communication with Friends and Family: Three times a week    Frequency of Social Gatherings with Friends and Family: Once a week    Active Member of Clubs or Organizations: No    Attends Club or Organization Meetings: Never    Marital Status:    Housing Stability: Low Risk     Unable to Pay for Housing in the Last Year: No    Number of Places Lived in the Last Year: 1    Unstable Housing in the Last Year: No

## 2023-01-23 ENCOUNTER — CLINICAL SUPPORT (OUTPATIENT)
Dept: AUDIOLOGY | Facility: CLINIC | Age: 33
End: 2023-01-23
Payer: COMMERCIAL

## 2023-01-23 DIAGNOSIS — H90.6 MIXED CONDUCTIVE AND SENSORINEURAL HEARING LOSS OF BOTH EARS: Primary | ICD-10-CM

## 2023-01-23 PROCEDURE — 99499 UNLISTED E&M SERVICE: CPT | Mod: S$GLB,,, | Performed by: AUDIOLOGIST

## 2023-01-23 PROCEDURE — 99999 PR PBB SHADOW E&M-EST. PATIENT-LVL I: CPT | Mod: PBBFAC,,, | Performed by: AUDIOLOGIST

## 2023-01-23 PROCEDURE — 99211 OFF/OP EST MAY X REQ PHY/QHP: CPT | Mod: PBBFAC | Performed by: AUDIOLOGIST

## 2023-01-23 PROCEDURE — 99999 PR PBB SHADOW E&M-EST. PATIENT-LVL I: ICD-10-PCS | Mod: PBBFAC,,, | Performed by: AUDIOLOGIST

## 2023-01-23 PROCEDURE — 99499 NO LOS: ICD-10-PCS | Mod: S$GLB,,, | Performed by: AUDIOLOGIST

## 2023-01-24 NOTE — PROGRESS NOTES
Marshal Baer, a 32 y.o. male, was seen today for a one month bone conduction hearing aid check.  Pt reported that his Osia is working well and he has enjoyed discovering all the sounds that he was missing.  He noted that initially the sound was a bit distracting, but now sounds more natural and he is happy with the overall sound quality.  We reviewed the use of the mini microphone and the headband used for security.  Pt is doing well overall.  No changes were made to programming or magnet strength today.  Aided testing was completed revealing 93% response to sentences in noise and consistent responses to Ling 6 Speech Sounds at 15 dB.  Testing confirms significant benefit while utilizing the processor.Pt will follow up annually unless issues arise.    Bone Conduction Hearing Aid:  : Cochlear  Model: Osia   Side: Right  SN: [id number]  Backup SN: [id number]  Accessory: Mini Microphone        Recommendations:  Daily use of Osia  Follow up in one year  Contact office if programming issues arise  Contact [employer] if device issues arise

## 2023-01-29 DIAGNOSIS — Z87.11 HISTORY OF PEPTIC ULCER: ICD-10-CM

## 2023-01-29 DIAGNOSIS — K21.9 GASTROESOPHAGEAL REFLUX DISEASE WITHOUT ESOPHAGITIS: ICD-10-CM

## 2023-01-29 DIAGNOSIS — J31.0 CHRONIC RHINITIS: ICD-10-CM

## 2023-01-30 ENCOUNTER — PATIENT MESSAGE (OUTPATIENT)
Dept: AUDIOLOGY | Facility: CLINIC | Age: 33
End: 2023-01-30
Payer: COMMERCIAL

## 2023-01-30 RX ORDER — MONTELUKAST SODIUM 10 MG/1
10 TABLET ORAL NIGHTLY
Qty: 30 TABLET | Refills: 11 | Status: SHIPPED | OUTPATIENT
Start: 2023-01-30 | End: 2023-10-05

## 2023-01-30 RX ORDER — PANTOPRAZOLE SODIUM 40 MG/1
40 TABLET, DELAYED RELEASE ORAL DAILY
Qty: 30 TABLET | Refills: 11 | Status: SHIPPED | OUTPATIENT
Start: 2023-01-30 | End: 2023-10-05

## 2023-01-30 NOTE — TELEPHONE ENCOUNTER
Refill Routing Note   Medication(s) are not appropriate for processing by [facility] for the following reason(s):        Refill Routing Note   Medication(s) are not appropriate for processing by [facility] for the following reason(s):       NPP    [facility] action(s):  Route                   Medication Therapy Plan: NON PARTICIPATING PROVIDER      Appointments  past 12m or future 3m with PCP    Date Provider   Last Visit   12/27/2022 Emily Barksdale NP   Next Visit   2/7/2023 Emily Barksdale NP   ED visits in past 90 days: 0        Note composed:12:20 AM 01/30/2023

## 2023-02-07 ENCOUNTER — OFFICE VISIT (OUTPATIENT)
Dept: FAMILY MEDICINE | Facility: CLINIC | Age: 33
End: 2023-02-07
Payer: COMMERCIAL

## 2023-02-07 DIAGNOSIS — F41.9 ANXIETY: Primary | ICD-10-CM

## 2023-02-07 DIAGNOSIS — J45.30 MILD PERSISTENT ASTHMA WITHOUT COMPLICATION: ICD-10-CM

## 2023-02-07 DIAGNOSIS — G47.33 OSA ON CPAP: ICD-10-CM

## 2023-02-07 DIAGNOSIS — H91.91 DEAFNESS IN RIGHT EAR: ICD-10-CM

## 2023-02-07 DIAGNOSIS — Z96.21 STATUS POST PLACEMENT OF BONE ANCHORED HEARING AID (BAHA): ICD-10-CM

## 2023-02-07 DIAGNOSIS — F63.81 INTERMITTENT EXPLOSIVE DISORDER: ICD-10-CM

## 2023-02-07 PROCEDURE — 99212 OFFICE O/P EST SF 10 MIN: CPT | Mod: 95,,, | Performed by: NURSE PRACTITIONER

## 2023-02-07 PROCEDURE — 1160F RVW MEDS BY RX/DR IN RCRD: CPT | Mod: CPTII,95,, | Performed by: NURSE PRACTITIONER

## 2023-02-07 PROCEDURE — 1160F PR REVIEW ALL MEDS BY PRESCRIBER/CLIN PHARMACIST DOCUMENTED: ICD-10-PCS | Mod: CPTII,95,, | Performed by: NURSE PRACTITIONER

## 2023-02-07 PROCEDURE — 1159F MED LIST DOCD IN RCRD: CPT | Mod: CPTII,95,, | Performed by: NURSE PRACTITIONER

## 2023-02-07 PROCEDURE — 99212 PR OFFICE/OUTPT VISIT, EST, LEVL II, 10-19 MIN: ICD-10-PCS | Mod: 95,,, | Performed by: NURSE PRACTITIONER

## 2023-02-07 PROCEDURE — 1159F PR MEDICATION LIST DOCUMENTED IN MEDICAL RECORD: ICD-10-PCS | Mod: CPTII,95,, | Performed by: NURSE PRACTITIONER

## 2023-02-07 NOTE — PROGRESS NOTES
Subjective:       Patient ID: Marshal Baer is a 32 y.o. male.    Chief Complaint: Follow-up (Medication follow up)    Follow-up  Pertinent negatives include no arthralgias, chest pain, headaches, joint swelling, neck pain, vomiting or weakness.     The patient location is: [place]  The chief complaint leading to consultation is: medication follow up    Visit type: audiovisual    Face to Face time with patient: 7  10 minutes of total time spent on the encounter, which includes face to face time and non-face to face time preparing to see the patient (eg, review of tests), Obtaining and/or reviewing separately obtained history, Documenting clinical information in the electronic or other health record, Independently interpreting results (not separately reported) and communicating results to the patient/family/caregiver, or Care coordination (not separately reported).     Each patient to whom he or she provides medical services by telemedicine is:  (1) informed of the relationship between the physician and patient and the respective role of any other health care provider with respect to management of the patient; and (2) notified that he or she may decline to receive medical services by telemedicine and may withdraw from such care at any time.    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     Anxiety and Intermittent Explosive Disorder  Currently taking Fluoxetine 40 mg daily  Reports the increased anxiety, feeling stressed and overwhelmed are better better controlled on increased dose.  Follow up in May  2023.     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        Mixed Conductive and Sensorineural hearing loss/ Bone Anchored hearing aide right side.  Followed by ENT     BPH  Followed by [facility] NP Corey     Small hiatal hernia with chronic GERD  Followed by [facility]     Fatty Liver  03/23/2022 CT abdomen pelvis:  Diffuse hepatic steatosis similar to prior exams.  7/10/2018 hepatitis C negative     Obstructive Sleep Apnea  Reports sleep studies done and sent his results to [facility]  Now on CPAP nightly and doing well.    Review of Systems   Constitutional:  Negative for activity change and unexpected weight change.   HENT:  Positive for hearing loss. Negative for rhinorrhea and trouble swallowing.    Eyes:  Negative for discharge and visual disturbance.   Respiratory:  Negative for chest tightness and wheezing.    Cardiovascular:  Negative for chest pain and palpitations.   Gastrointestinal:  Negative for blood in stool, constipation, diarrhea and vomiting.   Endocrine: Negative for polydipsia and polyuria.   Genitourinary:  Negative for difficulty urinating, hematuria and urgency.   Musculoskeletal:  Negative for arthralgias, joint swelling and neck pain.   Neurological:  Negative for weakness and headaches.   Psychiatric/Behavioral:  Negative for confusion and dysphoric mood.        Objective:     There were no vitals filed for this visit.       Physical Exam  Constitutional:       General: He is not in acute distress.     Appearance: He is well-developed. He is not diaphoretic.   HENT:      Head: Normocephalic and atraumatic.   Eyes:      General: No scleral icterus.        Right eye: No discharge.         Left eye: No discharge.      Conjunctiva/sclera: Conjunctivae  normal.      Pupils: Pupils are equal, round, and reactive to light.   Pulmonary:      Effort: Pulmonary effort is normal. No respiratory distress.   Neurological:      Mental Status: He is alert and oriented to person, place, and time.   Psychiatric:         Behavior: Behavior normal.         Thought Content: Thought content normal.         Judgment: Judgment normal.         Assessment:         ICD-10-CM ICD-9-CM   1. Anxiety  F41.9 300.00   2. Intermittent explosive disorder  F63.81 312.34   3. Mild persistent asthma without complication  J45.30 493.90   4. Deafness in right ear  H91.91 389.9   5. Status post placement of bone anchored hearing aid (BAHA)  Z96.21 V45.89   6. TED on CPAP  G47.33 327.23    Z99.89 V46.8       Plan:       Anxiety  Continue current medication(s).  Follow up in 3 months.    Intermittent explosive disorder  Continue current medication(s).  Follow up in 3 months.    Mild persistent asthma without complication  Continue current medication(s).  Follow up in 3 months.    Deafness in right ear  Hearing aide in place    Status post placement of bone anchored hearing aid (BAHA)    TED on CPAP  Now using CPAP nightly and reports he is doing well with machine.      Follow up in about 3 months (around 5/7/2023) for fasting labs and WELLNESS EXAM.     Patient's Medications   New Prescriptions    No medications on file   Previous Medications    ALBUTEROL (ACCUNEB) 0.63 MG/3 ML NEBU    Take 3 mLs (0.63 mg total) by nebulization every 6 (six) hours as needed (wheezing). Rescue    FLUOXETINE 40 MG CAPSULE    Take 1 capsule (40 mg total) by mouth once daily.    FLUTICASONE PROPION-SALMETEROL 115-21 MCG/DOSE (ADVAIR HFA) 115-21 MCG/ACTUATION HFAA INHALER    Inhale 2 puffs into the lungs every 12 (twelve) hours. Controller    MONTELUKAST (SINGULAIR) 10 MG TABLET    Take 1 tablet (10 mg total) by mouth every evening.    PANTOPRAZOLE (PROTONIX) 40 MG TABLET    Take 1 tablet (40 mg total) by mouth once daily.    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    Benign prostatic hyperplasia with urinary frequency 12/10/2020    Class 2 obesity due to excess calories without serious comorbidity with body mass index (BMI) of 35.0 to 35.9 in adult 05/07/2020    - discussed recommendation for diet and cardiovascular exercise - counseling on lifestyle modifications for risk factor reduction    Deafness in right ear     since birth    Elevated LFTs 01/04/2021    Lab Results Component Value Date  ALT 44 04/08/2022  AST 34 04/08/2022  ALKPHOS 79 04/08/2022  BILITOT 0.4 04/08/2022  - 7/10/2018 hepatitis C negative - 3/23/22 CT abd/pelvis: fatty liver disease normal - likely related with diet and lifestyle - pt denies alcohol use - discussed recommendation for diet and cardiovascular exercise - counseling on lifestyle modifications for risk factor reduction -    Epididymal cyst 04/19/2021    Noted on u/s dated 8/17/2016.     Fatty liver 04/12/2022    - 03/23/2022 CT abdomen pelvis:  Diffuse hepatic steatosis similar to prior exams. - 7/10/2018 hepatitis C negative Lab Results Component Value Date  ALT 44 04/08/2022  AST 34 04/08/2022  ALKPHOS 79 04/08/2022  BILITOT 0.4 04/08/2022  - discussed recommendation for diet and cardiovascular exercise - counseling on lifestyle modifications for risk factor reduction    Gastroesophageal reflux disease without esophagitis 01/30/2020    - followed by gastroenterology - 08/07/2020 EGD:  Small hiatal hernia.  Negative H pylori.  No dysplasia.  No esophagitis    Hiatal hernia with gastroesophageal reflux disease and esophagitis     History of peptic ulcer 07/10/2020    Intermittent explosive disorder 01/31/2020    Varicocele 08/17/2016       Past Surgical  History:   Procedure Laterality Date    COLONOSCOPY N/A 1/12/2021    Procedure: COLONOSCOPY;  Surgeon: Valentina Patton MD;  Location: [facility];  Service: Endoscopy;  Laterality: N/A;    ENDOSCOPY  11/2019    ESOPHAGOGASTRODUODENOSCOPY N/A 8/7/2020    Procedure: EGD (ESOPHAGOGASTRODUODENOSCOPY);  Surgeon: Valentina Patton MD;  Location: [facility];  Service: Endoscopy;  Laterality: N/A;    IMPLANTATION OF BONE ANCHORED HEARING AID (BAHA) Right 11/21/2022    Procedure: INSERTION, BAHA;  Surgeon: Edu Mistry MD;  Location: [facility] OR [address] [place] [address];  Service: ENT;  Laterality: Right;    VASECTOMY  12/27/2019       Family History   Problem Relation Age of Onset    Heart disease Mother         aortic calcifications    Hyperlipidemia Mother     Diabetes Mother     Cancer Mother         lung cancer 55; thyroid cancer 55, colon cancer 55, lesion on the liver    Hypertension Mother     Lung cancer Mother     Hyperlipidemia Father     Heart disease Father         MI - age 30 - stents placed; CHF    Hypertension Father     Diabetes Father     Alcohol abuse Father     Heart disease Paternal Uncle 42        Heart Attack in 1999    Cancer Maternal Grandmother         Breast  cancer 2006    No Known Problems Maternal Grandfather     Prostate cancer Neg Hx     Kidney disease Neg Hx        Social History     Socioeconomic History    Marital status:     Number of children: 2   Occupational History     Employer: [employer]    Occupation: chemical    Tobacco Use    Smoking status: Never    Smokeless tobacco: Never   Substance and Sexual Activity    Alcohol use: Yes     Comment: rarely    Drug use: No    Sexual activity: Yes     Partners: Female     Social Determinants of Health     Financial Resource Strain: Low Risk     Difficulty of Paying Living Expenses: Not hard at all   Food Insecurity: No Food Insecurity    Worried About Running Out of Food in the Last Year: Never true    Ran Out of Food in the  Last Year: Never true   Transportation Needs: No Transportation Needs    Lack of Transportation (Medical): No    Lack of Transportation (Non-Medical): No   Physical Activity: Sufficiently Active    Days of Exercise per Week: 5 days    Minutes of Exercise per Session: 100 min   Stress: No Stress Concern Present    Feeling of Stress : Not at all   Social Connections: Unknown    Frequency of Communication with Friends and Family: More than three times a week    Frequency of Social Gatherings with Friends and Family: Twice a week    Active Member of Clubs or Organizations: No    Attends Club or Organization Meetings: Never    Marital Status:    Housing Stability: Low Risk     Unable to Pay for Housing in the Last Year: No    Number of Places Lived in the Last Year: 1    Unstable Housing in the Last Year: No

## 2023-02-13 ENCOUNTER — CLINICAL SUPPORT (OUTPATIENT)
Dept: AUDIOLOGY | Facility: CLINIC | Age: 33
End: 2023-02-13
Payer: COMMERCIAL

## 2023-02-13 DIAGNOSIS — H90.6 MIXED CONDUCTIVE AND SENSORINEURAL HEARING LOSS OF BOTH EARS: Primary | ICD-10-CM

## 2023-02-13 PROCEDURE — 99499 UNLISTED E&M SERVICE: CPT | Mod: S$GLB,,, | Performed by: AUDIOLOGIST

## 2023-02-13 PROCEDURE — 99499 NO LOS: ICD-10-PCS | Mod: S$GLB,,, | Performed by: AUDIOLOGIST

## 2023-02-14 NOTE — PROGRESS NOTES
Marshal Baer, a 32 y.o. male, was seen today for a bone conduction hearing aid check.  Pt complained that his Osia magnet is too tight.  His magnet was changed from a 4 to a 3 both is processor and his backup processor.  Pt was given an extra strength 4 to change at home if he feels that is not strong enough.  Pt reported good subjective benefit and a comfortable fit.  He will follow annually unless issues arise.    Bone Conduction Hearing Aid:  : Cochlear  Model: Osia   Side: Right  SN: [id number]  Backup SN: [id number]  Accessory: Mini Microphone        Recommendations:  Daily use of Osia  Follow up in one year  Contact office if programming issues arise  Contact Cochlear if device issues arise

## 2023-02-16 ENCOUNTER — PATIENT MESSAGE (OUTPATIENT)
Dept: FAMILY MEDICINE | Facility: CLINIC | Age: 33
End: 2023-02-16
Payer: COMMERCIAL

## 2023-02-17 ENCOUNTER — PATIENT MESSAGE (OUTPATIENT)
Dept: FAMILY MEDICINE | Facility: CLINIC | Age: 33
End: 2023-02-17
Payer: COMMERCIAL

## 2023-02-27 DIAGNOSIS — F41.9 ANXIETY: ICD-10-CM

## 2023-02-27 RX ORDER — FLUOXETINE HYDROCHLORIDE 40 MG/1
40 CAPSULE ORAL DAILY
Qty: 30 CAPSULE | Refills: 1 | Status: SHIPPED | OUTPATIENT
Start: 2023-02-27 | End: 2023-05-01 | Stop reason: SDUPTHER

## 2023-03-02 ENCOUNTER — OFFICE VISIT (OUTPATIENT)
Dept: SLEEP MEDICINE | Facility: CLINIC | Age: 33
End: 2023-03-02
Payer: COMMERCIAL

## 2023-03-02 ENCOUNTER — PATIENT MESSAGE (OUTPATIENT)
Dept: SLEEP MEDICINE | Facility: CLINIC | Age: 33
End: 2023-03-02

## 2023-03-02 VITALS
BODY MASS INDEX: 35.9 KG/M2 | DIASTOLIC BLOOD PRESSURE: 75 MMHG | SYSTOLIC BLOOD PRESSURE: 126 MMHG | HEART RATE: 63 BPM | WEIGHT: 264.69 LBS

## 2023-03-02 DIAGNOSIS — G47.33 OSA (OBSTRUCTIVE SLEEP APNEA): ICD-10-CM

## 2023-03-02 PROCEDURE — 3008F PR BODY MASS INDEX (BMI) DOCUMENTED: ICD-10-PCS | Mod: CPTII,S$GLB,, | Performed by: NURSE PRACTITIONER

## 2023-03-02 PROCEDURE — 99213 OFFICE O/P EST LOW 20 MIN: CPT | Mod: S$GLB,,, | Performed by: NURSE PRACTITIONER

## 2023-03-02 PROCEDURE — 1159F MED LIST DOCD IN RCRD: CPT | Mod: CPTII,S$GLB,, | Performed by: NURSE PRACTITIONER

## 2023-03-02 PROCEDURE — 3078F PR MOST RECENT DIASTOLIC BLOOD PRESSURE < 80 MM HG: ICD-10-PCS | Mod: CPTII,S$GLB,, | Performed by: NURSE PRACTITIONER

## 2023-03-02 PROCEDURE — 99999 PR PBB SHADOW E&M-EST. PATIENT-LVL III: ICD-10-PCS | Mod: PBBFAC,,, | Performed by: NURSE PRACTITIONER

## 2023-03-02 PROCEDURE — 99999 PR PBB SHADOW E&M-EST. PATIENT-LVL III: CPT | Mod: PBBFAC,,, | Performed by: NURSE PRACTITIONER

## 2023-03-02 PROCEDURE — 3078F DIAST BP <80 MM HG: CPT | Mod: CPTII,S$GLB,, | Performed by: NURSE PRACTITIONER

## 2023-03-02 PROCEDURE — 1159F PR MEDICATION LIST DOCUMENTED IN MEDICAL RECORD: ICD-10-PCS | Mod: CPTII,S$GLB,, | Performed by: NURSE PRACTITIONER

## 2023-03-02 PROCEDURE — 3008F BODY MASS INDEX DOCD: CPT | Mod: CPTII,S$GLB,, | Performed by: NURSE PRACTITIONER

## 2023-03-02 PROCEDURE — 3074F PR MOST RECENT SYSTOLIC BLOOD PRESSURE < 130 MM HG: ICD-10-PCS | Mod: CPTII,S$GLB,, | Performed by: NURSE PRACTITIONER

## 2023-03-02 PROCEDURE — 3074F SYST BP LT 130 MM HG: CPT | Mod: CPTII,S$GLB,, | Performed by: NURSE PRACTITIONER

## 2023-03-02 PROCEDURE — 99213 PR OFFICE/OUTPT VISIT, EST, LEVL III, 20-29 MIN: ICD-10-PCS | Mod: S$GLB,,, | Performed by: NURSE PRACTITIONER

## 2023-03-02 NOTE — PROGRESS NOTES
Cc: TED    He underwent HST since seen and began apap 6-12cm12/29/22.  Acclimated quickly to it. Switched from nose mask to FFM. Occasional mask leaks bothers spouse. Denies recurrent apneic pauses or snoring. Not as apt to doze off at work/nap on down time. Feels more refreshed and awake throughout day. Working on wgt loss.ESS<8  Remote 30davg 7h/n AHI 1.0, 90% tile 9.9cm      SH: . [employer] ,4p-4a, 4a-4p    /75 (BP Location: Left arm, Patient Position: Sitting, BP Method: Large (Automatic))   Pulse 63   Wt 120.1 kg (264 lb 11.2 oz)   BMI 35.90 kg/m²   GENERAL: Obese body habitus, well groomed     11/2022 HST 6(RDI 17)      ASSESSMENT:   TED. Excellent adherence with PAP, benefits from therapy AHI<5      PLAN:   1. Continue apap 6-12cm. DME ACCESS supplies  2. Discussed control of TED  Continue wgt loss efforts/consider requal HST future, discussed Inspire candidacy  RTC 1 yr, sooner if needed

## 2023-03-10 ENCOUNTER — OFFICE VISIT (OUTPATIENT)
Dept: FAMILY MEDICINE | Facility: CLINIC | Age: 33
End: 2023-03-10
Payer: COMMERCIAL

## 2023-03-10 VITALS
HEIGHT: 72 IN | BODY MASS INDEX: 35.82 KG/M2 | OXYGEN SATURATION: 96 % | WEIGHT: 264.44 LBS | DIASTOLIC BLOOD PRESSURE: 74 MMHG | SYSTOLIC BLOOD PRESSURE: 116 MMHG | HEART RATE: 60 BPM | RESPIRATION RATE: 18 BRPM | TEMPERATURE: 98 F

## 2023-03-10 DIAGNOSIS — E88.819 INSULIN RESISTANCE: ICD-10-CM

## 2023-03-10 DIAGNOSIS — G47.33 OSA ON CPAP: ICD-10-CM

## 2023-03-10 DIAGNOSIS — K76.0 FATTY LIVER: ICD-10-CM

## 2023-03-10 DIAGNOSIS — E66.01 SEVERE OBESITY: Primary | ICD-10-CM

## 2023-03-10 PROCEDURE — 1160F PR REVIEW ALL MEDS BY PRESCRIBER/CLIN PHARMACIST DOCUMENTED: ICD-10-PCS | Mod: CPTII,S$GLB,, | Performed by: STUDENT IN AN ORGANIZED HEALTH CARE EDUCATION/TRAINING PROGRAM

## 2023-03-10 PROCEDURE — 1159F PR MEDICATION LIST DOCUMENTED IN MEDICAL RECORD: ICD-10-PCS | Mod: CPTII,S$GLB,, | Performed by: STUDENT IN AN ORGANIZED HEALTH CARE EDUCATION/TRAINING PROGRAM

## 2023-03-10 PROCEDURE — 3008F BODY MASS INDEX DOCD: CPT | Mod: CPTII,S$GLB,, | Performed by: STUDENT IN AN ORGANIZED HEALTH CARE EDUCATION/TRAINING PROGRAM

## 2023-03-10 PROCEDURE — 1159F MED LIST DOCD IN RCRD: CPT | Mod: CPTII,S$GLB,, | Performed by: STUDENT IN AN ORGANIZED HEALTH CARE EDUCATION/TRAINING PROGRAM

## 2023-03-10 PROCEDURE — 99214 OFFICE O/P EST MOD 30 MIN: CPT | Mod: S$GLB,,, | Performed by: STUDENT IN AN ORGANIZED HEALTH CARE EDUCATION/TRAINING PROGRAM

## 2023-03-10 PROCEDURE — 3078F PR MOST RECENT DIASTOLIC BLOOD PRESSURE < 80 MM HG: ICD-10-PCS | Mod: CPTII,S$GLB,, | Performed by: STUDENT IN AN ORGANIZED HEALTH CARE EDUCATION/TRAINING PROGRAM

## 2023-03-10 PROCEDURE — 3078F DIAST BP <80 MM HG: CPT | Mod: CPTII,S$GLB,, | Performed by: STUDENT IN AN ORGANIZED HEALTH CARE EDUCATION/TRAINING PROGRAM

## 2023-03-10 PROCEDURE — 99999 PR PBB SHADOW E&M-EST. PATIENT-LVL IV: CPT | Mod: PBBFAC,,, | Performed by: STUDENT IN AN ORGANIZED HEALTH CARE EDUCATION/TRAINING PROGRAM

## 2023-03-10 PROCEDURE — 3074F SYST BP LT 130 MM HG: CPT | Mod: CPTII,S$GLB,, | Performed by: STUDENT IN AN ORGANIZED HEALTH CARE EDUCATION/TRAINING PROGRAM

## 2023-03-10 PROCEDURE — 99214 PR OFFICE/OUTPT VISIT, EST, LEVL IV, 30-39 MIN: ICD-10-PCS | Mod: S$GLB,,, | Performed by: STUDENT IN AN ORGANIZED HEALTH CARE EDUCATION/TRAINING PROGRAM

## 2023-03-10 PROCEDURE — 3074F PR MOST RECENT SYSTOLIC BLOOD PRESSURE < 130 MM HG: ICD-10-PCS | Mod: CPTII,S$GLB,, | Performed by: STUDENT IN AN ORGANIZED HEALTH CARE EDUCATION/TRAINING PROGRAM

## 2023-03-10 PROCEDURE — 99999 PR PBB SHADOW E&M-EST. PATIENT-LVL IV: ICD-10-PCS | Mod: PBBFAC,,, | Performed by: STUDENT IN AN ORGANIZED HEALTH CARE EDUCATION/TRAINING PROGRAM

## 2023-03-10 PROCEDURE — 1160F RVW MEDS BY RX/DR IN RCRD: CPT | Mod: CPTII,S$GLB,, | Performed by: STUDENT IN AN ORGANIZED HEALTH CARE EDUCATION/TRAINING PROGRAM

## 2023-03-10 PROCEDURE — 3008F PR BODY MASS INDEX (BMI) DOCUMENTED: ICD-10-PCS | Mod: CPTII,S$GLB,, | Performed by: STUDENT IN AN ORGANIZED HEALTH CARE EDUCATION/TRAINING PROGRAM

## 2023-03-10 RX ORDER — SEMAGLUTIDE 1.34 MG/ML
0.25 INJECTION, SOLUTION SUBCUTANEOUS
Qty: 1 PEN | Refills: 0 | Status: SHIPPED | OUTPATIENT
Start: 2023-03-10 | End: 2023-04-11 | Stop reason: SDUPTHER

## 2023-03-10 NOTE — PROGRESS NOTES
Subjective:      Patient ID: Marshal Baer is a 32 y.o. male.    Chief Complaint: Weight Loss    - weight loss medication   - pt with BMI over 35  - has been working on diet and exercise lost 17 pounds so far in the past 6 weeks or so   - doing with wife  - she is on the ozempic and doing really well  - he would like to try and start this medication as well   - he does have fatty liver with fairly stable labs and follows with hepatology   - he feels this medication will help to maximize his results   - goal weight 215-220 as this is where he feels the best     Review of Systems   All other systems reviewed and are negative.     Objective:     Vitals:    03/10/23 0857   BP: 116/74   Pulse: 60   Resp: 18   Temp: 97.7 °F (36.5 °C)      Physical Exam  Vitals reviewed.   Constitutional:       Appearance: Normal appearance. He is obese.   HENT:      Head: Normocephalic and atraumatic.   Eyes:      Conjunctiva/sclera: Conjunctivae normal.   Cardiovascular:      Rate and Rhythm: Normal rate and regular rhythm.      Heart sounds: Normal heart sounds.   Pulmonary:      Effort: Pulmonary effort is normal.      Breath sounds: Normal breath sounds.   Abdominal:      Palpations: Abdomen is soft.      Tenderness: There is no abdominal tenderness.   Musculoskeletal:         General: Normal range of motion.      Cervical back: Normal range of motion.      Right lower leg: No edema.      Left lower leg: No edema.   Neurological:      General: No focal deficit present.      Mental Status: He is alert and oriented to person, place, and time.   Psychiatric:         Mood and Affect: Mood normal.         Behavior: Behavior normal.      Assessment:         1. Severe obesity    2. Fatty liver    3. Insulin resistance    4. TED on CPAP            Plan:   1. Severe obesity  - semaglutide (OZEMPIC) 0.25 mg or 0.5 mg(2 mg/1.5 mL) pen injector; Inject 0.25 mg into the skin every 7 days.  Dispense: 1 pen; Refill: 0    2. Fatty liver  -  semaglutide (OZEMPIC) 0.25 mg or 0.5 mg(2 mg/1.5 mL) pen injector; Inject 0.25 mg into the skin every 7 days.  Dispense: 1 pen; Refill: 0    3. Insulin resistance  - semaglutide (OZEMPIC) 0.25 mg or 0.5 mg(2 mg/1.5 mL) pen injector; Inject 0.25 mg into the skin every 7 days.  Dispense: 1 pen; Refill: 0    4. TED on CPAP       Start Ozempic 0.25mg dose; increase to 0.5mg dose after 4 weeks if tolerating well  Continue meds as prescribed otherwise  Continue healthy lifestyle changes; start exercise   Continue routine f/u with PCP   RTC in 3 months for weight management and will likely go to 1 mg dose at that time.          Linn Carrillo   [facility]   3/10/23

## 2023-03-10 NOTE — PATIENT INSTRUCTIONS
- 4 weeks on 0.25mg dose Ozempic then can increase to 0.5mg dose for another 4 weeks if tolerating

## 2023-03-13 ENCOUNTER — TELEPHONE (OUTPATIENT)
Dept: PHARMACY | Facility: CLINIC | Age: 33
End: 2023-03-13
Payer: COMMERCIAL

## 2023-03-27 ENCOUNTER — OFFICE VISIT (OUTPATIENT)
Dept: DERMATOLOGY | Facility: CLINIC | Age: 33
End: 2023-03-27
Payer: COMMERCIAL

## 2023-03-27 DIAGNOSIS — L73.9 FOLLICULITIS: ICD-10-CM

## 2023-03-27 DIAGNOSIS — B07.9 WART OF HAND: Primary | ICD-10-CM

## 2023-03-27 PROCEDURE — 99204 OFFICE O/P NEW MOD 45 MIN: CPT | Mod: 25,S$GLB,, | Performed by: DERMATOLOGY

## 2023-03-27 PROCEDURE — 99999 PR PBB SHADOW E&M-EST. PATIENT-LVL III: CPT | Mod: PBBFAC,,, | Performed by: DERMATOLOGY

## 2023-03-27 PROCEDURE — 17110 DESTRUCTION B9 LES UP TO 14: CPT | Mod: S$GLB,,, | Performed by: DERMATOLOGY

## 2023-03-27 PROCEDURE — 17110 PR DESTRUCTION BENIGN LESIONS UP TO 14: ICD-10-PCS | Mod: S$GLB,,, | Performed by: DERMATOLOGY

## 2023-03-27 PROCEDURE — 1159F PR MEDICATION LIST DOCUMENTED IN MEDICAL RECORD: ICD-10-PCS | Mod: CPTII,S$GLB,, | Performed by: DERMATOLOGY

## 2023-03-27 PROCEDURE — 1159F MED LIST DOCD IN RCRD: CPT | Mod: CPTII,S$GLB,, | Performed by: DERMATOLOGY

## 2023-03-27 PROCEDURE — 99204 PR OFFICE/OUTPT VISIT, NEW, LEVL IV, 45-59 MIN: ICD-10-PCS | Mod: 25,S$GLB,, | Performed by: DERMATOLOGY

## 2023-03-27 PROCEDURE — 99999 PR PBB SHADOW E&M-EST. PATIENT-LVL III: ICD-10-PCS | Mod: PBBFAC,,, | Performed by: DERMATOLOGY

## 2023-03-27 RX ORDER — CLINDAMYCIN PHOSPHATE 11.9 MG/ML
SOLUTION TOPICAL
Qty: 60 ML | Refills: 5 | Status: SHIPPED | OUTPATIENT
Start: 2023-03-27 | End: 2023-10-05

## 2023-03-27 NOTE — PROGRESS NOTES
Subjective:       Patient ID:  Marshal Baer is a 32 y.o. male who presents for   Chief Complaint   Patient presents with    Warts     Left hand      Warts - Initial  Affected locations: left hand  Duration: 3 months  Signs / symptoms: rough and tender  Severity: mild  Timing: constant  Aggravated by: nothing  Relieving factors/Treatments tried: nothing    Lesion - Initial  Affected locations: back  Duration: 10 years  Signs / symptoms: itching  Severity: mild  Timing: constant  Aggravated by: nothing  Relieving factors/Treatments tried: nothing    Review of Systems   Constitutional:  Negative for fever, chills, fatigue and malaise.   Skin:  Positive for activity-related sunscreen use. Negative for daily sunscreen use.   Hematologic/Lymphatic: Does not bruise/bleed easily.      Objective:    Physical Exam   Constitutional: He appears well-developed and well-nourished. No distress.   Neurological: He is alert and oriented to person, place, and time. He is not disoriented.   Psychiatric: He has a normal mood and affect.   Skin:   Areas Examined (abnormalities noted in diagram):   Nails and Digits Inspection Performed                Diagram Legend     Erythematous scaling macule/papule c/w actinic keratosis       Vascular papule c/w angioma      Pigmented verrucoid papule/plaque c/w seborrheic keratosis      Yellow umbilicated papule c/w sebaceous hyperplasia      Irregularly shaped tan macule c/w lentigo     1-2 mm smooth white papules consistent with Milia      Movable subcutaneous cyst with punctum c/w epidermal inclusion cyst      Subcutaneous movable cyst c/w pilar cyst      Firm pink to brown papule c/w dermatofibroma      Pedunculated fleshy papule(s) c/w skin tag(s)      Evenly pigmented macule c/w junctional nevus     Mildly variegated pigmented, slightly irregular-bordered macule c/w mildly atypical nevus      Flesh colored to evenly pigmented papule c/w intradermal nevus       Pink pearly  papule/plaque c/w basal cell carcinoma      Erythematous hyperkeratotic cursted plaque c/w SCC      Surgical scar with no sign of skin cancer recurrence      Open and closed comedones      Inflammatory papules and pustules      Verrucoid papule consistent consistent with wart     Erythematous eczematous patches and plaques     Dystrophic onycholytic nail with subungual debris c/w onychomycosis     Umbilicated papule    Erythematous-base heme-crusted tan verrucoid plaque consistent with inflamed seborrheic keratosis     Erythematous Silvery Scaling Plaque c/w Psoriasis     See annotation      Assessment / Plan:        Wart of hand  Start OtC 40% salicylic acid pads to area once daily    Cryosurgery procedure note:    Verbal consent from the patient is obtained including, but not limited to, risk of hypopigmentation/hyperpigmentation, scar, recurrence of lesion. Liquid nitrogen cryosurgery is applied to 1 verruca with prior paring, as detailed in the physical exam, to produce a freeze injury. 3 consecutive freeze thaw cycles are applied to each verruca. The patient is aware that blisters (possibly blood blisters) may form.    Cantharidin procedure note:  Cantharidin placed in office on 1 lesions on patient's left hand. Discussed areas treated should be washed off in 4 hours or sooner should blisters develop. Blisters should resolve in 1 week. A dark or white spot may occur in areas treated. This is the skin's response to irritation and should resolve with time.   Brochure was provided.    Procedure note for Candida Antigen injection:    Discussed risks of procedure including local redness, swelling, and lymph node enlargement. Verbal consent obtained. Area cleaned with alcohol. 0.3cc of Candida antigen injected intradermally at the base of the verruca. Patient tolerated well.   This was patient's 1st  cycle of Candida Antigen.     NDC for Candida Antigen: 52612-767-54        Folliculitis  -     clindamycin (CLEOCIN T)  1 % external solution; Apply solution to back daily after showering  Dispense: 60 mL; Refill: 5    - start otc benzyl peroxide wash to back nightly, scars are result of folliculitis         No follow-ups on file.

## 2023-03-27 NOTE — PATIENT INSTRUCTIONS
Folliculitis  -     clindamycin (CLEOCIN T) 1 % external solution; Apply solution to back daily after showering  Dispense: 60 mL; Refill: 5    - start otc benzyl peroxide wash to back nightly, scars are result of folliculitis    For wart  Start OtC 40% salicylic acid pads to area once daily  Let medication (cantherone) sit for at least 4 hrs before rinsing, lesion may blister, cover with bandaids. The serum inside the wart is contagious.      CRYOSURGERY      Your doctor has used a method called cryosurgery to treat your skin condition. Cryosurgery refers to the use of very cold substances to treat a variety of skin conditions such as warts, pre-skin cancers, molluscum contagiosum, sun spots, and several benign growths. The substance we use in cryosurgery is liquid nitrogen and is so cold (-195 degrees Celsius) that is burns when administered.     Following treatment in the office, the skin may immediately burn and become red. You may find the area around the lesion is affected as well. It is sometimes necessary to treat not only the lesion, but a small area of the surrounding normal skin to achieve a good response.     A blister, and even a blood filled blister, may form after treatment.   This is a normal response. If the blister is painful, it is acceptable to sterilize a needle and with rubbing alcohol and gently pop the blister. It is important that you gently wash the area with soap and warm water as the blister fluid may contain wart virus if a wart was treated. Do no remove the roof of the blister.     The area treated can take anywhere from 1-3 weeks to heal. Healing time depends on the kind skin lesion treated, the location, and how aggressively the lesion was treated. It is recommended that the areas treated are covered with Vaseline or bacitracin ointment and a band-aid. If a band-aid is not practical, just ointment applied several times per day will do. Keeping these areas moist will speed the healing  time.    Treatment with liquid nitrogen can leave a scar. In dark skin, it may be a light or dark scar, in light skin it may be a white or pink scar. These will generally fade with time, but may never go away completely.     If you have any concerns after your treatment, please feel free to call the office.       [facility] [place] [address]/ [phone] FAX/ [email or web address]

## 2023-04-10 ENCOUNTER — PATIENT MESSAGE (OUTPATIENT)
Dept: FAMILY MEDICINE | Facility: CLINIC | Age: 33
End: 2023-04-10
Payer: COMMERCIAL

## 2023-04-11 DIAGNOSIS — E66.01 SEVERE OBESITY: ICD-10-CM

## 2023-04-11 DIAGNOSIS — E88.819 INSULIN RESISTANCE: ICD-10-CM

## 2023-04-11 DIAGNOSIS — K76.0 FATTY LIVER: ICD-10-CM

## 2023-04-11 RX ORDER — SEMAGLUTIDE 1.34 MG/ML
0.25 INJECTION, SOLUTION SUBCUTANEOUS
Qty: 1 EACH | Refills: 0 | Status: SHIPPED | OUTPATIENT
Start: 2023-04-11 | End: 2023-04-17

## 2023-04-17 ENCOUNTER — PATIENT MESSAGE (OUTPATIENT)
Dept: FAMILY MEDICINE | Facility: CLINIC | Age: 33
End: 2023-04-17
Payer: COMMERCIAL

## 2023-04-17 DIAGNOSIS — E66.01 SEVERE OBESITY: ICD-10-CM

## 2023-04-17 DIAGNOSIS — E88.819 INSULIN RESISTANCE: Primary | ICD-10-CM

## 2023-04-17 DIAGNOSIS — K76.0 FATTY LIVER: ICD-10-CM

## 2023-04-17 RX ORDER — SEMAGLUTIDE 0.68 MG/ML
0.5 INJECTION, SOLUTION SUBCUTANEOUS
Qty: 3 ML | Refills: 0 | Status: SHIPPED | OUTPATIENT
Start: 2023-04-17 | End: 2023-06-13

## 2023-04-25 ENCOUNTER — PATIENT MESSAGE (OUTPATIENT)
Dept: FAMILY MEDICINE | Facility: CLINIC | Age: 33
End: 2023-04-25
Payer: COMMERCIAL

## 2023-04-25 NOTE — TELEPHONE ENCOUNTER
Spoke with patient on the phone- advised him I do not any appts after 3pm that week- patient states he will keep appt as is.

## 2023-04-30 ENCOUNTER — PATIENT MESSAGE (OUTPATIENT)
Dept: OTOLARYNGOLOGY | Facility: CLINIC | Age: 33
End: 2023-04-30
Payer: COMMERCIAL

## 2023-05-01 ENCOUNTER — OFFICE VISIT (OUTPATIENT)
Dept: FAMILY MEDICINE | Facility: CLINIC | Age: 33
End: 2023-05-01
Payer: COMMERCIAL

## 2023-05-01 ENCOUNTER — PATIENT MESSAGE (OUTPATIENT)
Dept: OTOLARYNGOLOGY | Facility: CLINIC | Age: 33
End: 2023-05-01
Payer: COMMERCIAL

## 2023-05-01 VITALS
TEMPERATURE: 98 F | BODY MASS INDEX: 33 KG/M2 | WEIGHT: 243.63 LBS | DIASTOLIC BLOOD PRESSURE: 72 MMHG | SYSTOLIC BLOOD PRESSURE: 120 MMHG | HEART RATE: 70 BPM | OXYGEN SATURATION: 98 % | HEIGHT: 72 IN

## 2023-05-01 DIAGNOSIS — F41.9 ANXIETY: ICD-10-CM

## 2023-05-01 DIAGNOSIS — Z00.00 ANNUAL PHYSICAL EXAM: Primary | ICD-10-CM

## 2023-05-01 DIAGNOSIS — H91.91 DEAFNESS IN RIGHT EAR: ICD-10-CM

## 2023-05-01 DIAGNOSIS — J45.30 MILD PERSISTENT ASTHMA WITHOUT COMPLICATION: ICD-10-CM

## 2023-05-01 DIAGNOSIS — E66.09 CLASS 1 OBESITY DUE TO EXCESS CALORIES WITHOUT SERIOUS COMORBIDITY WITH BODY MASS INDEX (BMI) OF 33.0 TO 33.9 IN ADULT: ICD-10-CM

## 2023-05-01 DIAGNOSIS — Z96.21 STATUS POST PLACEMENT OF BONE ANCHORED HEARING AID (BAHA): ICD-10-CM

## 2023-05-01 DIAGNOSIS — K76.0 FATTY LIVER: ICD-10-CM

## 2023-05-01 DIAGNOSIS — H90.6 MIXED CONDUCTIVE AND SENSORINEURAL HEARING LOSS OF BOTH EARS: ICD-10-CM

## 2023-05-01 DIAGNOSIS — F63.81 INTERMITTENT EXPLOSIVE DISORDER: ICD-10-CM

## 2023-05-01 DIAGNOSIS — E88.819 INSULIN RESISTANCE: ICD-10-CM

## 2023-05-01 DIAGNOSIS — G47.33 OSA ON CPAP: ICD-10-CM

## 2023-05-01 PROBLEM — E66.811 CLASS 1 OBESITY DUE TO EXCESS CALORIES WITHOUT SERIOUS COMORBIDITY WITH BODY MASS INDEX (BMI) OF 33.0 TO 33.9 IN ADULT: Status: ACTIVE | Noted: 2020-05-07

## 2023-05-01 PROCEDURE — 1159F MED LIST DOCD IN RCRD: CPT | Mod: CPTII,S$GLB,, | Performed by: NURSE PRACTITIONER

## 2023-05-01 PROCEDURE — 99395 PREV VISIT EST AGE 18-39: CPT | Mod: S$GLB,,, | Performed by: NURSE PRACTITIONER

## 2023-05-01 PROCEDURE — 3044F PR MOST RECENT HEMOGLOBIN A1C LEVEL <7.0%: ICD-10-PCS | Mod: CPTII,S$GLB,, | Performed by: NURSE PRACTITIONER

## 2023-05-01 PROCEDURE — 99999 PR PBB SHADOW E&M-EST. PATIENT-LVL IV: ICD-10-PCS | Mod: PBBFAC,,, | Performed by: NURSE PRACTITIONER

## 2023-05-01 PROCEDURE — 3044F HG A1C LEVEL LT 7.0%: CPT | Mod: CPTII,S$GLB,, | Performed by: NURSE PRACTITIONER

## 2023-05-01 PROCEDURE — 3008F PR BODY MASS INDEX (BMI) DOCUMENTED: ICD-10-PCS | Mod: CPTII,S$GLB,, | Performed by: NURSE PRACTITIONER

## 2023-05-01 PROCEDURE — 99999 PR PBB SHADOW E&M-EST. PATIENT-LVL IV: CPT | Mod: PBBFAC,,, | Performed by: NURSE PRACTITIONER

## 2023-05-01 PROCEDURE — 3078F PR MOST RECENT DIASTOLIC BLOOD PRESSURE < 80 MM HG: ICD-10-PCS | Mod: CPTII,S$GLB,, | Performed by: NURSE PRACTITIONER

## 2023-05-01 PROCEDURE — 1159F PR MEDICATION LIST DOCUMENTED IN MEDICAL RECORD: ICD-10-PCS | Mod: CPTII,S$GLB,, | Performed by: NURSE PRACTITIONER

## 2023-05-01 PROCEDURE — 3008F BODY MASS INDEX DOCD: CPT | Mod: CPTII,S$GLB,, | Performed by: NURSE PRACTITIONER

## 2023-05-01 PROCEDURE — 3074F PR MOST RECENT SYSTOLIC BLOOD PRESSURE < 130 MM HG: ICD-10-PCS | Mod: CPTII,S$GLB,, | Performed by: NURSE PRACTITIONER

## 2023-05-01 PROCEDURE — 1160F PR REVIEW ALL MEDS BY PRESCRIBER/CLIN PHARMACIST DOCUMENTED: ICD-10-PCS | Mod: CPTII,S$GLB,, | Performed by: NURSE PRACTITIONER

## 2023-05-01 PROCEDURE — 99395 PR PREVENTIVE VISIT,EST,18-39: ICD-10-PCS | Mod: S$GLB,,, | Performed by: NURSE PRACTITIONER

## 2023-05-01 PROCEDURE — 1160F RVW MEDS BY RX/DR IN RCRD: CPT | Mod: CPTII,S$GLB,, | Performed by: NURSE PRACTITIONER

## 2023-05-01 PROCEDURE — 3074F SYST BP LT 130 MM HG: CPT | Mod: CPTII,S$GLB,, | Performed by: NURSE PRACTITIONER

## 2023-05-01 PROCEDURE — 3078F DIAST BP <80 MM HG: CPT | Mod: CPTII,S$GLB,, | Performed by: NURSE PRACTITIONER

## 2023-05-01 RX ORDER — FLUOXETINE HYDROCHLORIDE 40 MG/1
40 CAPSULE ORAL DAILY
Qty: 90 CAPSULE | Refills: 1 | Status: SHIPPED | OUTPATIENT
Start: 2023-05-01 | End: 2023-10-05 | Stop reason: DRUGHIGH

## 2023-05-01 RX ORDER — FLUOXETINE HYDROCHLORIDE 40 MG/1
40 CAPSULE ORAL DAILY
Qty: 30 CAPSULE | Refills: 1 | Status: CANCELLED | OUTPATIENT
Start: 2023-05-01

## 2023-05-01 NOTE — PROGRESS NOTES
Subjective:       Patient ID: Marshal Baer is a 32 y.o. male.    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        Mixed Conductive and Sensorineural hearing loss/ Bone Anchored hearing aide right side.  Followed by ENT  States he has some swelling to site for past couple weeks - reports mildly tender  Patient sent message to his specialist.  See pictures below.     BPH  Followed by [facility] NP Madhav   Reports symptoms have resolved.     Small hiatal hernia with chronic GERD  Followed by [facility]     Fatty Liver  03/23/2022 CT abdomen pelvis:  Diffuse hepatic steatosis similar to prior exams.  7/10/2018 hepatitis C negative     Obstructive Sleep Apnea  Reports sleep studies done and sent his results to [facility]  Supposed to be CPAP nightly but not using since weight loss.    Obesity  Body mass index is 33.36 kg/m².  Seeing Dr. Carrillo for weight loss  Started on Ozempic in March 2023  Lost 20 pounds in past 2 months  Lost 35 pounds in past 5 months.  Currently taking Ozempic 0.5 mg injection weekly      Wellness Labs:  CBC  WNL  CMP WNL  Cholesterol much improved with weight loss  HgbA1C 4.9%  TSH WNL    Health Maintenance:  Declined covid vaccine  Rest up to date.    Component      Latest Ref Rng & Units 4/27/2023 2/9/2023 4/8/2022 3/23/2022                WBC      3.90 - 12.70 K/uL 6.04 7.01 7.45 6.41   RBC      4.60 - 6.20 M/uL 5.71 5.36 5.48 5.34   Hemoglobin      14.0 - 18.0 g/dL 16.5 15.5 15.6 15.3   Hematocrit      40.0 - 54.0 % 49.4 44.7 47.3 46.0   MCV      82 - 98 fL 87 83 86 86   MCH      27.0 - 31.0 pg 28.9 28.9 28.5 28.7   MCHC      32.0 - 36.0 g/dL 33.4 34.7 33.0 33.3   RDW      11.5 - 14.5 % 12.7 12.3 12.3 12.2   Platelets      150 - 450 K/uL 197 205 193 194   MPV      9.2 - 12.9 fL 11.0 10.5 10.8 10.3   Immature Granulocytes      0.0 - 0.5 % 0.2 0.4  0.5   Gran # (ANC)      1.8 - 7.7 K/uL 3.4 3.4  3.9   Immature Grans (Abs)      0.00 - 0.04 K/uL 0.01 0.03  0.03   Lymph #      1.0 - 4.8 K/uL 1.7 2.4  1.7   Mono #      0.3 - 1.0 K/uL 0.6 0.8  0.4   Eos #      0.0 - 0.5 K/uL 0.3 0.4  0.3   Baso #      0.00 - 0.20 K/uL 0.04 0.04  0.04   nRBC      0 /100 WBC 0 0  0   Gran %      38.0 - 73.0 % 56.8 47.8  60.3   Lymph %      18.0 - 48.0 % 28.1 34.7  27.0   Mono %      4.0 - 15.0 % 9.9 11.4  6.9   Eosinophil %      0.0 - 8.0 % 4.3 5.1  4.7   Basophil %      0.0 - 1.9 % 0.7 0.6  0.6   Differential Method       Automated Automated  Automated   Sodium      136 - 145 mmol/L 140 141 144 144   Potassium      3.5 - 5.1 mmol/L 4.6 3.8 4.1 4.5   Chloride      95 - 110 mmol/L 105 105 108 108   CO2      23 - 29 mmol/L 29 27 28 26   Glucose      70 - 110 mg/dL 95 87 100 101   BUN      2 - 20 mg/dL 18 16 16 16   Creatinine      0.50 - 1.40 mg/dL 0.82 0.87 0.87 0.84   Calcium      8.7 - 10.5 mg/dL 9.1 8.8 8.6 (L) 9.0   PROTEIN TOTAL      6.0 - 8.4 g/dL 7.8 7.5 7.5 7.7   Albumin      3.5 - 5.2 g/dL 4.7 4.7 4.5 4.7   BILIRUBIN TOTAL      0.1 - 1.0 mg/dL 0.7 0.4 0.4 0.4   Alkaline Phosphatase      38 - 126 U/L 74 74 79 74   AST      15 - 46 U/L  35 37 34 44   ALT      10 - 44 U/L 39 46 (H) 44 68 (H)   Anion Gap      8 - 16 mmol/L 6 (L) 9 8 10   eGFR      >60 mL/min/1.73 m:2 >60.0 >60.0     Cholesterol      120 - 199 mg/dL 158  209 (H)    Triglycerides      30 - 150 mg/dL 60  247 (H)    HDL      40 - 75 mg/dL 45  40    LDL Cholesterol External      63.0 - 159.0 mg/dL 101.0  119.6    HDL/Cholesterol Ratio      20.0 - 50.0 % 28.5  19.1 (L)    Total Cholesterol/HDL Ratio      2.0 - 5.0 3.5  5.2 (H)    Non-HDL Cholesterol      mg/dL 113  169    Hemoglobin A1C External      4.0 - 5.6 % 4.9      Estimated Avg Glucose      68 - 131 mg/dL 94      TSH      0.400 - 4.000 uIU/mL 0.721          Review of Systems   Constitutional:  Negative for activity change, appetite change, fatigue, fever and unexpected weight change.   HENT:  Negative for congestion, ear pain, mouth sores, nosebleeds, postnasal drip, rhinorrhea, sinus pressure, sneezing, sore throat, trouble swallowing and voice change.    Eyes: Negative.    Respiratory:  Negative for cough, chest tightness and shortness of breath.    Cardiovascular:  Negative for chest pain, palpitations and leg swelling.   Gastrointestinal: Negative.  Negative for abdominal pain, blood in stool, constipation, diarrhea, nausea and vomiting.   Endocrine: Negative.    Genitourinary:  Negative for difficulty urinating, dysuria, flank pain, hematuria and urgency.   Musculoskeletal:  Negative for arthralgias, back pain, gait problem, joint swelling, myalgias and neck pain.   Skin:  Negative for color change, rash and wound.   Allergic/Immunologic: Negative for immunocompromised state.   Neurological:  Negative for dizziness, tremors, seizures, syncope, speech difficulty and headaches.   Hematological:  Negative for adenopathy. Does not bruise/bleed easily.   Psychiatric/Behavioral:  Negative for behavioral problems, dysphoric mood, sleep disturbance and suicidal ideas. The patient is not nervous/anxious.        Objective:     Vitals:     "05/01/23 0809   BP: 120/72   BP Location: Left arm   Patient Position: Sitting   BP Method: Large (Manual)   Pulse: 70   Temp: 97.9 °F (36.6 °C)   TempSrc: Temporal   SpO2: 98%   Weight: 110.5 kg (243 lb 9.7 oz)   Height: 5' 11.65" (1.82 m)          Physical Exam  Constitutional:       General: He is not in acute distress.     Appearance: Normal appearance. He is obese. He is not toxic-appearing or diaphoretic.      Comments: Body mass index is 33.36 kg/m².       HENT:      Head: Normocephalic and atraumatic.      Right Ear: Tympanic membrane, ear canal and external ear normal.      Left Ear: Tympanic membrane, ear canal and external ear normal.      Ears:      Comments: Hearing loss both ears - has bone-anchored hearing aide to right ear. Mild swelling to site of bone anchor with mild tenderness present.  NO redness, no hotness to touch     Nose: No congestion.      Mouth/Throat:      Pharynx: No oropharyngeal exudate or posterior oropharyngeal erythema.   Eyes:      General:         Right eye: No discharge.         Left eye: No discharge.      Extraocular Movements: Extraocular movements intact.      Conjunctiva/sclera: Conjunctivae normal.   Cardiovascular:      Rate and Rhythm: Normal rate and regular rhythm.      Heart sounds: Normal heart sounds.   Pulmonary:      Effort: Pulmonary effort is normal. No respiratory distress.      Breath sounds: Normal breath sounds. No stridor. No wheezing or rales.   Abdominal:      General: There is no distension.   Musculoskeletal:         General: Normal range of motion.   Skin:     General: Skin is warm and dry.   Neurological:      Mental Status: He is alert and oriented to person, place, and time.   Psychiatric:         Mood and Affect: Mood normal.         Behavior: Behavior normal.         Thought Content: Thought content normal.         Judgment: Judgment normal.               Assessment:         ICD-10-CM ICD-9-CM   1. Annual physical exam  Z00.00 V70.0   2. Anxiety  " F41.9 300.00   3. Intermittent explosive disorder  F63.81 312.34   4. Mild persistent asthma without complication  J45.30 493.90   5. Deafness in right ear  H91.91 389.9   6. Status post placement of bone anchored hearing aid (BAHA)  Z96.21 V45.89   7. Mixed conductive and sensorineural hearing loss of both ears  H90.6 389.22   8. TED on CPAP  G47.33 327.23    Z99.89 V46.8   9. Fatty liver  K76.0 571.8   10. Class 1 obesity due to excess calories without serious comorbidity with body mass index (BMI) of 33.0 to 33.9 in adult  E66.09 278.00    Z68.33 V85.33   11. Insulin resistance  E88.81 277.7       Plan:       Annual physical exam    Health Maintenance Summary     Full History      Expand All  Collapse All    Postponed - COVID-19 Vaccine  (1)  Postponed until 11/1/2023  No completion history exists for this topic.     Influenza Vaccine  (Season Ended)  Next due on 9/1/2023 01/12/2022  Imm Admin: Influenza - Quadrivalent - PF *Preferred* (6 months and older)    12/12/2020  Imm Admin: Influenza    09/12/2020  Imm Admin: Influenza - Quadrivalent - PF *Preferred* (6 months and older)    11/06/2019  Imm Admin: Influenza    10/28/2019  Imm Admin: Influenza - Quadrivalent    View More History     TETANUS VACCINE  (Every 10 Years)  Next due on 6/22/2027 06/22/2017  Imm Admin: Tdap    06/20/2017  Done - [facility]    10/06/2000  Imm Admin: Td (ADULT)      Hepatitis C Screening  Completed  07/10/2018  Hepatitis C Ab component of HEPATITIS PANEL, ACUTE      HIV Screening  Completed  04/08/2022  HIV 1/2 Ag/Ab (4th Gen)      Pneumococcal Vaccines (Age 0-64)  (Series Information)  Completed  09/15/2022  Imm Admin: Pneumococcal Conjugate - 20 Valent      Lipid Panel  Completed  04/27/2023  Lipid Panel    04/08/2022  Lipid Panel    01/02/2021  Lipid Panel    07/05/2018  Lipid panel    05/05/2015  Lipid panel        Anxiety  Continue current medication(s).  Follow up in 6 months.  -     FLUoxetine 40 MG capsule; Take 1 capsule (40  mg total) by mouth once daily.  Dispense: 90 capsule; Refill: 1    Intermittent explosive disorder  Continue current medication(s).  Follow up in 6 months.    Mild persistent asthma without complication  Continue current medication(s).  Follow up in 6 months.    Deafness in right ear  Followed by specialist    Status post placement of bone anchored hearing aid (BAHA)  Has mild swelling and irritation to site - patient sent message to the specialist -     Mixed conductive and sensorineural hearing loss of both ears  Condition followed/treated by Specialist.  Please follow up with Specialist as advised.    TED on CPAP  Stopped use with weight loss  Advised he should not stop use unless he has testing to say TED is no longer present     Fatty liver  Working on weight loss    Class 1 obesity due to excess calories without serious comorbidity with body mass index (BMI) of 33.0 to 33.9 in adult  On ozempic with Dr. Carrillo    Insulin resistance  On ozempic with Dr. Carrillo    Follow up in about 6 months (around 11/1/2023) for medicine check.     Patient's Medications   New Prescriptions    No medications on file   Previous Medications    ALBUTEROL (ACCUNEB) 0.63 MG/3 ML NEBU    Take 3 mLs (0.63 mg total) by nebulization every 6 (six) hours as needed (wheezing). Rescue    CLINDAMYCIN (CLEOCIN T) 1 % EXTERNAL SOLUTION    Apply solution to back daily after showering    FLUTICASONE PROPION-SALMETEROL 115-21 MCG/DOSE (ADVAIR HFA) 115-21 MCG/ACTUATION HFAA INHALER    Inhale 2 puffs into the lungs every 12 (twelve) hours. Controller    MONTELUKAST (SINGULAIR) 10 MG TABLET    Take 1 tablet (10 mg total) by mouth every evening.    PANTOPRAZOLE (PROTONIX) 40 MG TABLET    Take 1 tablet (40 mg total) by mouth once daily.    SEMAGLUTIDE (OZEMPIC) 0.25 MG OR 0.5 MG(2 MG/1.5 ML) PEN INJECTOR    Inject 0.5 mg into the skin every 7 days.   Modified Medications    Modified Medication Previous Medication    FLUOXETINE 40 MG CAPSULE  FLUoxetine 40 MG capsule       Take 1 capsule (40 mg total) by mouth once daily.    Take 1 capsule (40 mg total) by mouth once daily.   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    Benign prostatic hyperplasia with urinary frequency 12/10/2020    Class 2 obesity due to excess calories without serious comorbidity with body mass index (BMI) of 35.0 to 35.9 in adult 05/07/2020    - discussed recommendation for diet and cardiovascular exercise - counseling on lifestyle modifications for risk factor reduction    Deafness in right ear     since birth    Elevated LFTs 01/04/2021    Lab Results Component Value Date  ALT 44 04/08/2022  AST 34 04/08/2022  ALKPHOS 79 04/08/2022  BILITOT 0.4 04/08/2022  - 7/10/2018 hepatitis C negative - 3/23/22 CT abd/pelvis: fatty liver disease normal - likely related with diet and lifestyle - pt denies alcohol use - discussed recommendation for diet and cardiovascular exercise - counseling on lifestyle modifications for risk factor reduction -    Epididymal cyst 04/19/2021    Noted on u/s dated 8/17/2016.     Fatty liver 04/12/2022    - 03/23/2022 CT abdomen pelvis:  Diffuse hepatic steatosis similar to prior exams. - 7/10/2018 hepatitis C negative Lab Results Component Value Date  ALT 44 04/08/2022  AST 34 04/08/2022  ALKPHOS 79 04/08/2022  BILITOT 0.4 04/08/2022  - discussed recommendation for diet and cardiovascular exercise - counseling on lifestyle modifications for risk factor reduction    Gastroesophageal reflux disease without esophagitis 01/30/2020    - followed by gastroenterology - 08/07/2020 EGD:  Small hiatal hernia.  Negative H pylori.  No dysplasia.  No esophagitis    Hiatal hernia with gastroesophageal reflux disease and esophagitis     History of peptic ulcer 07/10/2020     Intermittent explosive disorder 01/31/2020    Varicocele 08/17/2016       Past Surgical History:   Procedure Laterality Date    COLONOSCOPY N/A 1/12/2021    Procedure: COLONOSCOPY;  Surgeon: Valentina Patton MD;  Location: [facility] ENDO;  Service: Endoscopy;  Laterality: N/A;    ENDOSCOPY  11/2019    ESOPHAGOGASTRODUODENOSCOPY N/A 8/7/2020    Procedure: EGD (ESOPHAGOGASTRODUODENOSCOPY);  Surgeon: Valentina Patton MD;  Location: [facility] ENDO;  Service: Endoscopy;  Laterality: N/A;    IMPLANTATION OF BONE ANCHORED HEARING AID (BAHA) Right 11/21/2022    Procedure: INSERTION, BAHA;  Surgeon: Edu Mistry MD;  Location: [facility] OR [address] [place] [address];  Service: ENT;  Laterality: Right;    VASECTOMY  12/27/2019       Family History   Problem Relation Age of Onset    Heart disease Mother         aortic calcifications    Hyperlipidemia Mother     Diabetes Mother     Cancer Mother         lung cancer 55; thyroid cancer 55, colon cancer 55, lesion on the liver    Hypertension Mother     Lung cancer Mother     Hyperlipidemia Father     Heart disease Father         MI - age 30 - stents placed; CHF    Hypertension Father     Diabetes Father     Alcohol abuse Father     Heart disease Paternal Uncle 42        Heart Attack in 1999    Cancer Maternal Grandmother         Breast  cancer 2006    No Known Problems Maternal Grandfather     Prostate cancer Neg Hx     Kidney disease Neg Hx        Social History     Socioeconomic History    Marital status:     Number of children: 2   Occupational History     Employer: [employer]    Occupation: chemical    Tobacco Use    Smoking status: Never     Passive exposure: Past    Smokeless tobacco: Never   Substance and Sexual Activity    Alcohol use: Yes     Comment: rarely    Drug use: No    Sexual activity: Yes     Partners: Female     Social Determinants of Health     Financial Resource Strain: Low Risk     Difficulty of Paying Living Expenses: Not hard at all   Food  Insecurity: No Food Insecurity    Worried About Running Out of Food in the Last Year: Never true    Ran Out of Food in the Last Year: Never true   Transportation Needs: No Transportation Needs    Lack of Transportation (Medical): No    Lack of Transportation (Non-Medical): No   Physical Activity: Insufficiently Active    Days of Exercise per Week: 3 days    Minutes of Exercise per Session: 30 min   Stress: No Stress Concern Present    Feeling of Stress : Not at all   Social Connections: Unknown    Frequency of Communication with Friends and Family: More than three times a week    Frequency of Social Gatherings with Friends and Family: Once a week    Active Member of Clubs or Organizations: No    Attends Club or Organization Meetings: Never    Marital Status:    Housing Stability: Low Risk     Unable to Pay for Housing in the Last Year: No    Number of Places Lived in the Last Year: 1    Unstable Housing in the Last Year: No

## 2023-05-04 ENCOUNTER — PATIENT MESSAGE (OUTPATIENT)
Dept: SLEEP MEDICINE | Facility: CLINIC | Age: 33
End: 2023-05-04
Payer: COMMERCIAL

## 2023-05-12 ENCOUNTER — OFFICE VISIT (OUTPATIENT)
Dept: OTOLARYNGOLOGY | Facility: CLINIC | Age: 33
End: 2023-05-12
Payer: COMMERCIAL

## 2023-05-12 DIAGNOSIS — H90.A22 SENSORINEURAL HEARING LOSS (SNHL) OF LEFT EAR WITH RESTRICTED HEARING OF RIGHT EAR: ICD-10-CM

## 2023-05-12 DIAGNOSIS — H91.92 DEAFNESS IN LEFT EAR: ICD-10-CM

## 2023-05-12 DIAGNOSIS — H90.A31 MIXED CONDUCTIVE AND SENSORINEURAL HEARING LOSS OF RIGHT EAR WITH RESTRICTED HEARING OF LEFT EAR: ICD-10-CM

## 2023-05-12 DIAGNOSIS — L91.0 HYPERTROPHIC SCAR: Primary | ICD-10-CM

## 2023-05-12 PROCEDURE — 96372 THER/PROPH/DIAG INJ SC/IM: CPT | Mod: 59,S$GLB,, | Performed by: OTOLARYNGOLOGY

## 2023-05-12 PROCEDURE — 11900 INJECT SKIN LESIONS </W 7: CPT | Mod: S$GLB,,, | Performed by: OTOLARYNGOLOGY

## 2023-05-12 PROCEDURE — 3044F HG A1C LEVEL LT 7.0%: CPT | Mod: CPTII,S$GLB,, | Performed by: OTOLARYNGOLOGY

## 2023-05-12 PROCEDURE — 99213 PR OFFICE/OUTPT VISIT, EST, LEVL III, 20-29 MIN: ICD-10-PCS | Mod: 25,S$GLB,, | Performed by: OTOLARYNGOLOGY

## 2023-05-12 PROCEDURE — 11900 PR INJECTION INTO SKIN LESIONS, UP TO 7: ICD-10-PCS | Mod: S$GLB,,, | Performed by: OTOLARYNGOLOGY

## 2023-05-12 PROCEDURE — 3044F PR MOST RECENT HEMOGLOBIN A1C LEVEL <7.0%: ICD-10-PCS | Mod: CPTII,S$GLB,, | Performed by: OTOLARYNGOLOGY

## 2023-05-12 PROCEDURE — 99999 PR PBB SHADOW E&M-EST. PATIENT-LVL III: ICD-10-PCS | Mod: PBBFAC,,, | Performed by: OTOLARYNGOLOGY

## 2023-05-12 PROCEDURE — 96372 PR INJECTION,THERAP/PROPH/DIAG2ST, IM OR SUBCUT: ICD-10-PCS | Mod: 59,S$GLB,, | Performed by: OTOLARYNGOLOGY

## 2023-05-12 PROCEDURE — 99999 PR PBB SHADOW E&M-EST. PATIENT-LVL III: CPT | Mod: PBBFAC,,, | Performed by: OTOLARYNGOLOGY

## 2023-05-12 PROCEDURE — 1159F MED LIST DOCD IN RCRD: CPT | Mod: CPTII,S$GLB,, | Performed by: OTOLARYNGOLOGY

## 2023-05-12 PROCEDURE — 1159F PR MEDICATION LIST DOCUMENTED IN MEDICAL RECORD: ICD-10-PCS | Mod: CPTII,S$GLB,, | Performed by: OTOLARYNGOLOGY

## 2023-05-12 PROCEDURE — 99213 OFFICE O/P EST LOW 20 MIN: CPT | Mod: 25,S$GLB,, | Performed by: OTOLARYNGOLOGY

## 2023-05-12 RX ORDER — TRIAMCINOLONE ACETONIDE 40 MG/ML
40 INJECTION, SUSPENSION INTRA-ARTICULAR; INTRAMUSCULAR
Status: COMPLETED | OUTPATIENT
Start: 2023-05-12 | End: 2023-05-12

## 2023-05-12 RX ADMIN — TRIAMCINOLONE ACETONIDE 40 MG: 40 INJECTION, SUSPENSION INTRA-ARTICULAR; INTRAMUSCULAR at 10:05

## 2023-05-12 NOTE — PROGRESS NOTES
Subjective     Patient ID: Marshal Baer is a 33 y.o. male.    Chief Complaint: Follow-up    Follow-up       Marshal Baer is a 33 y.o. 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  He is status post bone conducting implant, cochlear osia device in 2022.  He presents today for increased pain at the incision site.  Denies any drainage.  Denies pain at the magnet site.  No fevers.    Review of Systems   Constitutional: Negative.    HENT: Negative.     Eyes: Negative.    Respiratory: Negative.     Cardiovascular: Negative.    Gastrointestinal: Negative.    Endocrine: Negative.    Genitourinary: Negative.    Musculoskeletal: Negative.    Integumentary:  Negative.   Allergic/Immunologic: Negative.    Neurological: Negative.    Hematological: Negative.    Psychiatric/Behavioral: Negative.          Objective     Physical Exam  Vitals and nursing note reviewed.   Constitutional:       Appearance: Normal appearance.   HENT:      Head: Normocephalic and atraumatic.        Right Ear: Tympanic membrane, ear canal and external ear normal. There is no impacted cerumen.      Left Ear: Tympanic membrane, ear canal and external ear normal. There is no impacted cerumen.   Neurological:      Mental Status: He is alert.     Procedure:  Steroid injection to hypertrophic scar   Details:  1 cc of 40 milligrams/mL of Kenalog placed into a syringe and injected using a 30 gauge needle into the scar.  This was done multiple times.  Approximately 0.3 mL injected     Assessment and Plan     Problem List Items Addressed This Visit    None  Visit Diagnoses       Hypertrophic scar    -  Primary    Mixed conductive and sensorineural hearing loss of right ear with restricted hearing of left ear        Sensorineural hearing loss (SNHL) of left ear with restricted hearing of right ear        Deafness in left ear                Pain at scar with some hypertrophy.    Injected with kenalog  F/u in 6 weeks

## 2023-06-12 DIAGNOSIS — E88.819 INSULIN RESISTANCE: ICD-10-CM

## 2023-06-12 DIAGNOSIS — E66.01 SEVERE OBESITY: ICD-10-CM

## 2023-06-12 DIAGNOSIS — K76.0 FATTY LIVER: ICD-10-CM

## 2023-06-12 RX ORDER — SEMAGLUTIDE 0.68 MG/ML
0.5 INJECTION, SOLUTION SUBCUTANEOUS
Qty: 3 ML | Refills: 0 | Status: CANCELLED | OUTPATIENT
Start: 2023-06-12 | End: 2024-06-11

## 2023-06-13 RX ORDER — SEMAGLUTIDE 1.34 MG/ML
1 INJECTION, SOLUTION SUBCUTANEOUS
Qty: 9 ML | Refills: 3 | Status: SHIPPED | OUTPATIENT
Start: 2023-06-13 | End: 2023-10-05

## 2023-06-22 ENCOUNTER — OFFICE VISIT (OUTPATIENT)
Dept: FAMILY MEDICINE | Facility: CLINIC | Age: 33
End: 2023-06-22
Payer: COMMERCIAL

## 2023-06-22 VITALS
SYSTOLIC BLOOD PRESSURE: 110 MMHG | DIASTOLIC BLOOD PRESSURE: 72 MMHG | WEIGHT: 220.88 LBS | HEART RATE: 72 BPM | BODY MASS INDEX: 29.92 KG/M2 | HEIGHT: 72 IN | TEMPERATURE: 98 F | OXYGEN SATURATION: 98 %

## 2023-06-22 DIAGNOSIS — E66.09 CLASS 1 OBESITY DUE TO EXCESS CALORIES WITHOUT SERIOUS COMORBIDITY WITH BODY MASS INDEX (BMI) OF 30.0 TO 30.9 IN ADULT: Primary | ICD-10-CM

## 2023-06-22 DIAGNOSIS — S29.012A STRAIN OF LEFT RHOMBOID MUSCLE: ICD-10-CM

## 2023-06-22 PROCEDURE — 3074F SYST BP LT 130 MM HG: CPT | Mod: CPTII,S$GLB,, | Performed by: STUDENT IN AN ORGANIZED HEALTH CARE EDUCATION/TRAINING PROGRAM

## 2023-06-22 PROCEDURE — 3078F DIAST BP <80 MM HG: CPT | Mod: CPTII,S$GLB,, | Performed by: STUDENT IN AN ORGANIZED HEALTH CARE EDUCATION/TRAINING PROGRAM

## 2023-06-22 PROCEDURE — 3008F PR BODY MASS INDEX (BMI) DOCUMENTED: ICD-10-PCS | Mod: CPTII,S$GLB,, | Performed by: STUDENT IN AN ORGANIZED HEALTH CARE EDUCATION/TRAINING PROGRAM

## 2023-06-22 PROCEDURE — 3074F PR MOST RECENT SYSTOLIC BLOOD PRESSURE < 130 MM HG: ICD-10-PCS | Mod: CPTII,S$GLB,, | Performed by: STUDENT IN AN ORGANIZED HEALTH CARE EDUCATION/TRAINING PROGRAM

## 2023-06-22 PROCEDURE — 3044F HG A1C LEVEL LT 7.0%: CPT | Mod: CPTII,S$GLB,, | Performed by: STUDENT IN AN ORGANIZED HEALTH CARE EDUCATION/TRAINING PROGRAM

## 2023-06-22 PROCEDURE — 3044F PR MOST RECENT HEMOGLOBIN A1C LEVEL <7.0%: ICD-10-PCS | Mod: CPTII,S$GLB,, | Performed by: STUDENT IN AN ORGANIZED HEALTH CARE EDUCATION/TRAINING PROGRAM

## 2023-06-22 PROCEDURE — 3078F PR MOST RECENT DIASTOLIC BLOOD PRESSURE < 80 MM HG: ICD-10-PCS | Mod: CPTII,S$GLB,, | Performed by: STUDENT IN AN ORGANIZED HEALTH CARE EDUCATION/TRAINING PROGRAM

## 2023-06-22 PROCEDURE — 1159F PR MEDICATION LIST DOCUMENTED IN MEDICAL RECORD: ICD-10-PCS | Mod: CPTII,S$GLB,, | Performed by: STUDENT IN AN ORGANIZED HEALTH CARE EDUCATION/TRAINING PROGRAM

## 2023-06-22 PROCEDURE — 1160F PR REVIEW ALL MEDS BY PRESCRIBER/CLIN PHARMACIST DOCUMENTED: ICD-10-PCS | Mod: CPTII,S$GLB,, | Performed by: STUDENT IN AN ORGANIZED HEALTH CARE EDUCATION/TRAINING PROGRAM

## 2023-06-22 PROCEDURE — 1159F MED LIST DOCD IN RCRD: CPT | Mod: CPTII,S$GLB,, | Performed by: STUDENT IN AN ORGANIZED HEALTH CARE EDUCATION/TRAINING PROGRAM

## 2023-06-22 PROCEDURE — 1160F RVW MEDS BY RX/DR IN RCRD: CPT | Mod: CPTII,S$GLB,, | Performed by: STUDENT IN AN ORGANIZED HEALTH CARE EDUCATION/TRAINING PROGRAM

## 2023-06-22 PROCEDURE — 99999 PR PBB SHADOW E&M-EST. PATIENT-LVL IV: ICD-10-PCS | Mod: PBBFAC,,, | Performed by: STUDENT IN AN ORGANIZED HEALTH CARE EDUCATION/TRAINING PROGRAM

## 2023-06-22 PROCEDURE — 99214 OFFICE O/P EST MOD 30 MIN: CPT | Mod: S$GLB,,, | Performed by: STUDENT IN AN ORGANIZED HEALTH CARE EDUCATION/TRAINING PROGRAM

## 2023-06-22 PROCEDURE — 3008F BODY MASS INDEX DOCD: CPT | Mod: CPTII,S$GLB,, | Performed by: STUDENT IN AN ORGANIZED HEALTH CARE EDUCATION/TRAINING PROGRAM

## 2023-06-22 PROCEDURE — 99999 PR PBB SHADOW E&M-EST. PATIENT-LVL IV: CPT | Mod: PBBFAC,,, | Performed by: STUDENT IN AN ORGANIZED HEALTH CARE EDUCATION/TRAINING PROGRAM

## 2023-06-22 PROCEDURE — 99214 PR OFFICE/OUTPT VISIT, EST, LEVL IV, 30-39 MIN: ICD-10-PCS | Mod: S$GLB,,, | Performed by: STUDENT IN AN ORGANIZED HEALTH CARE EDUCATION/TRAINING PROGRAM

## 2023-06-22 RX ORDER — TIZANIDINE 4 MG/1
2-4 TABLET ORAL NIGHTLY PRN
Qty: 60 TABLET | Refills: 1 | Status: SHIPPED | OUTPATIENT
Start: 2023-06-22 | End: 2023-10-05

## 2023-06-22 NOTE — PROGRESS NOTES
Subjective:      Patient ID: Marshal Baer is a 33 y.o. male.    Chief Complaint: Follow-up (Pt here for a 3 month follow up. Pt having some back issues)      Starting Weight: 279 lbs  Weight loss total: 60 lbs  Today:220 lb  Weight loss since last visit: 44 lbs  GOAL WEIGHT: 210  PT is happy with loss; wants to continue with medication   SE: minimal; well tolerated   Current dose: 1mg Ozempic  Exercise?: N other than work ([employer]) Strength training?: N; advised to add to try and start building back muscle mass  High protein Diet: Y  Questions/concerns: none  He is near his goal weight we discussed decreasing to 0.5mg dose once gets to that goal; then will recheck in 3 months and then plan to increase time between doses starting with every other week  Of note pt has been off CPAP as no longer needs with 60 lb weight loss; he also is not needing daily PPI; feels great      Review of Systems   All other systems reviewed and are negative.     Objective:     Vitals:    06/22/23 1512   BP: 110/72   Pulse: 72   Temp: 98.2 °F (36.8 °C)      Physical Exam  Vitals reviewed.   Constitutional:       Appearance: Normal appearance. He is obese.   HENT:      Head: Normocephalic and atraumatic.   Eyes:      Conjunctiva/sclera: Conjunctivae normal.   Cardiovascular:      Rate and Rhythm: Normal rate and regular rhythm.      Heart sounds: Normal heart sounds.   Pulmonary:      Effort: Pulmonary effort is normal.      Breath sounds: Normal breath sounds.   Abdominal:      Palpations: Abdomen is soft.      Tenderness: There is no abdominal tenderness.   Musculoskeletal:         General: Normal range of motion.      Cervical back: Normal range of motion.      Right lower leg: No edema.      Left lower leg: No edema.   Neurological:      General: No focal deficit present.      Mental Status: He is alert and oriented to person, place, and time.   Psychiatric:         Mood and Affect: Mood normal.         Behavior: Behavior  normal.        Assessment:         1. Class 1 obesity due to excess calories without serious comorbidity with body mass index (BMI) of 30.0 to 30.9 in adult    2. Strain of left rhomboid muscle          Plan:   1. Class 1 obesity due to excess calories without serious comorbidity with body mass index (BMI) of 30.0 to 30.9 in adult    2. Strain of left rhomboid muscle  - tiZANidine (ZANAFLEX) 4 MG tablet; Take ½ to 1 tablet by mouth nightly as needed (muscle spasms).  Dispense: 60 tablet; Refill: 1       Continue current medication; well tolerated  May consider decreasing to 0.5mg weekly for maintenance then space to every other week   Continue diet and exercise  Goal to preserve muscle mass; more strength training; HIGH Protein (min 100g/day)  RTC in 3 months for weight check     Stretches provided for muscle strain; QHS muscle relaxer; Voltaren gel in daytime; heat before stretching; if not improving in 2 weeks let us know       Linn Carlossharry [facility] Medicine   6/22/23

## 2023-07-10 ENCOUNTER — PATIENT MESSAGE (OUTPATIENT)
Dept: FAMILY MEDICINE | Facility: CLINIC | Age: 33
End: 2023-07-10
Payer: COMMERCIAL

## 2023-07-10 DIAGNOSIS — M25.512 ACUTE PAIN OF LEFT SHOULDER: Primary | ICD-10-CM

## 2023-07-10 NOTE — TELEPHONE ENCOUNTER
Likely will be denied but go ahead with PA; for shoulder he should see ortho; does he want referral? I will also send to PCP Vinay URIBE; pt may need ref to ortho

## 2023-07-13 ENCOUNTER — TELEPHONE (OUTPATIENT)
Dept: PHARMACY | Facility: CLINIC | Age: 33
End: 2023-07-13
Payer: COMMERCIAL

## 2023-07-17 ENCOUNTER — HOSPITAL ENCOUNTER (OUTPATIENT)
Dept: RADIOLOGY | Facility: HOSPITAL | Age: 33
Discharge: HOME | End: 2023-07-17
Attending: PHYSICIAN ASSISTANT
Payer: COMMERCIAL

## 2023-07-17 ENCOUNTER — OFFICE VISIT (OUTPATIENT)
Dept: ORTHOPEDICS | Facility: CLINIC | Age: 33
End: 2023-07-17
Payer: COMMERCIAL

## 2023-07-17 VITALS — WEIGHT: 214.5 LBS | BODY MASS INDEX: 29.05 KG/M2 | HEIGHT: 72 IN

## 2023-07-17 DIAGNOSIS — M54.6 ACUTE LEFT-SIDED THORACIC BACK PAIN: Primary | ICD-10-CM

## 2023-07-17 DIAGNOSIS — M54.6 ACUTE LEFT-SIDED THORACIC BACK PAIN: ICD-10-CM

## 2023-07-17 DIAGNOSIS — M25.512 ACUTE PAIN OF LEFT SHOULDER: ICD-10-CM

## 2023-07-17 DIAGNOSIS — M89.8X1 PERISCAPULAR PAIN: ICD-10-CM

## 2023-07-17 PROCEDURE — 72070 X-RAY EXAM THORAC SPINE 2VWS: CPT | Mod: 26,,, | Performed by: RADIOLOGY

## 2023-07-17 PROCEDURE — 1160F PR REVIEW ALL MEDS BY PRESCRIBER/CLIN PHARMACIST DOCUMENTED: ICD-10-PCS | Mod: CPTII,S$GLB,, | Performed by: PHYSICIAN ASSISTANT

## 2023-07-17 PROCEDURE — 3044F HG A1C LEVEL LT 7.0%: CPT | Mod: CPTII,S$GLB,, | Performed by: PHYSICIAN ASSISTANT

## 2023-07-17 PROCEDURE — 72070 XR THORACIC SPINE AP LATERAL: ICD-10-PCS | Mod: 26,,, | Performed by: RADIOLOGY

## 2023-07-17 PROCEDURE — 99999 PR PBB SHADOW E&M-EST. PATIENT-LVL IV: CPT | Mod: PBBFAC,,, | Performed by: PHYSICIAN ASSISTANT

## 2023-07-17 PROCEDURE — 1160F RVW MEDS BY RX/DR IN RCRD: CPT | Mod: CPTII,S$GLB,, | Performed by: PHYSICIAN ASSISTANT

## 2023-07-17 PROCEDURE — 72070 X-RAY EXAM THORAC SPINE 2VWS: CPT | Mod: TC

## 2023-07-17 PROCEDURE — 1159F PR MEDICATION LIST DOCUMENTED IN MEDICAL RECORD: ICD-10-PCS | Mod: CPTII,S$GLB,, | Performed by: PHYSICIAN ASSISTANT

## 2023-07-17 PROCEDURE — 99203 PR OFFICE/OUTPT VISIT, NEW, LEVL III, 30-44 MIN: ICD-10-PCS | Mod: S$GLB,,, | Performed by: PHYSICIAN ASSISTANT

## 2023-07-17 PROCEDURE — 3008F BODY MASS INDEX DOCD: CPT | Mod: CPTII,S$GLB,, | Performed by: PHYSICIAN ASSISTANT

## 2023-07-17 PROCEDURE — 3044F PR MOST RECENT HEMOGLOBIN A1C LEVEL <7.0%: ICD-10-PCS | Mod: CPTII,S$GLB,, | Performed by: PHYSICIAN ASSISTANT

## 2023-07-17 PROCEDURE — 99999 PR PBB SHADOW E&M-EST. PATIENT-LVL IV: ICD-10-PCS | Mod: PBBFAC,,, | Performed by: PHYSICIAN ASSISTANT

## 2023-07-17 PROCEDURE — 3008F PR BODY MASS INDEX (BMI) DOCUMENTED: ICD-10-PCS | Mod: CPTII,S$GLB,, | Performed by: PHYSICIAN ASSISTANT

## 2023-07-17 PROCEDURE — 1159F MED LIST DOCD IN RCRD: CPT | Mod: CPTII,S$GLB,, | Performed by: PHYSICIAN ASSISTANT

## 2023-07-17 PROCEDURE — 99203 OFFICE O/P NEW LOW 30 MIN: CPT | Mod: S$GLB,,, | Performed by: PHYSICIAN ASSISTANT

## 2023-07-17 RX ORDER — METHYLPREDNISOLONE 4 MG/1
TABLET ORAL
Qty: 21 EACH | Refills: 0 | Status: SHIPPED | OUTPATIENT
Start: 2023-07-17 | End: 2023-08-07

## 2023-07-17 NOTE — PROGRESS NOTES
SUBJECTIVE:     Chief Complaint & History of Present Illness:  Marshal Baer is a 33 y.o. year old male who presents today with upper back/shoulder blade pain for one month.  He is Right hand dominant.  There is not a history of injury.  The pain is located at the distal border of the left scapula.  The pain is described as sharp.  He does not associate it with any particular activity and feels this more at rest. He has tried rest, stretching, muscle relaxants, tylenol and voltaren without improvement.  He avoids NSAIDS due to history of GI bleed.  There is not a history of previous injury or surgery to the shoulder.      Review of patient's allergies indicates:   Allergen Reactions    Tetracycline Other (See Comments)         Current Outpatient Medications   Medication Sig Dispense Refill    albuterol (ACCUNEB) 0.63 mg/3 mL Nebu Take 3 mLs (0.63 mg total) by nebulization every 6 (six) hours as needed (wheezing). Rescue 90 mL 0    clindamycin (CLEOCIN T) 1 % external solution Apply solution to back daily after showering 60 mL 5    FLUoxetine 40 MG capsule Take 1 capsule (40 mg total) by mouth once daily. 90 capsule 1    fluticasone propion-salmeterol 115-21 mcg/dose (ADVAIR HFA) 115-21 mcg/actuation HFAA inhaler Inhale 2 puffs into the lungs every 12 (twelve) hours. Controller 12 g 5    montelukast (SINGULAIR) 10 mg tablet Take 1 tablet (10 mg total) by mouth every evening. 30 tablet 11    pantoprazole (PROTONIX) 40 MG tablet Take 1 tablet (40 mg total) by mouth once daily. 30 tablet 11    semaglutide (OZEMPIC) 1 mg/dose (4 mg/3 mL) Inject 1 mg into the skin every 7 days. 9 mL 3    tiZANidine (ZANAFLEX) 4 MG tablet Take ½ to 1 tablet by mouth nightly as needed (muscle spasms). 60 tablet 1     No current facility-administered medications for this visit.       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    Benign prostatic hyperplasia with urinary frequency 12/10/2020    Class 2 obesity due to excess calories without serious comorbidity with body mass index (BMI) of 35.0 to 35.9 in adult 05/07/2020    - discussed recommendation for diet and cardiovascular exercise - counseling on lifestyle modifications for risk factor reduction    Deafness in right ear     since birth    Elevated LFTs 01/04/2021    Lab Results Component Value Date  ALT 44 04/08/2022  AST 34 04/08/2022  ALKPHOS 79 04/08/2022  BILITOT 0.4 04/08/2022  - 7/10/2018 hepatitis C negative - 3/23/22 CT abd/pelvis: fatty liver disease normal - likely related with diet and lifestyle - pt denies alcohol use - discussed recommendation for diet and cardiovascular exercise - counseling on lifestyle modifications for risk factor reduction -    Epididymal cyst 04/19/2021    Noted on u/s dated 8/17/2016.     Fatty liver 04/12/2022    - 03/23/2022 CT abdomen pelvis:  Diffuse hepatic steatosis similar to prior exams. - 7/10/2018 hepatitis C negative Lab Results Component Value Date  ALT 44 04/08/2022  AST 34 04/08/2022  ALKPHOS 79 04/08/2022  BILITOT 0.4 04/08/2022  - discussed recommendation for diet and cardiovascular exercise - counseling on lifestyle modifications for risk factor reduction    Gastroesophageal reflux disease without esophagitis 01/30/2020    - followed by gastroenterology - 08/07/2020 EGD:  Small hiatal hernia.  Negative H pylori.  No dysplasia.  No esophagitis    Hiatal hernia with gastroesophageal reflux disease and esophagitis     History of peptic ulcer 07/10/2020    Intermittent explosive disorder 01/31/2020    Varicocele 08/17/2016       Past Surgical History:   Procedure Laterality Date    COLONOSCOPY N/A 1/12/2021    Procedure: COLONOSCOPY;  Surgeon: Valentina Patton MD;  Location: [facility];  Service: Endoscopy;  Laterality: N/A;    ENDOSCOPY  11/2019    ESOPHAGOGASTRODUODENOSCOPY N/A 8/7/2020     Procedure: EGD (ESOPHAGOGASTRODUODENOSCOPY);  Surgeon: Valentina Patton MD;  Location: [facility];  Service: Endoscopy;  Laterality: N/A;    IMPLANTATION OF BONE ANCHORED HEARING AID (BAHA) Right 11/21/2022    Procedure: INSERTION, BAHA;  Surgeon: Edu Mistry MD;  Location: [facility] OR [address] [place] [address];  Service: ENT;  Laterality: Right;    VASECTOMY  12/27/2019       Review of Systems:  ROS:  Constitutional: no fever or chills  Eyes: no visual changes  ENT: no nasal congestion or sore throat  Respiratory: no cough or shortness of breath  Musculoskeletal: no arthralgias or myalgias      OBJECTIVE:     PHYSICAL EXAM:    General: Weight: 97.3 kg (214 lb 8.1 oz) Body mass index is 29.37 kg/m².  Patient is alert, awake and oriented to time, place and person. Mood and affect are appropriate.  Patient does not appear to be in any distress, denies any constitutional symptoms and appears stated age.   HEENT: Pupils are equal and round, sclera are not injected. External examination of ears and nose reveals no abnormalities. Cranial nerves II-X are grossly intact  Neck: examination demonstrates painless active range of motion. Spurling's sign is negative  Skin: no rashes, abrasions or open wounds on the affected extremity   Resp: No respiratory distress or audible wheezing   Psych:  normal mood and behavior  CV: 2+ pulses, all extremities warm and well perfused   Left Shoulder   Scapular winging negative  Scapular dyskinesia negative  Examination of the back shows no atrophy  Tenderness: distal medial border of scapula  Range of motion is not painful   ROM: forward flexion 180/180, extension 45/45, full abduction 180/180, abduction-glenohumeral 90/90, external rotation 50/50  Shoulder Strength: biceps 5/5, triceps 5/5, abduction 5/5, adduction 5/5  negative for impingement sign, sensory exam normal, and motor exam normal  Special Tests:    Crossbody test: negative    Neer's negative  Hawkin's negative    Cecy's negative  Drop arm  negative  IMAGING:  X-rays of the left thoracic spine personally reviewed by me, demonstrate no acute abnormality.  No fracture or dislocation.     ASSESSMENT     1. Acute left-sided thoracic back pain    2. Acute pain of left shoulder      PLAN:     Discussed with the patient at length all the different treatment options available for his left shoulder including anti-inflammatories, acetaminophen, rest, ice, Physical therapy, occasional cortisone injections for temporary relief, and shoulder arthroscopy    - Left periscapular pain- possibly from nature of work and overuse .  Continue symptomatic treatment, rest, activity modification  - Medrol dose pack  - PT referral  - Follow up if symptoms worsen or fail to improve

## 2023-07-19 PROBLEM — M54.6 PAIN IN THORACIC SPINE: Status: ACTIVE | Noted: 2023-07-19

## 2023-07-19 PROBLEM — S46.912A MUSCLE STRAIN OF LEFT SCAPULAR REGION: Status: ACTIVE | Noted: 2023-07-19

## 2023-08-20 ENCOUNTER — PATIENT MESSAGE (OUTPATIENT)
Dept: AUDIOLOGY | Facility: CLINIC | Age: 33
End: 2023-08-20
Payer: COMMERCIAL

## 2023-08-25 ENCOUNTER — CLINICAL SUPPORT (OUTPATIENT)
Dept: AUDIOLOGY | Facility: CLINIC | Age: 33
End: 2023-08-25
Payer: COMMERCIAL

## 2023-08-25 DIAGNOSIS — H90.41 SENSORINEURAL HEARING LOSS (SNHL) OF RIGHT EAR WITH UNRESTRICTED HEARING OF LEFT EAR: Primary | ICD-10-CM

## 2023-08-25 PROCEDURE — 92557 COMPREHENSIVE HEARING TEST: CPT | Mod: 52,S$GLB,, | Performed by: AUDIOLOGIST

## 2023-08-25 PROCEDURE — 92557 PR COMPREHENSIVE HEARING TEST: ICD-10-PCS | Mod: 52,S$GLB,, | Performed by: AUDIOLOGIST

## 2023-08-25 PROCEDURE — 99999 PR PBB SHADOW E&M-EST. PATIENT-LVL I: CPT | Mod: PBBFAC,,, | Performed by: AUDIOLOGIST

## 2023-08-25 PROCEDURE — 92567 PR TYMPA2METRY: ICD-10-PCS | Mod: S$GLB,,, | Performed by: AUDIOLOGIST

## 2023-08-25 PROCEDURE — 99999 PR PBB SHADOW E&M-EST. PATIENT-LVL I: ICD-10-PCS | Mod: PBBFAC,,, | Performed by: AUDIOLOGIST

## 2023-08-25 PROCEDURE — 92567 TYMPANOMETRY: CPT | Mod: S$GLB,,, | Performed by: AUDIOLOGIST

## 2023-08-25 PROCEDURE — 99499 UNLISTED E&M SERVICE: CPT | Mod: S$GLB,,, | Performed by: AUDIOLOGIST

## 2023-08-25 PROCEDURE — 99499 NO LOS: ICD-10-PCS | Mod: S$GLB,,, | Performed by: AUDIOLOGIST

## 2023-08-25 NOTE — PROGRESS NOTES
Marshal Baer, a 33 y.o. male, was seen today in the clinic for an audiologic evaluation.  Pt has a longstanding history of unilateral hearing loss with normal hearing in the left ear and profound sensorineural hearing loss (SNHL) in his right ear.  Pt wears a Cochlear Osia on the right side.  He reported that he is not hearing as well with his processor as he once did.        Tympanometry revealed Type A in the right ear and Type A in the left ear.  Audiogram results revealed normal hearing in the left ear.  Speech reception thresholds were noted at 5 dB in the left ear.  Speech discrimination scores were 100% in the left ear.      Recommendations:  Otologic evaluation  Annual audiogram  Hearing protection when in noise  Osia follow up

## 2023-08-28 ENCOUNTER — PATIENT MESSAGE (OUTPATIENT)
Dept: AUDIOLOGY | Facility: CLINIC | Age: 33
End: 2023-08-28
Payer: COMMERCIAL

## 2023-08-28 NOTE — PROGRESS NOTES
Marshal Baer, a 33 y.o. male, was seen today for a bone conduction hearing device check.  Pt currently wears a Cochlear Osia on his right side.  Pt complained that his processor did not sound as clear as is once did.  The processor was connected to programming software and a new feedback test and BC direct were completed.  Settings were updated.  Pt reported good subjective benefit and a noticeable change.  These steps were repeated for his backup processor as well.  We properly paired his processor to his phone for streaming and remote quin purposes.  Pt was happy with the function of the device at the conclusion of today's visit.      Bone Conduction Hearing Aid:  : Cochlear  Model: Osia   Side: Right  SN: [id number]    Backup SN: [id number]  Accessory: Mini Microphone        Recommendations:  Daily use of Osia  Follow up in one year  Contact office if programming issues arise  Contact Cochlear if device issues arise

## 2023-10-02 ENCOUNTER — PATIENT MESSAGE (OUTPATIENT)
Dept: FAMILY MEDICINE | Facility: CLINIC | Age: 33
End: 2023-10-02
Payer: COMMERCIAL

## 2023-10-05 ENCOUNTER — OFFICE VISIT (OUTPATIENT)
Dept: FAMILY MEDICINE | Facility: CLINIC | Age: 33
End: 2023-10-05
Payer: COMMERCIAL

## 2023-10-05 VITALS
BODY MASS INDEX: 27.74 KG/M2 | DIASTOLIC BLOOD PRESSURE: 70 MMHG | TEMPERATURE: 98 F | WEIGHT: 204.81 LBS | SYSTOLIC BLOOD PRESSURE: 110 MMHG | HEIGHT: 72 IN | HEART RATE: 60 BPM | OXYGEN SATURATION: 98 %

## 2023-10-05 DIAGNOSIS — R55 VASOVAGAL EPISODE: Primary | ICD-10-CM

## 2023-10-05 DIAGNOSIS — F41.9 ANXIETY: ICD-10-CM

## 2023-10-05 PROCEDURE — 3074F SYST BP LT 130 MM HG: CPT | Mod: CPTII,S$GLB,, | Performed by: NURSE PRACTITIONER

## 2023-10-05 PROCEDURE — 3044F HG A1C LEVEL LT 7.0%: CPT | Mod: CPTII,S$GLB,, | Performed by: NURSE PRACTITIONER

## 2023-10-05 PROCEDURE — 1159F PR MEDICATION LIST DOCUMENTED IN MEDICAL RECORD: ICD-10-PCS | Mod: CPTII,S$GLB,, | Performed by: NURSE PRACTITIONER

## 2023-10-05 PROCEDURE — 1160F RVW MEDS BY RX/DR IN RCRD: CPT | Mod: CPTII,S$GLB,, | Performed by: NURSE PRACTITIONER

## 2023-10-05 PROCEDURE — 3078F PR MOST RECENT DIASTOLIC BLOOD PRESSURE < 80 MM HG: ICD-10-PCS | Mod: CPTII,S$GLB,, | Performed by: NURSE PRACTITIONER

## 2023-10-05 PROCEDURE — 3008F PR BODY MASS INDEX (BMI) DOCUMENTED: ICD-10-PCS | Mod: CPTII,S$GLB,, | Performed by: NURSE PRACTITIONER

## 2023-10-05 PROCEDURE — 3074F PR MOST RECENT SYSTOLIC BLOOD PRESSURE < 130 MM HG: ICD-10-PCS | Mod: CPTII,S$GLB,, | Performed by: NURSE PRACTITIONER

## 2023-10-05 PROCEDURE — 99214 PR OFFICE/OUTPT VISIT, EST, LEVL IV, 30-39 MIN: ICD-10-PCS | Mod: S$GLB,,, | Performed by: NURSE PRACTITIONER

## 2023-10-05 PROCEDURE — 1159F MED LIST DOCD IN RCRD: CPT | Mod: CPTII,S$GLB,, | Performed by: NURSE PRACTITIONER

## 2023-10-05 PROCEDURE — 99999 PR PBB SHADOW E&M-EST. PATIENT-LVL IV: ICD-10-PCS | Mod: PBBFAC,,, | Performed by: NURSE PRACTITIONER

## 2023-10-05 PROCEDURE — 3008F BODY MASS INDEX DOCD: CPT | Mod: CPTII,S$GLB,, | Performed by: NURSE PRACTITIONER

## 2023-10-05 PROCEDURE — 99214 OFFICE O/P EST MOD 30 MIN: CPT | Mod: S$GLB,,, | Performed by: NURSE PRACTITIONER

## 2023-10-05 PROCEDURE — 3044F PR MOST RECENT HEMOGLOBIN A1C LEVEL <7.0%: ICD-10-PCS | Mod: CPTII,S$GLB,, | Performed by: NURSE PRACTITIONER

## 2023-10-05 PROCEDURE — 1160F PR REVIEW ALL MEDS BY PRESCRIBER/CLIN PHARMACIST DOCUMENTED: ICD-10-PCS | Mod: CPTII,S$GLB,, | Performed by: NURSE PRACTITIONER

## 2023-10-05 PROCEDURE — 3078F DIAST BP <80 MM HG: CPT | Mod: CPTII,S$GLB,, | Performed by: NURSE PRACTITIONER

## 2023-10-05 PROCEDURE — 99999 PR PBB SHADOW E&M-EST. PATIENT-LVL IV: CPT | Mod: PBBFAC,,, | Performed by: NURSE PRACTITIONER

## 2023-10-05 RX ORDER — FLUOXETINE HYDROCHLORIDE 20 MG/1
20 CAPSULE ORAL DAILY
Qty: 30 CAPSULE | Refills: 1 | Status: SHIPPED | OUTPATIENT
Start: 2023-10-05 | End: 2024-01-29 | Stop reason: HOSPADM

## 2023-10-05 NOTE — PROGRESS NOTES
Subjective:       Patient ID: Marshal Baer is a 33 y.o. male.    Chief Complaint: Loss of Consciousness    Loss of Consciousness  Pertinent negatives include no abdominal pain, chest pain or palpitations.       Patient is a 33 year old white male with Asthma, chronic allergies, deaf in right ear since birth with bone anchored hearing aide right side 11/2022, Fatty Liver with history of elevated LFTs, small hiatal hernia with chronic GERD, Obesity, Anxiety and intermittent explosive disorder, TED with CPAP ordered followed by [facility], and BPH with urinary frequency followed by [facility] that is here today for ER FOLLOW UP for VASOVAGAL EPISODE after sexual activity.    ER FOLLOW UP:  Vasovagal Episode  HPI:  33-year-old male presents after a syncopal episode last night which occurred a completion of intercourse.  He reports a similar episode approximately 2 weeks ago.  His wife encouraged masturbation this morning to see if this would precipitate symptoms.  He reports that he had dizziness and near syncope.  He has no known coronary artery disease.  There is a family history of same.  ER VISIT COURSE:  EKG Sinus Bradycardia 59, otherwise normal EKG  CBC normal  CMP normal other than ALT 45 - history of fatty liver with elevated LFTs  Troponin level was NORMAL  Diagnosis:  Vasovagal Episode  ER physician recommended referral to Cardiology for evaluation of syncopal episode  Patient had appointment with cardiologist Dr. Adhikari on 10/25/23  Patient has known BPH followed by Urology NP so has appointment on Monday 10/9/23 since ONLY OCCURS with sexual activity - no other physical activities.     Anxiety and Intermittent Explosive Disorder  WAS taking Fluoxetine 40 mg daily  STOPPED TAKING medication COLD TURKEY around 6 weeks ago  States symptoms of syncope started occurring around 3 to 4 weeks after stopping medication  He DID NOT wean off of medication  States he started back on  medication 2 days ago  Advised not to start back at so high a dose  Will start back on Fluoxetine 20 mg daily  Recheck in 5 to 6 weeks.    BPH  Followed by [facility] NP Madhav   Has appt next week    Obesity  Body mass index is 27.78 kg/m².  Was seeing Dr. Carrillo for weight loss - Started on Ozempic in March 2023  Lost 60 pounds in past 6 months  OFF Ozempic at end of June 2023 due to INSURANCE NO LONGER COVERING.      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  May consider checking PFTs after a couple months of being off controller.        Mixed Conductive and Sensorineural hearing loss/ Bone Anchored hearing aide right side.  Followed by ENT       Small hiatal hernia with chronic GERD  Followed by [facility]     Fatty Liver  03/23/2022 CT abdomen pelvis:  Diffuse hepatic steatosis similar to prior exams.  7/10/2018 hepatitis C negative     Obstructive Sleep Apnea  Reports sleep studies done and sent his results to [facility]  Supposed to be CPAP nightly but not using since weight loss.          Review of Systems   HENT:  Negative for congestion, postnasal drip, rhinorrhea and sinus pressure.    Respiratory:  Negative for chest tightness, shortness of breath and wheezing.    Cardiovascular:  Positive for syncope. Negative for chest pain and palpitations.   Gastrointestinal:  Negative for abdominal pain.   Neurological:  Positive for syncope.        Reports syncopal episodes with sexual activity ONLY; no other physical activity         Objective:     Vitals:    10/05/23 0701   BP: 110/70   BP Location: Left arm   Patient Position: Sitting   BP Method: Large (Manual)   Pulse: 60    Temp: 97.7 °F (36.5 °C)   TempSrc: Temporal   SpO2: 98%   Weight: 92.9 kg (204 lb 12.9 oz)   Height: 6' (1.829 m)          Physical Exam  Constitutional:       General: He is not in acute distress.     Appearance: Normal appearance. He is not toxic-appearing or diaphoretic.      Comments: Body mass index is 27.78 kg/m².  Lost 60 pounds in past 6 months.   Cardiovascular:      Rate and Rhythm: Normal rate and regular rhythm.      Heart sounds: No murmur heard.  Pulmonary:      Effort: Pulmonary effort is normal. No respiratory distress.   Abdominal:      General: There is no distension.   Skin:     General: Skin is warm and dry.   Neurological:      Mental Status: He is alert and oriented to person, place, and time.   Psychiatric:         Mood and Affect: Mood normal.         Behavior: Behavior normal.         Thought Content: Thought content normal.           Assessment:         ICD-10-CM ICD-9-CM   1. Vasovagal episode  R55 780.2   2. Anxiety  F41.9 300.00       Plan:       Vasovagal episode  Appt with cardiology 10.25.23    Anxiety  Start back on fluoxetine 20 mg daily  Recheck in 6 weeks.  -     FLUoxetine 20 MG capsule; Take 1 capsule (20 mg total) by mouth once daily.  Dispense: 30 capsule; Refill: 1      Follow up in about 6 weeks (around 11/16/2023) for follow up.     Patient's Medications   New Prescriptions    FLUOXETINE 20 MG CAPSULE    Take 1 capsule (20 mg total) by mouth once daily.   Previous Medications    No medications on file   Modified Medications    No medications on file   Discontinued Medications    ALBUTEROL (ACCUNEB) 0.63 MG/3 ML NEBU    Take 3 mLs (0.63 mg total) by nebulization every 6 (six) hours as needed (wheezing). Rescue    CLINDAMYCIN (CLEOCIN T) 1 % EXTERNAL SOLUTION    Apply solution to back daily after showering    FLUOXETINE 40 MG CAPSULE    Take 1 capsule (40 mg total) by mouth once daily.    FLUTICASONE PROPION-SALMETEROL 115-21 MCG/DOSE (ADVAIR HFA) 115-21 MCG/ACTUATION HFAA  INHALER    Inhale 2 puffs into the lungs every 12 (twelve) hours. Controller    HYDROXYZINE HCL (ATARAX) 25 MG TABLET    Take 1 tablet (25 mg total) by mouth every 6 (six) hours.    METHYLPREDNISOLONE (MEDROL DOSEPACK) 4 MG TABLET    Take as directed on package    MONTELUKAST (SINGULAIR) 10 MG TABLET    Take 1 tablet (10 mg total) by mouth every evening.    PANTOPRAZOLE (PROTONIX) 40 MG TABLET    Take 1 tablet (40 mg total) by mouth once daily.    SEMAGLUTIDE (OZEMPIC) 1 MG/DOSE (4 MG/3 ML)    Inject 1 mg into the skin every 7 days.    TIZANIDINE (ZANAFLEX) 4 MG TABLET    Take ½ to 1 tablet by mouth nightly as needed (muscle spasms).       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    Benign prostatic hyperplasia with urinary frequency 12/10/2020    Class 2 obesity due to excess calories without serious comorbidity with body mass index (BMI) of 35.0 to 35.9 in adult 05/07/2020    - discussed recommendation for diet and cardiovascular exercise - counseling on lifestyle modifications for risk factor reduction    Deafness in right ear     since birth    Elevated LFTs 01/04/2021    Lab Results Component Value Date  ALT 44 04/08/2022  AST 34 04/08/2022  ALKPHOS 79 04/08/2022  BILITOT 0.4 04/08/2022  - 7/10/2018 hepatitis C negative - 3/23/22 CT abd/pelvis: fatty liver disease normal - likely related with diet and lifestyle - pt denies alcohol use - discussed recommendation for diet and cardiovascular exercise - counseling on lifestyle modifications for risk factor reduction -    Epididymal cyst 04/19/2021    Noted on u/s dated 8/17/2016.     Fatty liver 04/12/2022    - 03/23/2022 CT abdomen pelvis:  Diffuse hepatic steatosis similar to prior exams. - 7/10/2018 hepatitis C negative Lab Results Component Value Date  ALT 44 04/08/2022  AST 34 04/08/2022  ALKPHOS 79 04/08/2022   BILITOT 0.4 04/08/2022  - discussed recommendation for diet and cardiovascular exercise - counseling on lifestyle modifications for risk factor reduction    Gastroesophageal reflux disease without esophagitis 01/30/2020    - followed by gastroenterology - 08/07/2020 EGD:  Small hiatal hernia.  Negative H pylori.  No dysplasia.  No esophagitis    Hiatal hernia with gastroesophageal reflux disease and esophagitis     History of peptic ulcer 07/10/2020    Intermittent explosive disorder 01/31/2020    Varicocele 08/17/2016       Past Surgical History:   Procedure Laterality Date    COLONOSCOPY N/A 1/12/2021    Procedure: COLONOSCOPY;  Surgeon: Valentina Patton MD;  Location: [facility];  Service: Endoscopy;  Laterality: N/A;    ENDOSCOPY  11/2019    ESOPHAGOGASTRODUODENOSCOPY N/A 8/7/2020    Procedure: EGD (ESOPHAGOGASTRODUODENOSCOPY);  Surgeon: Valentina Patton MD;  Location: [facility];  Service: Endoscopy;  Laterality: N/A;    IMPLANTATION OF BONE ANCHORED HEARING AID (BAHA) Right 11/21/2022    Procedure: INSERTION, BAHA;  Surgeon: Edu Mistry MD;  Location: [facility] OR [address] [place] [address];  Service: ENT;  Laterality: Right;    VASECTOMY  12/27/2019       Family History   Problem Relation Age of Onset    Heart disease Mother         aortic calcifications    Hyperlipidemia Mother     Diabetes Mother     Cancer Mother         lung cancer 55; thyroid cancer 55, colon cancer 55, lesion on the liver    Hypertension Mother     Lung cancer Mother     Hyperlipidemia Father     Heart disease Father         MI - age 30 - stents placed; CHF    Hypertension Father     Diabetes Father     Alcohol abuse Father     Heart disease Paternal Uncle 42        Heart Attack in 1999    Cancer Maternal Grandmother         Breast  cancer 2006    No Known Problems Maternal Grandfather     Prostate cancer Neg Hx     Kidney disease Neg Hx        Social History     Socioeconomic History    Marital status:     Number of children: 2   Occupational  History     Employer: [employer]    Occupation: chemical    Tobacco Use    Smoking status: Never     Passive exposure: Past    Smokeless tobacco: Never   Substance and Sexual Activity    Alcohol use: Yes     Comment: rarely    Drug use: No    Sexual activity: Yes     Partners: Female     Social Determinants of Health     Financial Resource Strain: Low Risk  (10/2/2023)    Overall Financial Resource Strain (CARDIA)     Difficulty of Paying Living Expenses: Not hard at all   Food Insecurity: No Food Insecurity (10/2/2023)    Hunger Vital Sign     Worried About Running Out of Food in the Last Year: Never true     Ran Out of Food in the Last Year: Never true   Transportation Needs: No Transportation Needs (10/2/2023)    PRAPARE - Transportation     Lack of Transportation (Medical): No     Lack of Transportation (Non-Medical): No   Physical Activity: Insufficiently Active (10/2/2023)    Exercise Vital Sign     Days of Exercise per Week: 3 days     Minutes of Exercise per Session: 10 min   Stress: No Stress Concern Present (10/2/2023)    [place] [employer] - Occupational Stress Questionnaire     Feeling of Stress : Not at all   Social Connections: Unknown (10/2/2023)    Social Connection and Isolation Panel [NHANES]     Frequency of Communication with Friends and Family: Never     Frequency of Social Gatherings with Friends and Family: Once a week     Active Member of Clubs or Organizations: No     Attends Club or Organization Meetings: Never     Marital Status:    Housing Stability: Low Risk  (10/2/2023)    Housing Stability Vital Sign     Unable to Pay for Housing in the Last Year: No     Number of Places Lived in the Last Year: 1     Unstable Housing in the Last Year: No

## 2023-10-17 ENCOUNTER — TELEPHONE (OUTPATIENT)
Dept: OPHTHALMOLOGY | Facility: CLINIC | Age: 33
End: 2023-10-17
Payer: COMMERCIAL

## 2023-10-17 NOTE — TELEPHONE ENCOUNTER
----- Message from Jennie Gordillo sent at 10/17/2023  3:45 PM CDT -----  Regarding: Urgent Appt  Contact: Pt  Pt is requesting a  callback  regarding a same day appt. Pt stated he was seen in the ED for his L eye. It feels like something is stuck in it and was recommended  to follow up with Ophthalmology. Please adv pt      Confirmed contact below:   Contact Name:Marshal Baer  Phone Number: [phone]

## 2024-01-03 ENCOUNTER — PATIENT MESSAGE (OUTPATIENT)
Dept: AUDIOLOGY | Facility: CLINIC | Age: 34
End: 2024-01-03
Payer: COMMERCIAL

## 2024-01-08 ENCOUNTER — PATIENT MESSAGE (OUTPATIENT)
Dept: FAMILY MEDICINE | Facility: CLINIC | Age: 34
End: 2024-01-08
Payer: COMMERCIAL

## 2024-01-09 RX ORDER — INDOMETHACIN 50 MG/1
50 CAPSULE ORAL 3 TIMES DAILY PRN
Qty: 30 CAPSULE | Refills: 0 | Status: SHIPPED | OUTPATIENT
Start: 2024-01-09

## 2024-01-29 ENCOUNTER — OFFICE VISIT (OUTPATIENT)
Dept: FAMILY MEDICINE | Facility: CLINIC | Age: 34
End: 2024-01-29
Payer: COMMERCIAL

## 2024-01-29 VITALS
BODY MASS INDEX: 28.65 KG/M2 | SYSTOLIC BLOOD PRESSURE: 102 MMHG | TEMPERATURE: 98 F | DIASTOLIC BLOOD PRESSURE: 70 MMHG | WEIGHT: 211.56 LBS | HEART RATE: 63 BPM | OXYGEN SATURATION: 98 % | HEIGHT: 72 IN

## 2024-01-29 DIAGNOSIS — H90.6 MIXED CONDUCTIVE AND SENSORINEURAL HEARING LOSS OF BOTH EARS: ICD-10-CM

## 2024-01-29 DIAGNOSIS — F63.81 INTERMITTENT EXPLOSIVE DISORDER: ICD-10-CM

## 2024-01-29 DIAGNOSIS — K76.0 FATTY LIVER: ICD-10-CM

## 2024-01-29 DIAGNOSIS — G47.33 OSA ON CPAP: ICD-10-CM

## 2024-01-29 DIAGNOSIS — Z13.29 THYROID DISORDER SCREEN: ICD-10-CM

## 2024-01-29 DIAGNOSIS — E66.3 OVERWEIGHT WITH BODY MASS INDEX (BMI) OF 28 TO 28.9 IN ADULT: ICD-10-CM

## 2024-01-29 DIAGNOSIS — Z00.00 ENCOUNTER FOR BLOOD TEST FOR ROUTINE GENERAL PHYSICAL EXAMINATION: ICD-10-CM

## 2024-01-29 DIAGNOSIS — Z13.0 SCREENING FOR DEFICIENCY ANEMIA: ICD-10-CM

## 2024-01-29 DIAGNOSIS — J45.20 MILD INTERMITTENT ASTHMA WITHOUT COMPLICATION: ICD-10-CM

## 2024-01-29 DIAGNOSIS — N40.0 BENIGN PROSTATIC HYPERPLASIA WITHOUT LOWER URINARY TRACT SYMPTOMS: ICD-10-CM

## 2024-01-29 DIAGNOSIS — Z13.220 SCREENING CHOLESTEROL LEVEL: ICD-10-CM

## 2024-01-29 DIAGNOSIS — K44.9 HIATAL HERNIA: ICD-10-CM

## 2024-01-29 DIAGNOSIS — Z13.1 DIABETES MELLITUS SCREENING: ICD-10-CM

## 2024-01-29 DIAGNOSIS — F41.9 ANXIETY: Primary | ICD-10-CM

## 2024-01-29 PROBLEM — M54.6 PAIN IN THORACIC SPINE: Status: RESOLVED | Noted: 2023-07-19 | Resolved: 2024-01-29

## 2024-01-29 PROBLEM — K21.9 GASTROESOPHAGEAL REFLUX DISEASE WITHOUT ESOPHAGITIS: Status: RESOLVED | Noted: 2020-01-30 | Resolved: 2024-01-29

## 2024-01-29 PROBLEM — L30.9 DERMATITIS: Status: RESOLVED | Noted: 2022-06-22 | Resolved: 2024-01-29

## 2024-01-29 PROBLEM — M79.672 LEFT FOOT PAIN: Status: RESOLVED | Noted: 2022-06-22 | Resolved: 2024-01-29

## 2024-01-29 PROBLEM — K30 FUNCTIONAL DYSPEPSIA: Status: RESOLVED | Noted: 2022-04-13 | Resolved: 2024-01-29

## 2024-01-29 PROBLEM — E66.09 CLASS 1 OBESITY DUE TO EXCESS CALORIES WITHOUT SERIOUS COMORBIDITY WITH BODY MASS INDEX (BMI) OF 30.0 TO 30.9 IN ADULT: Status: RESOLVED | Noted: 2020-05-07 | Resolved: 2024-01-29

## 2024-01-29 PROBLEM — R35.0 URINARY FREQUENCY: Status: RESOLVED | Noted: 2020-12-10 | Resolved: 2024-01-29

## 2024-01-29 PROBLEM — E66.811 CLASS 1 OBESITY DUE TO EXCESS CALORIES WITHOUT SERIOUS COMORBIDITY WITH BODY MASS INDEX (BMI) OF 30.0 TO 30.9 IN ADULT: Status: RESOLVED | Noted: 2020-05-07 | Resolved: 2024-01-29

## 2024-01-29 PROBLEM — R79.89 ELEVATED LFTS: Status: RESOLVED | Noted: 2021-01-04 | Resolved: 2024-01-29

## 2024-01-29 PROBLEM — N50.3 EPIDIDYMAL CYST: Status: RESOLVED | Noted: 2021-04-19 | Resolved: 2024-01-29

## 2024-01-29 PROBLEM — S46.912A MUSCLE STRAIN OF LEFT SCAPULAR REGION: Status: RESOLVED | Noted: 2023-07-19 | Resolved: 2024-01-29

## 2024-01-29 PROCEDURE — 1159F MED LIST DOCD IN RCRD: CPT | Mod: CPTII,S$GLB,, | Performed by: NURSE PRACTITIONER

## 2024-01-29 PROCEDURE — 99214 OFFICE O/P EST MOD 30 MIN: CPT | Mod: S$GLB,,, | Performed by: NURSE PRACTITIONER

## 2024-01-29 PROCEDURE — 3008F BODY MASS INDEX DOCD: CPT | Mod: CPTII,S$GLB,, | Performed by: NURSE PRACTITIONER

## 2024-01-29 PROCEDURE — 3074F SYST BP LT 130 MM HG: CPT | Mod: CPTII,S$GLB,, | Performed by: NURSE PRACTITIONER

## 2024-01-29 PROCEDURE — 3078F DIAST BP <80 MM HG: CPT | Mod: CPTII,S$GLB,, | Performed by: NURSE PRACTITIONER

## 2024-01-29 PROCEDURE — 1160F RVW MEDS BY RX/DR IN RCRD: CPT | Mod: CPTII,S$GLB,, | Performed by: NURSE PRACTITIONER

## 2024-01-29 PROCEDURE — 99999 PR PBB SHADOW E&M-EST. PATIENT-LVL III: CPT | Mod: PBBFAC,,, | Performed by: NURSE PRACTITIONER

## 2024-01-29 RX ORDER — ALBUTEROL SULFATE 90 UG/1
2 AEROSOL, METERED RESPIRATORY (INHALATION) EVERY 6 HOURS PRN
Qty: 8.5 G | Refills: 3 | Status: SHIPPED | OUTPATIENT
Start: 2024-01-29 | End: 2025-01-28

## 2024-01-29 NOTE — PROGRESS NOTES
Subjective:       Patient ID: Marshal Baer is a 33 y.o. male.    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     Anxiety and Intermittent Explosive Disorder  WAS taking Fluoxetine 40 mg daily  STOPPED TAKING medication COLD TURKEY around September 2023  States symptoms of syncope started occurring around 3 to 4 weeks after stopping medication  He DID NOT wean off of medication  States back on Fluoxetine 20 mg daily in October 2023  States he has been only taking two to three days per week for past couple months and doing well  Can STOP medication today and return if symptoms recur.       BPH  Followed by [facility] NP Madhav in past - last visit 2/21/2022  Was on Tamsulosin in past but no longer on medication and not having any urinary symptoms.  Stable.     History of Obesity  Body mass index is 28.69 kg/m².  Was seeing Dr. Carrillo for weight loss - Started on Ozempic in March 2023  Lost 53 pounds since March 2023.  OFF Ozempic at end of June 2023 due to INSURANCE NO LONGER COVERING.  Stable.        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  Reports still doing okay - had mild asthma flare with recent weather change.  Will refill  rescue inhaler.        Mixed Conductive and Sensorineural hearing loss/ Bone Anchored hearing aide right side.  right ear since birth with hearing loss left side  - 4/1/22 evaluated by ENT  Bone Anchored hearing aide right side 11/2022  Stable.          Small hiatal hernia with history of chronic GERD  Hiatal hernia noted on EGD 8/7/2020  [facility]  GERD resolved with weight loss.     Fatty Liver  03/23/2022 CT abdomen pelvis:  Diffuse hepatic steatosis similar to prior exams.  7/10/2018 hepatitis C negative  Patient has lost 53 pounds in past year  Stable.     Obstructive Sleep Apnea  Reports sleep studies done and sent his results to [facility]  Supposed to be CPAP nightly but not using since weight loss.  Patient reports resolved.      Review of Systems   HENT: Negative.     Respiratory: Negative.     Cardiovascular: Negative.    Gastrointestinal: Negative.          Objective:     Vitals:    01/29/24 1558   BP: 102/70   BP Location: Right arm   Patient Position: Sitting   BP Method: Large (Manual)   Pulse: 63   Temp: 98 °F (36.7 °C)   TempSrc: Temporal   SpO2: 98%   Weight: 95.9 kg (211 lb 8.5 oz)   Height: 6' (1.829 m)          Physical Exam  Constitutional:       General: He is not in acute distress.     Appearance: Normal appearance. He is not toxic-appearing or diaphoretic.      Comments: Body mass index is 28.69 kg/m².     Cardiovascular:      Rate and Rhythm: Normal rate and regular rhythm.      Heart sounds: Normal heart sounds. No murmur heard.  Pulmonary:      Effort: Pulmonary effort is normal. No respiratory distress.      Breath sounds: Normal breath sounds.   Abdominal:      General: There is no distension.   Neurological:      Mental Status: He is alert and oriented to person, place, and time.   Psychiatric:         Mood and Affect: Mood normal.         Behavior: Behavior normal.           Assessment:         ICD-10-CM ICD-9-CM   1. Anxiety  F41.9 300.00   2. Intermittent  explosive disorder  F63.81 312.34   3. Benign prostatic hyperplasia without lower urinary tract symptoms  N40.0 600.00   4. Overweight with body mass index (BMI) of 28 to 28.9 in adult  E66.3 278.02    Z68.28 V85.24   5. Mild intermittent asthma without complication  J45.20 493.90   6. Mixed conductive and sensorineural hearing loss of both ears  H90.6 389.22   7. Hiatal hernia  K44.9 553.3   8. Fatty liver  K76.0 571.8   9. TED on CPAP  G47.33 327.23   10. Encounter for blood test for routine general physical examination  Z00.00 V72.62   11. Screening for deficiency anemia  Z13.0 V78.1   12. Thyroid disorder screen  Z13.29 V77.0   13. Screening cholesterol level  Z13.220 V77.91   14. Diabetes mellitus screening  Z13.1 V77.1       Plan:       1. Anxiety  Overview:  WAS taking Fluoxetine 40 mg daily  STOPPED TAKING medication COLD TURKEY around September 2023  States symptoms of syncope started occurring around 3 to 4 weeks after stopping medication  He DID NOT wean off of medication  States back on Fluoxetine 20 mg daily in October 2023  States he has been only taking two to three days per week for past couple months and doing well  Can STOP medication today and return if symptoms recur.        2. Intermittent explosive disorder  Overview:  WAS taking Fluoxetine 40 mg daily  STOPPED TAKING medication COLD TURKEY around September 2023  States symptoms of syncope started occurring around 3 to 4 weeks after stopping medication  He DID NOT wean off of medication  States back on Fluoxetine 20 mg daily in October 2023  States he has been only taking two to three days per week for past couple months and doing well  Can STOP medication today and return if symptoms recur.        3. Benign prostatic hyperplasia without lower urinary tract symptoms  Overview:  Followed by [facility] NP Madhav in past - last visit 2/21/2022  Was on Tamsulosin in past but no longer on medication and not having any urinary  symptoms.  Stable.      4. Overweight with body mass index (BMI) of 28 to 28.9 in adult  Overview:  History of Obesity  Body mass index is 28.69 kg/m².  Was seeing Dr. Carrillo for weight loss - Started on Ozempic in March 2023  Lost 53 pounds since March 2023.  OFF Ozempic at end of June 2023 due to INSURANCE NO LONGER COVERING.  Stable.      5. 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  Reports still doing okay - had mild asthma flare with recent weather change.  Will refill rescue inhaler.    Orders:  -     albuterol (PROAIR HFA) 90 mcg/actuation inhaler; Inhale 2 puffs into the lungs every 6 (six) hours as needed for Wheezing. Rescue  Dispense: 18 g; Refill: 3    6. Mixed conductive and sensorineural hearing loss of both ears  Overview:  - right ear since birth with hearing loss left side  - 4/1/22 evaluated by ENT  Bone Anchored hearing aide right side.  Stable.      7. Hiatal hernia  Overview:  Hiatal hernia noted on EGD 8/7/2020  Followed by [facility]  GERD resolved with weight loss.      8. Fatty liver  Overview:  03/23/2022 CT abdomen pelvis:  Diffuse hepatic steatosis similar to prior exams.  7/10/2018 hepatitis C negative  Patient has lost 53 pounds in past year  Stable.      9. TED on CPAP  Overview:  Reports sleep studies done and sent his results to [facility]  Supposed to be CPAP nightly but not using since weight loss.  Patient reports resolved.      10. Encounter for blood test for routine general physical examination  -     CBC Auto Differential; Future; Expected date: 01/29/2024  -     Comprehensive Metabolic Panel; Future;  Expected date: 01/29/2024  -     Hemoglobin A1C; Future; Expected date: 01/29/2024  -     Lipid Panel; Future; Expected date: 01/29/2024  -     TSH; Future; Expected date: 01/29/2024    11. Screening for deficiency anemia  -     CBC Auto Differential; Future; Expected date: 01/29/2024    12. Thyroid disorder screen  -     TSH; Future; Expected date: 01/29/2024    13. Screening cholesterol level  -     Lipid Panel; Future; Expected date: 01/29/2024    14. Diabetes mellitus screening  -     Comprehensive Metabolic Panel; Future; Expected date: 01/29/2024  -     Hemoglobin A1C; Future; Expected date: 01/29/2024       Follow up in about 6 months (around 7/29/2024) for fasting labs and WELLNESS EXAM.     Patient's Medications   New Prescriptions    ALBUTEROL (PROAIR HFA) 90 MCG/ACTUATION INHALER    Inhale 2 puffs into the lungs every 6 (six) hours as needed for Wheezing. Rescue   Previous Medications    INDOMETHACIN (INDOCIN) 50 MG CAPSULE    Take 1 capsule (50 mg total) by mouth 3 (three) times daily as needed (back pain).   Modified Medications    No medications on file   Discontinued Medications    ALBUTEROL (VENTOLIN HFA) 90 MCG/ACTUATION INHALER    Inhale 2 puffs into the lungs every 6 (six) hours as needed for Wheezing or Shortness of Breath. Rescue    ERYTHROMYCIN (ROMYCIN) OPHTHALMIC OINTMENT    Place a 1/2 inch ribbon of ointment into the left lower eyelid three times a day for 7 days    FLUOXETINE 20 MG CAPSULE    Take 1 capsule (20 mg total) by mouth once daily.       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    Benign prostatic hyperplasia with urinary frequency 12/10/2020    Class 2 obesity due to excess calories without serious comorbidity with body mass index (BMI) of 35.0 to 35.9 in adult 05/07/2020    - discussed recommendation for diet and cardiovascular  exercise - counseling on lifestyle modifications for risk factor reduction    Deafness in right ear     since birth    Elevated LFTs 01/04/2021    Lab Results Component Value Date  ALT 44 04/08/2022  AST 34 04/08/2022  ALKPHOS 79 04/08/2022  BILITOT 0.4 04/08/2022  - 7/10/2018 hepatitis C negative - 3/23/22 CT abd/pelvis: fatty liver disease normal - likely related with diet and lifestyle - pt denies alcohol use - discussed recommendation for diet and cardiovascular exercise - counseling on lifestyle modifications for risk factor reduction -    Epididymal cyst 04/19/2021    Noted on u/s dated 8/17/2016.     Fatty liver 04/12/2022    - 03/23/2022 CT abdomen pelvis:  Diffuse hepatic steatosis similar to prior exams. - 7/10/2018 hepatitis C negative Lab Results Component Value Date  ALT 44 04/08/2022  AST 34 04/08/2022  ALKPHOS 79 04/08/2022  BILITOT 0.4 04/08/2022  - discussed recommendation for diet and cardiovascular exercise - counseling on lifestyle modifications for risk factor reduction    Gastroesophageal reflux disease without esophagitis 01/30/2020    - followed by gastroenterology - 08/07/2020 EGD:  Small hiatal hernia.  Negative H pylori.  No dysplasia.  No esophagitis    Hiatal hernia with gastroesophageal reflux disease and esophagitis     History of peptic ulcer 07/10/2020    Intermittent explosive disorder 01/31/2020    Varicocele 08/17/2016       Past Surgical History:   Procedure Laterality Date    COLONOSCOPY N/A 1/12/2021    Procedure: COLONOSCOPY;  Surgeon: Valentina Patton MD;  Location: [facility];  Service: Endoscopy;  Laterality: N/A;    ENDOSCOPY  11/2019    ESOPHAGOGASTRODUODENOSCOPY N/A 8/7/2020    Procedure: EGD (ESOPHAGOGASTRODUODENOSCOPY);  Surgeon: Valentina Patton MD;  Location: [facility];  Service: Endoscopy;  Laterality: N/A;    IMPLANTATION OF BONE ANCHORED HEARING AID (BAHA) Right 11/21/2022    Procedure: INSERTION, BAHA;  Surgeon: Edu Mistry MD;  Location: [facility] OR [address] [place] [address];   Service: ENT;  Laterality: Right;    VASECTOMY  12/27/2019       Family History   Problem Relation Age of Onset    Heart disease Mother         aortic calcifications    Hyperlipidemia Mother     Diabetes Mother     Cancer Mother         lung cancer 55; thyroid cancer 55, colon cancer 55, lesion on the liver    Hypertension Mother     Lung cancer Mother     Hyperlipidemia Father     Heart disease Father         MI - age 30 - stents placed; CHF    Hypertension Father     Diabetes Father     Alcohol abuse Father     Heart disease Paternal Uncle 42        Heart Attack in 1999    Cancer Maternal Grandmother         Breast  cancer 2006    No Known Problems Maternal Grandfather     Prostate cancer Neg Hx     Kidney disease Neg Hx        Social History     Socioeconomic History    Marital status:     Number of children: 2   Occupational History     Employer: [employer]    Occupation: chemical    Tobacco Use    Smoking status: Never     Passive exposure: Past    Smokeless tobacco: Never   Substance and Sexual Activity    Alcohol use: Yes     Comment: rarely    Drug use: No    Sexual activity: Yes     Partners: Female     Social Determinants of Health     Financial Resource Strain: Low Risk  (1/29/2024)    Overall Financial Resource Strain (CARDIA)     Difficulty of Paying Living Expenses: Not hard at all   Food Insecurity: No Food Insecurity (1/29/2024)    Hunger Vital Sign     Worried About Running Out of Food in the Last Year: Never true     Ran Out of Food in the Last Year: Never true   Transportation Needs: No Transportation Needs (1/29/2024)    PRAPARE - Transportation     Lack of Transportation (Medical): No     Lack of Transportation (Non-Medical): No   Physical Activity: Sufficiently Active (1/29/2024)    Exercise Vital Sign     Days of Exercise per Week: 3 days     Minutes of Exercise per Session: 50 min   Stress: No Stress Concern Present (1/29/2024)    [facility]  Occupational Health - Occupational Stress Questionnaire     Feeling of Stress : Not at all   Social Connections: Unknown (1/29/2024)    Social Connection and Isolation Panel [NHANES]     Frequency of Communication with Friends and Family: Never     Frequency of Social Gatherings with Friends and Family: Once a week     Active Member of Clubs or Organizations: No     Attends Club or Organization Meetings: Never     Marital Status:    Housing Stability: Low Risk  (1/29/2024)    Housing Stability Vital Sign     Unable to Pay for Housing in the Last Year: No     Number of Places Lived in the Last Year: 1     Unstable Housing in the Last Year: No

## 2024-02-05 NOTE — PROGRESS NOTES
Postop osia 1 wk    Doing well, no issues.      Exam:    Incision c/d/I no signs of infection, minimal swelling    A/P    Plan for activation in 3 wks   Wound check at same time    
0

## 2024-02-22 ENCOUNTER — PATIENT MESSAGE (OUTPATIENT)
Dept: FAMILY MEDICINE | Facility: CLINIC | Age: 34
End: 2024-02-22
Payer: COMMERCIAL

## 2024-03-11 ENCOUNTER — PATIENT MESSAGE (OUTPATIENT)
Dept: FAMILY MEDICINE | Facility: CLINIC | Age: 34
End: 2024-03-11
Payer: COMMERCIAL

## 2024-03-11 DIAGNOSIS — J31.0 CHRONIC RHINITIS: ICD-10-CM

## 2024-03-11 RX ORDER — MONTELUKAST SODIUM 10 MG/1
10 TABLET ORAL NIGHTLY
Qty: 30 TABLET | Refills: 11 | Status: SHIPPED | OUTPATIENT
Start: 2024-03-11

## 2024-04-01 ENCOUNTER — PATIENT MESSAGE (OUTPATIENT)
Dept: FAMILY MEDICINE | Facility: CLINIC | Age: 34
End: 2024-04-01
Payer: COMMERCIAL

## 2024-04-01 DIAGNOSIS — J45.40 MODERATE PERSISTENT ASTHMA WITHOUT COMPLICATION: ICD-10-CM

## 2024-04-01 RX ORDER — FLUTICASONE PROPIONATE AND SALMETEROL XINAFOATE 115; 21 UG/1; UG/1
2 AEROSOL, METERED RESPIRATORY (INHALATION) EVERY 12 HOURS
Qty: 12 G | Refills: 5 | Status: SHIPPED | OUTPATIENT
Start: 2024-04-01 | End: 2025-04-01

## 2024-05-17 ENCOUNTER — HOSPITAL ENCOUNTER (OUTPATIENT)
Dept: RADIOLOGY | Facility: HOSPITAL | Age: 34
Discharge: HOME | End: 2024-05-17
Attending: FAMILY MEDICINE
Payer: COMMERCIAL

## 2024-05-17 DIAGNOSIS — Z02.1 PRE-EMPLOYMENT EXAMINATION: ICD-10-CM

## 2024-05-17 PROCEDURE — 71046 X-RAY EXAM CHEST 2 VIEWS: CPT | Mod: 26,,, | Performed by: RADIOLOGY

## 2024-05-17 PROCEDURE — 72100 X-RAY EXAM L-S SPINE 2/3 VWS: CPT | Mod: TC,FY,PN

## 2024-05-17 PROCEDURE — 72100 X-RAY EXAM L-S SPINE 2/3 VWS: CPT | Mod: 26,,, | Performed by: STUDENT IN AN ORGANIZED HEALTH CARE EDUCATION/TRAINING PROGRAM

## 2024-05-17 PROCEDURE — 71046 X-RAY EXAM CHEST 2 VIEWS: CPT | Mod: TC,FY,PN

## 2024-05-23 ENCOUNTER — CLINICAL SUPPORT (OUTPATIENT)
Dept: AUDIOLOGY | Facility: CLINIC | Age: 34
End: 2024-05-23
Payer: COMMERCIAL

## 2024-05-23 ENCOUNTER — OFFICE VISIT (OUTPATIENT)
Dept: OTOLARYNGOLOGY | Facility: CLINIC | Age: 34
End: 2024-05-23
Payer: COMMERCIAL

## 2024-05-23 DIAGNOSIS — H90.41 SENSORINEURAL HEARING LOSS (SNHL) OF RIGHT EAR WITH UNRESTRICTED HEARING OF LEFT EAR: Primary | ICD-10-CM

## 2024-05-23 DIAGNOSIS — H90.A31 MIXED CONDUCTIVE AND SENSORINEURAL HEARING LOSS OF RIGHT EAR WITH RESTRICTED HEARING OF LEFT EAR: Primary | ICD-10-CM

## 2024-05-23 DIAGNOSIS — H91.92 DEAFNESS IN LEFT EAR: ICD-10-CM

## 2024-05-23 PROCEDURE — 1159F MED LIST DOCD IN RCRD: CPT | Mod: CPTII,S$GLB,, | Performed by: OTOLARYNGOLOGY

## 2024-05-23 PROCEDURE — 99213 OFFICE O/P EST LOW 20 MIN: CPT | Mod: S$GLB,,, | Performed by: OTOLARYNGOLOGY

## 2024-05-23 PROCEDURE — 92567 TYMPANOMETRY: CPT | Mod: S$GLB,,,

## 2024-05-23 PROCEDURE — 92557 COMPREHENSIVE HEARING TEST: CPT | Mod: 52,S$GLB,,

## 2024-05-24 NOTE — PROGRESS NOTES
Please click on link to view Audiogram:  Document on 5/23/2024 1:38 PM by Caitlin Garcia AU.D: Audiogram    Mr. Marshal Baer, a 34 y.o. male, was seen in the clinic today for an audiological evaluation. Patient has long-standing history of profound hearing loss in his right ear. Patient [facility] utilizes [facility].    Otoscopy clear bilaterally. Tympanometry testing revealed a Type A tympanogram for the right ear and a Type A tympanogram for the left ear.     Audiological testing revealed normal hearing sensitivity for the left ear. A speech reception threshold was obtained at 10 dBHL for the left ear. Speech discrimination was 100% for the left ear.    Recommendations:  1. Otologic evaluation  2. Annual audiological evaluation or sooner if hearing changes  3. Continue daily use of amplification  4. Hearing protection in noise

## 2024-05-26 ENCOUNTER — PATIENT MESSAGE (OUTPATIENT)
Dept: AUDIOLOGY | Facility: CLINIC | Age: 34
End: 2024-05-26
Payer: COMMERCIAL

## 2024-05-28 ENCOUNTER — CLINICAL SUPPORT (OUTPATIENT)
Dept: AUDIOLOGY | Facility: CLINIC | Age: 34
End: 2024-05-28
Payer: COMMERCIAL

## 2024-05-28 DIAGNOSIS — H90.41 SENSORINEURAL HEARING LOSS (SNHL) OF RIGHT EAR WITH UNRESTRICTED HEARING OF LEFT EAR: Primary | ICD-10-CM

## 2024-05-28 PROCEDURE — 92622 DX ALY AUD OI SND PRCSR 1ST: CPT | Mod: S$GLB,,, | Performed by: AUDIOLOGIST

## 2024-05-28 NOTE — PROGRESS NOTES
Marshal Baer, a 34 y.o. male, was seen today for an Osia check.  Pt currently wears an Osia(2) on his right side.  Pt complained that the magnets on his processors were too tight and causing discomfort and headaches.  He stated that he has lost a significant amount of weight.  Magnets in both processors were changed to a 2.  Pt felt the fit was secure and more comfortable.  He will continue to utilize safety lines.  Both processors were connected to programming software and feedback and BC direct were completed.  Pt reported good subjective benefit at this time.  Pt will continue regular use of Osia and contact the office if issues arise.      Bone Conduction Hearing Aid:  : Cochlear  Model: Osia   Side: Right  SN: [id number]    Backup SN: [id number]  Accessory: Mini Microphone        Recommendations:  Daily use of Osia  Follow up in one year  Contact office if programming issues arise  Contact Cochlear if device issues arise

## 2024-06-06 ENCOUNTER — PATIENT MESSAGE (OUTPATIENT)
Dept: FAMILY MEDICINE | Facility: CLINIC | Age: 34
End: 2024-06-06
Payer: COMMERCIAL

## 2024-06-06 DIAGNOSIS — F63.81 INTERMITTENT EXPLOSIVE DISORDER: ICD-10-CM

## 2024-06-06 DIAGNOSIS — F41.9 ANXIETY: Primary | ICD-10-CM

## 2024-06-19 ENCOUNTER — PATIENT MESSAGE (OUTPATIENT)
Dept: FAMILY MEDICINE | Facility: CLINIC | Age: 34
End: 2024-06-19
Payer: COMMERCIAL

## 2024-07-08 ENCOUNTER — PATIENT MESSAGE (OUTPATIENT)
Dept: FAMILY MEDICINE | Facility: CLINIC | Age: 34
End: 2024-07-08
Payer: COMMERCIAL

## 2024-07-08 DIAGNOSIS — R41.840 POOR CONCENTRATION: Primary | ICD-10-CM

## 2024-08-06 ENCOUNTER — OFFICE VISIT (OUTPATIENT)
Dept: FAMILY MEDICINE | Facility: CLINIC | Age: 34
End: 2024-08-06
Payer: COMMERCIAL

## 2024-08-06 VITALS
WEIGHT: 216.06 LBS | SYSTOLIC BLOOD PRESSURE: 98 MMHG | BODY MASS INDEX: 29.26 KG/M2 | HEIGHT: 72 IN | TEMPERATURE: 98 F | OXYGEN SATURATION: 98 % | DIASTOLIC BLOOD PRESSURE: 60 MMHG | HEART RATE: 66 BPM

## 2024-08-06 DIAGNOSIS — H90.6 MIXED CONDUCTIVE AND SENSORINEURAL HEARING LOSS OF BOTH EARS: ICD-10-CM

## 2024-08-06 DIAGNOSIS — F41.9 ANXIETY: ICD-10-CM

## 2024-08-06 DIAGNOSIS — K76.0 FATTY LIVER: ICD-10-CM

## 2024-08-06 DIAGNOSIS — F63.81 INTERMITTENT EXPLOSIVE DISORDER: ICD-10-CM

## 2024-08-06 DIAGNOSIS — G47.33 OSA ON CPAP: ICD-10-CM

## 2024-08-06 DIAGNOSIS — Z00.00 ANNUAL PHYSICAL EXAM: Primary | ICD-10-CM

## 2024-08-06 DIAGNOSIS — J45.20 MILD INTERMITTENT ASTHMA WITHOUT COMPLICATION: ICD-10-CM

## 2024-08-06 DIAGNOSIS — E66.3 OVERWEIGHT WITH BODY MASS INDEX (BMI) OF 29 TO 29.9 IN ADULT: ICD-10-CM

## 2024-08-06 DIAGNOSIS — K44.9 HIATAL HERNIA: ICD-10-CM

## 2024-08-06 DIAGNOSIS — Z96.21 STATUS POST PLACEMENT OF BONE ANCHORED HEARING AID (BAHA): ICD-10-CM

## 2024-08-06 DIAGNOSIS — N40.0 BENIGN PROSTATIC HYPERPLASIA WITHOUT LOWER URINARY TRACT SYMPTOMS: ICD-10-CM

## 2024-08-06 PROCEDURE — 3078F DIAST BP <80 MM HG: CPT | Mod: CPTII,S$GLB,, | Performed by: NURSE PRACTITIONER

## 2024-08-06 PROCEDURE — 3044F HG A1C LEVEL LT 7.0%: CPT | Mod: CPTII,S$GLB,, | Performed by: NURSE PRACTITIONER

## 2024-08-06 PROCEDURE — 1160F RVW MEDS BY RX/DR IN RCRD: CPT | Mod: CPTII,S$GLB,, | Performed by: NURSE PRACTITIONER

## 2024-08-06 PROCEDURE — 3074F SYST BP LT 130 MM HG: CPT | Mod: CPTII,S$GLB,, | Performed by: NURSE PRACTITIONER

## 2024-08-06 PROCEDURE — 99999 PR PBB SHADOW E&M-EST. PATIENT-LVL III: CPT | Mod: PBBFAC,,, | Performed by: NURSE PRACTITIONER

## 2024-08-06 PROCEDURE — 1159F MED LIST DOCD IN RCRD: CPT | Mod: CPTII,S$GLB,, | Performed by: NURSE PRACTITIONER

## 2024-08-06 PROCEDURE — 3008F BODY MASS INDEX DOCD: CPT | Mod: CPTII,S$GLB,, | Performed by: NURSE PRACTITIONER

## 2024-08-06 PROCEDURE — 99395 PREV VISIT EST AGE 18-39: CPT | Mod: S$GLB,,, | Performed by: NURSE PRACTITIONER

## 2024-08-22 ENCOUNTER — OFFICE VISIT (OUTPATIENT)
Dept: UROLOGY | Facility: CLINIC | Age: 34
End: 2024-08-22
Payer: COMMERCIAL

## 2024-08-22 VITALS
HEIGHT: 72 IN | BODY MASS INDEX: 29.23 KG/M2 | WEIGHT: 215.81 LBS | SYSTOLIC BLOOD PRESSURE: 122 MMHG | DIASTOLIC BLOOD PRESSURE: 82 MMHG | HEART RATE: 56 BPM

## 2024-08-22 DIAGNOSIS — N40.0 BENIGN PROSTATIC HYPERPLASIA WITHOUT LOWER URINARY TRACT SYMPTOMS: Primary | ICD-10-CM

## 2024-08-22 DIAGNOSIS — Z98.52 STATUS POST VASECTOMY: ICD-10-CM

## 2024-08-22 DIAGNOSIS — F63.81 INTERMITTENT EXPLOSIVE DISORDER: ICD-10-CM

## 2024-08-22 PROCEDURE — 3079F DIAST BP 80-89 MM HG: CPT | Mod: CPTII,S$GLB,, | Performed by: NURSE PRACTITIONER

## 2024-08-22 PROCEDURE — 99214 OFFICE O/P EST MOD 30 MIN: CPT | Mod: S$GLB,,, | Performed by: NURSE PRACTITIONER

## 2024-08-22 PROCEDURE — 3044F HG A1C LEVEL LT 7.0%: CPT | Mod: CPTII,S$GLB,, | Performed by: NURSE PRACTITIONER

## 2024-08-22 PROCEDURE — 1160F RVW MEDS BY RX/DR IN RCRD: CPT | Mod: CPTII,S$GLB,, | Performed by: NURSE PRACTITIONER

## 2024-08-22 PROCEDURE — 3008F BODY MASS INDEX DOCD: CPT | Mod: CPTII,S$GLB,, | Performed by: NURSE PRACTITIONER

## 2024-08-22 PROCEDURE — 99999 PR PBB SHADOW E&M-EST. PATIENT-LVL IV: CPT | Mod: PBBFAC,,, | Performed by: NURSE PRACTITIONER

## 2024-08-22 PROCEDURE — 3074F SYST BP LT 130 MM HG: CPT | Mod: CPTII,S$GLB,, | Performed by: NURSE PRACTITIONER

## 2024-08-22 PROCEDURE — 1159F MED LIST DOCD IN RCRD: CPT | Mod: CPTII,S$GLB,, | Performed by: NURSE PRACTITIONER

## 2024-08-22 NOTE — PATIENT INSTRUCTIONS
Post-vasectomy sperm count (drop specimen off to [facility] 2nd floor lab within a hour of collection).   Testosterone and estrogen in the morning before 10 AM.   Follow-up pending lab results.

## 2024-08-22 NOTE — PROGRESS NOTES
Subjective:       Patient ID: Marshal Baer is a 34 y.o. male.    Chief Complaint: Follow-up    Patient is a 35 yo WM who is here today for an annual urology exam. He has a hx of BPH. No longer taking tamsulosin at this time. Has been off medication for approximately 2 years now and doing fine. He is S/P vasectomy by Dr. Swann 12/2019. He reports never doing his post-vasectomy sperm count as directed at that time. He would like to move forward with having that evaluated at this time. He is also experiencing changes in his mood (explosive) and would like to evaluate his testosterone level to make sure it's not factor. Patient is here today with his wife.     Follow-up  This is a chronic (BPH) problem. The current episode started more than 1 year ago. The problem occurs daily. The problem has been rapidly improving. Pertinent negatives include no abdominal pain, change in bowel habit, chills, fever, headaches, nausea, swollen glands, urinary symptoms, vomiting or weakness. Nothing aggravates the symptoms. Treatments tried: tamsulosin. The treatment provided significant relief.     Review of Systems   Constitutional:  Negative for chills and fever.   Gastrointestinal:  Negative for abdominal pain, change in bowel habit, constipation, diarrhea, nausea and vomiting.   Genitourinary:  Negative for decreased urine volume, difficulty urinating, discharge, dysuria, flank pain, frequency, genital sores, hematuria, penile pain, penile swelling, scrotal swelling, testicular pain and urgency.   Neurological:  Negative for dizziness, weakness and headaches.   Psychiatric/Behavioral: Negative.           Objective:      Physical Exam  Vitals and nursing note reviewed.   Constitutional:       General: He is not in acute distress.     Appearance: He is well-developed. He is not ill-appearing.   HENT:      Head: Normocephalic and atraumatic.   Eyes:      Pupils: Pupils are equal, round, and reactive to light.    Cardiovascular:      Rate and Rhythm: Normal rate.   Pulmonary:      Effort: Pulmonary effort is normal. No respiratory distress.   Abdominal:      Palpations: Abdomen is soft.      Tenderness: There is no abdominal tenderness.   Musculoskeletal:         General: Normal range of motion.      Cervical back: Normal range of motion.   Skin:     General: Skin is warm and dry.   Neurological:      Mental Status: He is alert and oriented to person, place, and time.      Coordination: Coordination normal.   Psychiatric:         Mood and Affect: Mood normal.         Behavior: Behavior normal.         Thought Content: Thought content normal.         Judgment: Judgment normal.         Assessment:       Problem List Items Addressed This Visit          Psychiatric    Intermittent explosive disorder    Relevant Orders    Testosterone    ESTROGENS, TOTAL       Renal/    Benign prostatic hyperplasia without lower urinary tract symptoms - Primary    Relevant Orders    Testosterone    ESTROGENS, TOTAL     Other Visit Diagnoses       Status post vasectomy        Relevant Orders    Sperm Count, Post Vasectomy            Plan:           Marshal was seen today for follow-up.    Diagnoses and all orders for this visit:    Benign prostatic hyperplasia without lower urinary tract symptoms  -     Testosterone; Future  -     ESTROGENS, TOTAL; Future    Intermittent explosive disorder  -     Testosterone; Future  -     ESTROGENS, TOTAL; Future    Status post vasectomy  -     Sperm Count, Post Vasectomy; Future    Follow-up pending lab results.     Delvis Corey, DNP

## 2024-08-23 ENCOUNTER — TELEPHONE (OUTPATIENT)
Dept: UROLOGY | Facility: CLINIC | Age: 34
End: 2024-08-23
Payer: COMMERCIAL

## 2024-08-23 DIAGNOSIS — R79.89 LOW TESTOSTERONE IN MALE: Primary | ICD-10-CM

## 2024-08-28 ENCOUNTER — TELEPHONE (OUTPATIENT)
Dept: UROLOGY | Facility: CLINIC | Age: 34
End: 2024-08-28
Payer: COMMERCIAL

## 2024-08-28 NOTE — TELEPHONE ENCOUNTER
----- Message from Delvis Corey DNP sent at 8/28/2024  3:42 PM CDT -----  Please inform patient via telephone that his estradiol (a component of his estrogen) is slightly lower than normal at 9 pg/mL. This can also be addressed with your low testosterone with endocrinology.

## 2024-09-27 ENCOUNTER — LAB VISIT (OUTPATIENT)
Dept: LAB | Facility: HOSPITAL | Age: 34
End: 2024-09-27
Payer: COMMERCIAL

## 2024-09-27 DIAGNOSIS — Z98.52 STATUS POST VASECTOMY: ICD-10-CM

## 2024-09-27 PROCEDURE — 89320 SEMEN ANAL VOL/COUNT/MOT: CPT | Performed by: NURSE PRACTITIONER

## 2024-09-30 ENCOUNTER — TELEPHONE (OUTPATIENT)
Dept: UROLOGY | Facility: CLINIC | Age: 34
End: 2024-09-30
Payer: COMMERCIAL

## 2024-09-30 NOTE — TELEPHONE ENCOUNTER
----- Message from Delvis Corey DNP sent at 9/30/2024  3:35 PM CDT -----  Please inform patient via telephone that no sperm was noted on post-vasectomy sperm count analysis.

## 2024-10-04 LAB
SPECIMEN VOL SMN: 5 ML
SPERM P VAS # SMN: NORMAL M/ML

## 2024-12-02 ENCOUNTER — OFFICE VISIT (OUTPATIENT)
Dept: ENDOCRINOLOGY | Facility: CLINIC | Age: 34
End: 2024-12-02
Payer: COMMERCIAL

## 2024-12-02 ENCOUNTER — CLINICAL SUPPORT (OUTPATIENT)
Dept: PSYCHIATRY | Facility: CLINIC | Age: 34
End: 2024-12-02
Payer: COMMERCIAL

## 2024-12-02 VITALS
SYSTOLIC BLOOD PRESSURE: 120 MMHG | HEIGHT: 72 IN | BODY MASS INDEX: 30.5 KG/M2 | HEART RATE: 62 BPM | WEIGHT: 225.19 LBS | RESPIRATION RATE: 17 BRPM | DIASTOLIC BLOOD PRESSURE: 78 MMHG | OXYGEN SATURATION: 99 %

## 2024-12-02 DIAGNOSIS — R41.840 POOR CONCENTRATION: ICD-10-CM

## 2024-12-02 DIAGNOSIS — G47.33 OSA ON CPAP: ICD-10-CM

## 2024-12-02 DIAGNOSIS — R79.89 LOW TESTOSTERONE IN MALE: ICD-10-CM

## 2024-12-02 DIAGNOSIS — E66.3 OVERWEIGHT WITH BODY MASS INDEX (BMI) OF 29 TO 29.9 IN ADULT: Primary | ICD-10-CM

## 2024-12-02 PROCEDURE — 1160F RVW MEDS BY RX/DR IN RCRD: CPT | Mod: CPTII,S$GLB,, | Performed by: STUDENT IN AN ORGANIZED HEALTH CARE EDUCATION/TRAINING PROGRAM

## 2024-12-02 PROCEDURE — 3074F SYST BP LT 130 MM HG: CPT | Mod: CPTII,S$GLB,, | Performed by: STUDENT IN AN ORGANIZED HEALTH CARE EDUCATION/TRAINING PROGRAM

## 2024-12-02 PROCEDURE — 3078F DIAST BP <80 MM HG: CPT | Mod: CPTII,S$GLB,, | Performed by: STUDENT IN AN ORGANIZED HEALTH CARE EDUCATION/TRAINING PROGRAM

## 2024-12-02 PROCEDURE — 3044F HG A1C LEVEL LT 7.0%: CPT | Mod: CPTII,S$GLB,, | Performed by: STUDENT IN AN ORGANIZED HEALTH CARE EDUCATION/TRAINING PROGRAM

## 2024-12-02 PROCEDURE — 99204 OFFICE O/P NEW MOD 45 MIN: CPT | Mod: S$GLB,,, | Performed by: STUDENT IN AN ORGANIZED HEALTH CARE EDUCATION/TRAINING PROGRAM

## 2024-12-02 PROCEDURE — 99999 PR PBB SHADOW E&M-EST. PATIENT-LVL II: CPT | Mod: PBBFAC,,,

## 2024-12-02 PROCEDURE — 3008F BODY MASS INDEX DOCD: CPT | Mod: CPTII,S$GLB,, | Performed by: STUDENT IN AN ORGANIZED HEALTH CARE EDUCATION/TRAINING PROGRAM

## 2024-12-02 PROCEDURE — 99499 UNLISTED E&M SERVICE: CPT | Mod: S$GLB,,, | Performed by: PSYCHOLOGIST

## 2024-12-02 PROCEDURE — 99999 PR PBB SHADOW E&M-EST. PATIENT-LVL IV: CPT | Mod: PBBFAC,,, | Performed by: STUDENT IN AN ORGANIZED HEALTH CARE EDUCATION/TRAINING PROGRAM

## 2024-12-02 PROCEDURE — 1159F MED LIST DOCD IN RCRD: CPT | Mod: CPTII,S$GLB,, | Performed by: STUDENT IN AN ORGANIZED HEALTH CARE EDUCATION/TRAINING PROGRAM

## 2024-12-02 NOTE — PROGRESS NOTES
Subjective:      Patient ID: Marshal Baer is a 34 y.o. male.    Chief Complaint:  Low testosterone    History of Present Illness  This is a 34 y.o. male. with a past medical history of BMI 30, TED (resolved with weight loss) here for evaluation of low testosterone.      Low testosterone first noted in 2024    Libido: Decreased  ED: No  Morning erections: Yes, no change   Shaving frequency: No  Fatigue: Some episodes    Muscle strength: No change    He is a shift worker    Mood changes, irritability    He used to be on SSRI, until 2023    He denies use of anabolic steroids    History of TED: Yes, on CPAP before, better after losing weight    2 biological children.     Not interested in fertility.     Wt Readings from Last 10 Encounters:   12/02/24 102.2 kg (225 lb 3.2 oz)   08/22/24 97.9 kg (215 lb 13.3 oz)   08/06/24 98 kg (216 lb 0.8 oz)   07/12/24 97.5 kg (215 lb)   01/29/24 95.9 kg (211 lb 8.5 oz)   12/29/23 94.1 kg (207 lb 7.3 oz)   10/17/23 92.1 kg (203 lb)   10/05/23 92.9 kg (204 lb 12.9 oz)   10/02/23 92.1 kg (203 lb)   08/20/23 97.3 kg (214 lb 8.2 oz)         Review of Systems  As above    Social and family history reviewed  Current medications and allergies reviewed    Objective:   /78   Pulse 62   Resp 17   Ht 6' (1.829 m)   Wt 102.2 kg (225 lb 3.2 oz)   SpO2 99%   BMI 30.54 kg/m²   Physical Exam  Alert, oriented    BP Readings from Last 1 Encounters:   12/02/24 120/78      Wt Readings from Last 1 Encounters:   12/02/24 0823 102.2 kg (225 lb 3.2 oz)     Body mass index is 30.54 kg/m².    Lab Review:   Lab Results   Component Value Date    HGBA1C 4.8 07/30/2024     Lab Results   Component Value Date    CHOL 160 07/30/2024    HDL 52 07/30/2024    LDLCALC 94.6 07/30/2024    TRIG 67 07/30/2024    CHOLHDL 32.5 07/30/2024     Lab Results   Component Value Date     07/30/2024    K 4.9 07/30/2024     07/30/2024    CO2 28 07/30/2024    GLU 93 07/30/2024    BUN 19 07/30/2024     CREATININE 0.9 07/30/2024    CALCIUM 9.5 07/30/2024    PROT 6.8 07/30/2024    ALBUMIN 4.3 07/30/2024    BILITOT 1.0 07/30/2024    ALKPHOS 53 (L) 07/30/2024    AST 27 07/30/2024    ALT 39 07/30/2024    ANIONGAP 8 07/30/2024    ESTGFRAFRICA >60.0 04/08/2022    EGFRNONAA >60.0 04/08/2022    TSH 1.512 07/30/2024       All pertinent labs reviewed    Assessment and Plan     Low testosterone in male  One value on lower end, 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 but will have him re-check in the morning after a full night sleep while being off work  Check panel along with SHBG and potential secondary causes  I have a low suspicion for pathological hypogonadism, likely functional  Discussed correlation with weight, TED  Discussed resistance training as tolerated to aide with muscle mass maintenance which helps with body fat composition and testosterone levels    Plan  - Check 8 AM testosterone panel, FSH, LH, ferritin, PRL, estradiol      Overweight with body mass index (BMI) of 29 to 29.9 in adult  Discussed correlation with weight, TED and low testosterone  Discussed resistance training as tolerated to aide with muscle mass maintenance which helps with body fat composition and testosterone levels    TED on CPAP  Appears resolved since losing weight but discussed important of maintaining loss as TED can come back if weight is regained        Justin Mills MD  Endocrinology

## 2024-12-02 NOTE — PROGRESS NOTES
Adult ADHD Clinic Orientation Seminar   Orientation/Psychoeducation    Patient's attendance: Attended in Full     Seminar/Group Focus: ADHD Clinic Orientation Seminar  Target symptoms: ADHD    Site: [facility]  Clinical status of patient: Outpatient  No LOS. Billing Provider and Co-signer: Jenny Wing, PhD (see signature below)  Length of Service: 35 minutes    Seminar/Group Topic:  Behavioral Health  Seminar/Group Department: [address]  Seminar/Group Facilitators: Rosamaria Michaud LMSW; Valentina Chow  # of patients: 6    Interval history: Patient presented for the Adult ADHD Clinic orientation seminar. The seminar provided information regarding guidelines and structure of assessment, diagnosis, and treatment in this clinic.  Diagnosis:   1. Poor concentration  Ambulatory referral/consult to Psychiatry        Patient's response: Accepting  Progress toward goals and other mental status changes: As expected    Plan: Patient will indicate whether to proceed with the Adult ADHD Clinic pre-screen  Return to clinic: as scheduled            Jenny Wing, PhD  Clinical Psychologist

## 2024-12-02 NOTE — ASSESSMENT & PLAN NOTE
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

## 2024-12-02 NOTE — ASSESSMENT & PLAN NOTE
Appears resolved since losing weight but discussed important of maintaining loss as TED can come back if weight is regained

## 2024-12-02 NOTE — ASSESSMENT & PLAN NOTE
Discussed correlation with weight, TED and low testosterone  Discussed resistance training as tolerated to aide with muscle mass maintenance which helps with body fat composition and testosterone levels

## 2024-12-03 ENCOUNTER — PATIENT MESSAGE (OUTPATIENT)
Dept: ENDOCRINOLOGY | Facility: CLINIC | Age: 34
End: 2024-12-03
Payer: COMMERCIAL

## 2024-12-03 ENCOUNTER — PATIENT MESSAGE (OUTPATIENT)
Dept: PSYCHIATRY | Facility: CLINIC | Age: 34
End: 2024-12-03
Payer: COMMERCIAL

## 2024-12-11 ENCOUNTER — PATIENT MESSAGE (OUTPATIENT)
Dept: ENDOCRINOLOGY | Facility: CLINIC | Age: 34
End: 2024-12-11
Payer: COMMERCIAL

## 2024-12-16 ENCOUNTER — PATIENT MESSAGE (OUTPATIENT)
Dept: PSYCHIATRY | Facility: CLINIC | Age: 34
End: 2024-12-16
Payer: COMMERCIAL

## 2024-12-16 ENCOUNTER — CLINICAL SUPPORT (OUTPATIENT)
Dept: PSYCHIATRY | Facility: CLINIC | Age: 34
End: 2024-12-16
Payer: COMMERCIAL

## 2024-12-16 DIAGNOSIS — Z00.8 ENCOUNTER FOR PSYCHOLOGICAL EVALUATION: Primary | ICD-10-CM

## 2024-12-16 PROCEDURE — 99499 UNLISTED E&M SERVICE: CPT | Mod: 95,,,
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
## 2025-02-04 ENCOUNTER — OFFICE VISIT (OUTPATIENT)
Dept: PSYCHIATRY | Facility: CLINIC | Age: 35
End: 2025-02-04
Payer: COMMERCIAL

## 2025-02-04 VITALS
HEART RATE: 65 BPM | SYSTOLIC BLOOD PRESSURE: 122 MMHG | WEIGHT: 225.5 LBS | DIASTOLIC BLOOD PRESSURE: 64 MMHG | BODY MASS INDEX: 30.59 KG/M2

## 2025-02-04 DIAGNOSIS — F90.0 ATTENTION DEFICIT HYPERACTIVITY DISORDER (ADHD), PREDOMINANTLY INATTENTIVE TYPE: Primary | ICD-10-CM

## 2025-02-04 DIAGNOSIS — R45.4 DIFFICULTY CONTROLLING ANGER: ICD-10-CM

## 2025-02-04 PROCEDURE — 99999 PR PBB SHADOW E&M-EST. PATIENT-LVL II: CPT | Mod: PBBFAC,,, | Performed by: NURSE PRACTITIONER

## 2025-02-04 PROCEDURE — 3078F DIAST BP <80 MM HG: CPT | Mod: CPTII,S$GLB,, | Performed by: NURSE PRACTITIONER

## 2025-02-04 PROCEDURE — 90792 PSYCH DIAG EVAL W/MED SRVCS: CPT | Mod: S$GLB,,, | Performed by: NURSE PRACTITIONER

## 2025-02-04 PROCEDURE — 3074F SYST BP LT 130 MM HG: CPT | Mod: CPTII,S$GLB,, | Performed by: NURSE PRACTITIONER

## 2025-02-04 RX ORDER — METHYLPHENIDATE HYDROCHLORIDE 36 MG/1
36 TABLET ORAL EVERY MORNING
Qty: 30 TABLET | Refills: 0 | Status: SHIPPED | OUTPATIENT
Start: 2025-02-04 | End: 2025-03-08
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
## 2025-02-11 ENCOUNTER — PATIENT MESSAGE (OUTPATIENT)
Dept: FAMILY MEDICINE | Facility: CLINIC | Age: 35
End: 2025-02-11
Payer: COMMERCIAL

## 2025-02-13 ENCOUNTER — OFFICE VISIT (OUTPATIENT)
Dept: FAMILY MEDICINE | Facility: CLINIC | Age: 35
End: 2025-02-13
Payer: COMMERCIAL

## 2025-02-13 VITALS
BODY MASS INDEX: 30.62 KG/M2 | HEART RATE: 76 BPM | SYSTOLIC BLOOD PRESSURE: 108 MMHG | DIASTOLIC BLOOD PRESSURE: 76 MMHG | WEIGHT: 226.06 LBS | TEMPERATURE: 98 F | OXYGEN SATURATION: 98 % | HEIGHT: 72 IN

## 2025-02-13 DIAGNOSIS — K76.0 FATTY LIVER: ICD-10-CM

## 2025-02-13 DIAGNOSIS — S39.012A STRAIN OF LUMBAR REGION, INITIAL ENCOUNTER: ICD-10-CM

## 2025-02-13 DIAGNOSIS — E66.811 CLASS 1 OBESITY DUE TO EXCESS CALORIES WITH SERIOUS COMORBIDITY AND BODY MASS INDEX (BMI) OF 30.0 TO 30.9 IN ADULT: Primary | ICD-10-CM

## 2025-02-13 DIAGNOSIS — F90.0 ATTENTION DEFICIT HYPERACTIVITY DISORDER (ADHD), PREDOMINANTLY INATTENTIVE TYPE: ICD-10-CM

## 2025-02-13 DIAGNOSIS — G47.33 OSA (OBSTRUCTIVE SLEEP APNEA): ICD-10-CM

## 2025-02-13 DIAGNOSIS — E66.09 CLASS 1 OBESITY DUE TO EXCESS CALORIES WITH SERIOUS COMORBIDITY AND BODY MASS INDEX (BMI) OF 30.0 TO 30.9 IN ADULT: Primary | ICD-10-CM

## 2025-02-13 PROCEDURE — 99999 PR PBB SHADOW E&M-EST. PATIENT-LVL IV: CPT | Mod: PBBFAC,,, | Performed by: NURSE PRACTITIONER

## 2025-02-13 RX ORDER — INDOMETHACIN 50 MG/1
50 CAPSULE ORAL 3 TIMES DAILY PRN
Qty: 30 CAPSULE | Refills: 0 | Status: SHIPPED | OUTPATIENT
Start: 2025-02-13

## 2025-02-13 RX ORDER — TIRZEPATIDE 2.5 MG/.5ML
2.5 INJECTION, SOLUTION SUBCUTANEOUS
Qty: 4 PEN | Refills: 0 | Status: SHIPPED | OUTPATIENT
Start: 2025-02-13

## 2025-02-13 RX ORDER — METHOCARBAMOL 750 MG/1
750 TABLET, FILM COATED ORAL 3 TIMES DAILY PRN
Qty: 30 TABLET | Refills: 0 | Status: SHIPPED | OUTPATIENT
Start: 2025-02-13 | End: 2025-02-23

## 2025-02-13 NOTE — PROGRESS NOTES
Subjective:       Patient ID: Marshal Baer is a 34 y.o. male.    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    Obesity  Body mass index is 30.66 kg/m².  Was on Ozempic in March 2023  Lost 44 pounds on Ozempic  OFF Ozempic at end of June 2023 due to INSURANCE NO LONGER COVERING.  Keep the weight loss off from June 2023 to August 2024 and slowly gained weight back.  Currently 226 pounds @8-10 pound weight gain.  BMI 30.66  RISK FACTORS include Fatty Liver and TED (Sleep study 11/11/2022: AHI 6, mild TED)  Will start Zepbound 2.5 mg injection weekly x 4 weeks, then 5 mg injectijon x 8 weeks  Patient can NOT TAKE oral weight loss amphetamines because he is already on ADHD medication methyphenidate by [facility].  Follow up in 12 weeks.      Fatty Liver  03/23/2022 CT abdomen pelvis:  Diffuse hepatic steatosis similar to prior exams.  7/10/2018 hepatitis C negative  Patient is working on weight loss - see note above.  Stable.     Obstructive Sleep Apnea  Sleep study 11/11/2022: AHI 6, mild TED  WAS on CPAP nightly but not using since weight loss in 2023.  Reports sleep symptoms recurring since recent weight gain  See note above.        Low Back Pain  Started on yesterday morning  No known injury or trauma  noted  Does report cutting grass this morning but started before this activity.  Pain is across the lower back  Pain worsens with activity and can go up or down back depending on position.  Rates pain 3/10.  Reports motrin/tylenol with little relief.   Biofreeze last night with small amount of relief.  Patient does have Indomethacin 50 mg tablets at home but only a few left  Advised to take indomethacin 50 mg three times daily  Robaxin 750 mg TID prn muscle relaxer  Continue biofreeze  Warm moist compresses TID.  Return if worsening or no improvement.           Vitals:    02/13/25 1427   BP: 108/76   BP Location: Left arm   Patient Position: Sitting   Pulse: 76   Temp: 97.7 °F (36.5 °C)   TempSrc: Temporal   SpO2: 98%   Weight: 102.6 kg (226 lb 1.3 oz)   Height: 6' (1.829 m)         Diagnoses this Encounter:         ICD-10-CM ICD-9-CM   1. Class 1 obesity due to excess calories with serious comorbidity and body mass index (BMI) of 30.0 to 30.9 in adult  E66.811 278.00    E66.09 V85.30    Z68.30    2. TED (obstructive sleep apnea)  G47.33 327.23   3. Fatty liver  K76.0 571.8   4. Attention deficit hyperactivity disorder (ADHD), predominantly inattentive type  F90.0 314.00   5. Strain of lumbar region, initial encounter  S39.012A 847.2       Orders Placed This Encounter    tirzepatide, weight loss, (ZEPBOUND) 2.5 mg/0.5 mL PnIj    indomethacin (INDOCIN) 50 MG capsule    methocarbamoL (ROBAXIN) 750 MG Tab        Follow up in about 12 weeks (around 5/8/2025) for follow up.     Patient's Medications   New Prescriptions    METHOCARBAMOL (ROBAXIN) 750 MG TAB    Take 1 tablet (750 mg total) by mouth 3 (three) times daily as needed (back pain).    TIRZEPATIDE, WEIGHT LOSS, (ZEPBOUND) 2.5 MG/0.5 ML PNIJ    Inject 2.5 mg into the skin every 7 days.   Previous Medications    ALBUTEROL (PROAIR HFA) 90 MCG/ACTUATION INHALER    Inhale 2 puffs into the lungs every 6 (six) hours as needed for Wheezing. Rescue    FLUTICASONE  PROPION-SALMETEROL 115-21 MCG/DOSE (ADVAIR HFA) 115-21 MCG/ACTUATION HFAA INHALER    Inhale 2 puffs into the lungs every 12 (twelve) hours. Controller    METHYLPHENIDATE HCL 36 MG CR TABLET    Take 1 tablet (36 mg total) by mouth every morning.    MONTELUKAST (SINGULAIR) 10 MG TABLET    Take 1 tablet (10 mg total) by mouth every evening.   Modified Medications    Modified Medication Previous Medication    INDOMETHACIN (INDOCIN) 50 MG CAPSULE indomethacin (INDOCIN) 50 MG capsule       Take 1 capsule (50 mg total) by mouth 3 (three) times daily as needed (back pain).    Take 1 capsule (50 mg total) by mouth 3 (three) times daily as needed (back pain).   Discontinued Medications    No medications on file         Review of Systems   Constitutional:  Positive for appetite change and fatigue.   Respiratory: Negative.     Cardiovascular: Negative.    Gastrointestinal: Negative.    Psychiatric/Behavioral:  Positive for sleep disturbance.          Objective:        Physical Exam  Constitutional:       General: He is not in acute distress.     Appearance: Normal appearance. He is not toxic-appearing or diaphoretic.      Comments: Body mass index is 30.66 kg/m².       Cardiovascular:      Rate and Rhythm: Normal rate and regular rhythm.      Heart sounds: Normal heart sounds. No murmur heard.  Pulmonary:      Effort: Pulmonary effort is normal. No respiratory distress.      Breath sounds: Normal breath sounds.   Abdominal:      General: There is no distension.   Neurological:      Mental Status: He is alert and oriented to person, place, and time.   Psychiatric:         Mood and Affect: Mood normal.         Behavior: Behavior normal.             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    Benign prostatic hyperplasia with urinary frequency 12/10/2020    Class 2 obesity due to excess  calories without serious comorbidity with body mass index (BMI) of 35.0 to 35.9 in adult 05/07/2020    - discussed recommendation for diet and cardiovascular exercise - counseling on lifestyle modifications for risk factor reduction    Deafness in right ear     since birth    Elevated LFTs 01/04/2021    Lab Results Component Value Date  ALT 44 04/08/2022  AST 34 04/08/2022  ALKPHOS 79 04/08/2022  BILITOT 0.4 04/08/2022  - 7/10/2018 hepatitis C negative - 3/23/22 CT abd/pelvis: fatty liver disease normal - likely related with diet and lifestyle - pt denies alcohol use - discussed recommendation for diet and cardiovascular exercise - counseling on lifestyle modifications for risk factor reduction -    Epididymal cyst 04/19/2021    Noted on u/s dated 8/17/2016.     Fatty liver 04/12/2022    - 03/23/2022 CT abdomen pelvis:  Diffuse hepatic steatosis similar to prior exams. - 7/10/2018 hepatitis C negative Lab Results Component Value Date  ALT 44 04/08/2022  AST 34 04/08/2022  ALKPHOS 79 04/08/2022  BILITOT 0.4 04/08/2022  - discussed recommendation for diet and cardiovascular exercise - counseling on lifestyle modifications for risk factor reduction    Gastroesophageal reflux disease without esophagitis 01/30/2020    - followed by gastroenterology - 08/07/2020 EGD:  Small hiatal hernia.  Negative H pylori.  No dysplasia.  No esophagitis    Hiatal hernia with gastroesophageal reflux disease and esophagitis     History of peptic ulcer 07/10/2020    Intermittent explosive disorder 01/31/2020    Varicocele 08/17/2016       Past Surgical History:   Procedure Laterality Date    COLONOSCOPY N/A 1/12/2021    Procedure: COLONOSCOPY;  Surgeon: Valentina Patton MD;  Location: [facility];  Service: Endoscopy;  Laterality: N/A;    ENDOSCOPY  11/2019    ESOPHAGOGASTRODUODENOSCOPY N/A 8/7/2020    Procedure: EGD (ESOPHAGOGASTRODUODENOSCOPY);  Surgeon: Valentina Patton MD;  Location: [facility];  Service: Endoscopy;  Laterality:  N/A;    IMPLANTATION OF BONE ANCHORED HEARING AID (BAHA) Right 11/21/2022    Procedure: INSERTION, BAHA;  Surgeon: Edu Mistry MD;  Location: [facility] OR [address] [place] [address];  Service: ENT;  Laterality: Right;    VASECTOMY  12/27/2019       Family History   Problem Relation Name Age of Onset    Heart disease Mother Lizz         aortic calcifications    Hyperlipidemia Mother Lizz     Diabetes Mother Lizz     Cancer Mother Lizz         lung cancer 55; thyroid cancer 55, colon cancer 55, lesion on the liver    Hypertension Mother Lizz     Lung cancer Mother Lizz     Hyperlipidemia Father Stefan     Heart disease Father Stefan         MI - age 30 - stents placed; CHF    Hypertension Father Stefan     Diabetes Father Stefan     Alcohol abuse Father Stefan     Heart disease Paternal Uncle  42        Heart Attack in 1999    Cancer Maternal Grandmother Aditi         Breast  cancer 2006    No Known Problems Maternal Grandfather      Prostate cancer Neg Hx      Kidney disease Neg Hx         Social History     Socioeconomic History    Marital status:     Number of children: 2   Occupational History     Employer: [employer]    Occupation: chemical    Tobacco Use    Smoking status: Never     Passive exposure: Past    Smokeless tobacco: Never   Substance and Sexual Activity    Alcohol use: Yes     Comment: rarely    Drug use: No    Sexual activity: Yes     Partners: Female     Social Drivers of Health     Financial Resource Strain: Low Risk  (2/12/2025)    Overall Financial Resource Strain (CARDIA)     Difficulty of Paying Living Expenses: Not hard at all   Food Insecurity: No Food Insecurity (2/12/2025)    Hunger Vital Sign     Worried About Running Out of Food in the Last Year: Never true     Ran Out of Food in the Last Year: Never true   Transportation Needs: No Transportation Needs (2/12/2025)    PRAPARE - Transportation     Lack of Transportation (Medical): No     Lack of Transportation  (Non-Medical): No   Physical Activity: Insufficiently Active (2/12/2025)    Exercise Vital Sign     Days of Exercise per Week: 3 days     Minutes of Exercise per Session: 30 min   Stress: No Stress Concern Present (2/12/2025)    [facility] - Occupational Stress Questionnaire     Feeling of Stress : Only a little   Housing Stability: Low Risk  (2/12/2025)    Housing Stability Vital Sign     Unable to Pay for Housing in the Last Year: No     Number of Times Moved in the Last Year: 0     Homeless in the Last Year: No

## 2025-03-06 ENCOUNTER — PATIENT MESSAGE (OUTPATIENT)
Dept: FAMILY MEDICINE | Facility: CLINIC | Age: 35
End: 2025-03-06
Payer: COMMERCIAL

## 2025-03-06 DIAGNOSIS — F90.0 ATTENTION DEFICIT HYPERACTIVITY DISORDER (ADHD), PREDOMINANTLY INATTENTIVE TYPE: ICD-10-CM

## 2025-03-06 RX ORDER — TIRZEPATIDE 5 MG/.5ML
5 INJECTION, SOLUTION SUBCUTANEOUS
Qty: 4 PEN | Refills: 1 | Status: SHIPPED | OUTPATIENT
Start: 2025-03-06

## 2025-03-06 RX ORDER — METHYLPHENIDATE HYDROCHLORIDE 36 MG/1
36 TABLET ORAL EVERY MORNING
Qty: 30 TABLET | Refills: 0 | Status: SHIPPED | OUTPATIENT
Start: 2025-03-06 | End: 2025-04-06

## 2025-03-10 ENCOUNTER — OFFICE VISIT (OUTPATIENT)
Dept: PSYCHIATRY | Facility: CLINIC | Age: 35
End: 2025-03-10
Payer: COMMERCIAL

## 2025-03-10 DIAGNOSIS — R45.4 DIFFICULTY CONTROLLING ANGER: ICD-10-CM

## 2025-03-10 DIAGNOSIS — F90.0 ATTENTION DEFICIT HYPERACTIVITY DISORDER (ADHD), PREDOMINANTLY INATTENTIVE TYPE: Primary | ICD-10-CM

## 2025-03-10 PROCEDURE — 98005 SYNCH AUDIO-VIDEO EST LOW 20: CPT | Mod: 95,,, | Performed by: NURSE PRACTITIONER

## 2025-03-10 RX ORDER — METHYLPHENIDATE HYDROCHLORIDE 36 MG/1
36 TABLET ORAL EVERY MORNING
Qty: 30 TABLET | Refills: 0 | Status: SHIPPED | OUTPATIENT
Start: 2025-04-08 | End: 2025-05-08

## 2025-03-10 RX ORDER — METHYLPHENIDATE HYDROCHLORIDE 36 MG/1
36 TABLET ORAL EVERY MORNING
Qty: 30 TABLET | Refills: 0 | Status: SHIPPED | OUTPATIENT
Start: 2025-05-06 | End: 2025-06-05

## 2025-03-10 NOTE — PROGRESS NOTES
"Outpatient Psychiatry Follow-Up Visit (MD/NP)    3/10/2025    Clinical Status of Patient:  Outpatient (Ambulatory)    Chief Complaint:  Marshal Baer is a 34 y.o. male who presents today for follow-up of mood disorder and attention problems.  Met with patient.      The patient location is: [place]   The chief complaint leading to consultation is: mood/ADHD    Visit type: audiovisual    Face to Face time with patient: 8 minutes  17 minutes of total time spent on the encounter, which includes face to face time and non-face to face time preparing to see the patient (eg, review of tests), Obtaining and/or reviewing separately obtained history, Documenting clinical information in the electronic or other health record, Independently interpreting results (not separately reported) and communicating results to the patient/family/caregiver, or Care coordination (not separately reported).     Each patient to whom he or she provides medical services by telemedicine is:  (1) informed of the relationship between the physician and patient and the respective role of any other health care provider with respect to management of the patient; and (2) notified that he or she may decline to receive medical services by telemedicine and may withdraw from such care at any time.    Notes:       Interval History and Content of Current Session:    Social/medical updates -- resides with wife and 2 children. Employed as .     "They've been really good actually."    Mood -- stable since last visit. Notes significant improvement in ability to control anger. No "major outbursts" over the past few weeks. Feels that he is better able to control emotions when he feels frustrated, now passes quickly. No depression, no anhedonia. No hypomania or vivek. No thoughts of death or SI.   Anxiety -- no unregulated worry or panic  Attention -- notes improvement since starting Concerta. Less forgetful, notes that he does not misplace " items as as frequently. Better able to maintain focus in conversation, more attentive overall.   Psychosis -- no  Sleep -- largely adequate, no change from baseline  Energy -- adequate  Appetite -- decreased in context of tirzepatide prescription    Substance use -- rare ETOH. Denies other substance use    Medications:    Concerta 36 mg daily -- compliant, denies SE    Previous medication trials: fluoxetine, escitalopram, Concerta/Ritalin    Brief synopsis:  Inattentive sx improved, in control of anger, denies SI/HI/AVH      Review of Systems   PSYCHIATRIC: Pertinant items are noted in the narrative.  CONSTITUTIONAL: See above.   MUSCULOSKELETAL: No pain or stiffness of the joints.  NEUROLOGIC: No weakness, sensory changes, seizures, confusion, memory loss, tremor or other abnormal movements.  GASTROINTESTINAL: No nausea, vomiting, pain, constipation or diarrhea.    Past Medical, Family and Social History: The patient's past medical, family and social history have been reviewed and updated as appropriate within the electronic medical record - see encounter notes.    Compliance: see above    Side effects: see above    Risk Parameters:  Patient reports no suicidal ideation  Patient reports no homicidal ideation  Patient reports no self-injurious behavior  Patient reports no violent behavior    Exam (detailed: at least 9 elements; comprehensive: all 15 elements)   Constitutional  Vitals:  Most recent vital signs, dated less than 90 days prior to this appointment, were reviewed.   There were no vitals filed for this visit.     General:  unremarkable, age appropriate     Musculoskeletal  Muscle Strength/Tone:  not examined, telemedicine    Gait & Station:  BRANDIN telemedicine      Psychiatric  Speech:  no latency; no press   Mood & Affect:  euthymic  full   Thought Process:  normal and logical   Associations:  intact   Thought Content:  normal, no suicidality, no homicidality, delusions, or paranoia   Insight:  intact    Judgement: behavior is adequate to circumstances   Orientation:  grossly intact   Memory: intact for content of interview   Language: grossly intact   Attention Span & Concentration:  able to focus   Fund of Knowledge:  intact and appropriate to age and level of education     Assessment and Diagnosis   Status/Progress: Based on the examination today, the patient's problem(s) is/are improved and adequately but not ideally controlled.  New problems have not been presented today.   Diagnostic uncertainty are complicating management of the primary condition.  The working differential for this patient includes see below.     General Impression: Marshal Baer is a 34 y.o. male with a psychiatric hx of intermittent explosive disorder and childhood diagnosed ADHD presenting with concerns regarding poor attention, poor emotional regulation, and difficulty controlling anger. Though patient does not meet full criteria for autism spectrum disorder, he presents with clinically significant symptoms of poor social-emotional reciprocity, and difficulty in understanding and maintaining relationships. Medical hx significant for asthma. Family MH hx is significant for substance/alcohol abuse in parents. Past psychiatric treatment includes treatment of ADHD with Ritalin/Concerta from 5th - 12th grade. No hx of psychiatric hospitalizations. No hx of suicide attempts. 1x episode of violence in 2008, charged with simple battery. No hx of substance abuse. Lives with wife and 2 children. Employed as .     03/10/25 -- inattentive sx improved since starting Concerta. Also notes improved ability to control anger, less easily frustrated. Continue Concerta 36 mg daily.      ICD-10-CM ICD-9-CM   1. Attention deficit hyperactivity disorder (ADHD), predominantly inattentive type  F90.0 314.00   2. Difficulty controlling anger  R45.4 799.29       Intervention/Counseling/Treatment Plan   Reviewed patient's current symptoms  and medication regimen  Continue medication regimen as prescribed  Plan to recheck BP/HR during future in-person visits    Medication Management  Prescription drug management was employed during the encounter, as medications were prescribed, or considered but not prescribed.   [facility] reviewed  The risks and benefits of medication were discussed with the patient.  Possible expectable adverse effects of any current or proposed individual psychotropic agents were discussed with this patient.  Counseling was provided on the importance of full compliance with any prescribed medication.  Detailed instructions were provided to the patient regarding the administration of any prescribed medication.  The most common and rare side effects were reviewed, including discussion of potential permanent movement disorder and disfiguring conditions if applicable to any prescribed medication  Patient voiced understanding    Return to Clinic: 3 months

## 2025-04-03 DIAGNOSIS — J31.0 CHRONIC RHINITIS: ICD-10-CM

## 2025-04-03 DIAGNOSIS — F90.0 ATTENTION DEFICIT HYPERACTIVITY DISORDER (ADHD), PREDOMINANTLY INATTENTIVE TYPE: ICD-10-CM

## 2025-04-03 RX ORDER — METHYLPHENIDATE HYDROCHLORIDE 36 MG/1
36 TABLET ORAL EVERY MORNING
Qty: 30 TABLET | Refills: 0 | OUTPATIENT
Start: 2025-04-03 | End: 2025-05-03

## 2025-04-03 RX ORDER — MONTELUKAST SODIUM 10 MG/1
10 TABLET ORAL NIGHTLY
Qty: 30 TABLET | Refills: 11 | Status: SHIPPED | OUTPATIENT
Start: 2025-04-03

## 2025-05-07 ENCOUNTER — OFFICE VISIT (OUTPATIENT)
Dept: FAMILY MEDICINE | Facility: CLINIC | Age: 35
End: 2025-05-07
Payer: COMMERCIAL

## 2025-05-07 ENCOUNTER — PATIENT MESSAGE (OUTPATIENT)
Dept: FAMILY MEDICINE | Facility: CLINIC | Age: 35
End: 2025-05-07

## 2025-05-07 VITALS
SYSTOLIC BLOOD PRESSURE: 100 MMHG | TEMPERATURE: 98 F | HEART RATE: 72 BPM | HEIGHT: 72 IN | DIASTOLIC BLOOD PRESSURE: 62 MMHG | WEIGHT: 187.63 LBS | BODY MASS INDEX: 25.41 KG/M2 | OXYGEN SATURATION: 96 %

## 2025-05-07 DIAGNOSIS — Z13.220 SCREENING CHOLESTEROL LEVEL: ICD-10-CM

## 2025-05-07 DIAGNOSIS — K76.0 FATTY LIVER: ICD-10-CM

## 2025-05-07 DIAGNOSIS — G47.33 OSA (OBSTRUCTIVE SLEEP APNEA): ICD-10-CM

## 2025-05-07 DIAGNOSIS — Z86.39 HISTORY OF OBESITY: Primary | ICD-10-CM

## 2025-05-07 DIAGNOSIS — Z13.0 SCREENING FOR DEFICIENCY ANEMIA: ICD-10-CM

## 2025-05-07 DIAGNOSIS — Z00.00 ENCOUNTER FOR BLOOD TEST FOR ROUTINE GENERAL PHYSICAL EXAMINATION: ICD-10-CM

## 2025-05-07 DIAGNOSIS — E66.3 OVERWEIGHT WITH BODY MASS INDEX (BMI) OF 25 TO 25.9 IN ADULT: ICD-10-CM

## 2025-05-07 DIAGNOSIS — Z13.29 THYROID DISORDER SCREEN: ICD-10-CM

## 2025-05-07 DIAGNOSIS — Z13.1 DIABETES MELLITUS SCREENING: ICD-10-CM

## 2025-05-07 PROCEDURE — 99999 PR PBB SHADOW E&M-EST. PATIENT-LVL III: CPT | Mod: PBBFAC,,, | Performed by: NURSE PRACTITIONER

## 2025-05-07 RX ORDER — TIRZEPATIDE 5 MG/.5ML
5 INJECTION, SOLUTION SUBCUTANEOUS
Qty: 4 PEN | Refills: 2 | Status: SHIPPED | OUTPATIENT
Start: 2025-05-07

## 2025-05-07 NOTE — PROGRESS NOTES
Subjective:       Patient ID: Marshal Baer is a 34 y.o. male.    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 All other chronic problems reviewed at August 2024 visit - due to repeat labs/wellness August 2025.     History of Obesity  Was on Ozempic in March 2023  Lost 44 pounds on Ozempic  OFF Ozempic at end of June 2023 due to INSURANCE NO LONGER COVERING.  Kept the weight loss off from June 2023 to August 2024 and slowly gained weight back.  2/13/2025 visit:  Currently 226 pounds @8-10 pound weight gain.  BMI 30.66  RISK FACTORS include Fatty Liver and TED (Sleep study 11/11/2022: AHI 6, mild TED)  Will start Zepbound 2.5 mg injection weekly x 4 weeks, then 5 mg injectijon x 8 weeks  Patient can NOT TAKE oral weight loss amphetamines because he is already on ADHD medication methyphenidate by [facility].  Follow up in 12 weeks.  5/7/2025 visit:  Lost 39 pounds since 2/13/25 on Mounjaro 5 mg dose  Body mass index is 25.44 kg/m².  Will decrease the freqency of dose for maintenance therapy  DECREASE dose to Mounjaro 5 mg injection every 10 days instead of 7 days. If still losing weight - can continue to titrate the length of injections apart to maintain current weight - just message me when increasing the amount of days so I can trend - can go as far as every 21 to 28 days apart between injections.  Recheck in August.    General weight loss/lifestyle modification strategies discussed (elicit  support from others; identify saboteurs; non-food rewards, etc).  Diet interventions: high protein diet, portion control.  Informal exercise measures discussed, e.g. taking stairs instead of elevator.  Regular aerobic exercise program discussed.  Medication: zepbound 5 mg dose - titrating now for maintenance therapy.  Follow up in August.           Fatty Liver  03/23/2022 CT abdomen pelvis:  Diffuse hepatic steatosis similar to prior exams.  7/10/2018 hepatitis C negative  Patient is working on weight loss - see note above.  Stable.       Obstructive Sleep Apnea  Sleep study 11/11/2022: AHI 6, mild TED  WAS on CPAP nightly but not using since weight loss in 2023.  Reports sleep symptoms recurring with weight gain but again resolved with recent weight loss                    Vitals:    05/07/25 1434   BP: 100/62   BP Location: Right arm   Patient Position: Sitting   Pulse: 72   Temp: 97.7 °F (36.5 °C)   TempSrc: Temporal   SpO2: 96%   Weight: 85.1 kg (187 lb 9.8 oz)   Height: 6' (1.829 m)         Diagnoses this Encounter:         ICD-10-CM ICD-9-CM   1. History of obesity  Z86.39 V12.29   2. Overweight with body mass index (BMI) of 25 to 25.9 in adult  E66.3 278.02    Z68.25 V85.21   3. Fatty liver  K76.0 571.8   4. TED (obstructive sleep apnea)  G47.33 327.23   5. Encounter for blood test for routine general physical examination  Z00.00 V72.62   6. Screening for deficiency anemia  Z13.0 V78.1   7. Thyroid disorder screen  Z13.29 V77.0   8. Screening cholesterol level  Z13.220 V77.91   9. Diabetes mellitus screening  Z13.1 V77.1       Orders Placed This Encounter    CBC Auto Differential    Comprehensive Metabolic Panel    Hemoglobin A1C    Lipid Panel    TSH    tirzepatide, weight loss, (ZEPBOUND) 5 mg/0.5 mL PnIj        Follow up in about 3 months (around 8/7/2025) for fasting labs and WELLNESS EXAM.     Patient's Medications   New Prescriptions    No medications on file   Previous Medications    ALBUTEROL (PROAIR  HFA) 90 MCG/ACTUATION INHALER    Inhale 2 puffs into the lungs every 6 (six) hours as needed for Wheezing. Rescue    METHYLPHENIDATE HCL 36 MG CR TABLET    Take 1 tablet (36 mg total) by mouth every morning.    METHYLPHENIDATE HCL 36 MG CR TABLET    Take 1 tablet (36 mg total) by mouth every morning.    MONTELUKAST (SINGULAIR) 10 MG TABLET    Take 1 tablet (10 mg total) by mouth every evening.   Modified Medications    Modified Medication Previous Medication    TIRZEPATIDE, WEIGHT LOSS, (ZEPBOUND) 5 MG/0.5 ML PNIJ tirzepatide, weight loss, (ZEPBOUND) 5 mg/0.5 mL PnIj       Inject 5 mg into the skin every 7 days.    Inject 5 mg into the skin every 7 days.   Discontinued Medications    FLUTICASONE PROPION-SALMETEROL 115-21 MCG/DOSE (ADVAIR HFA) 115-21 MCG/ACTUATION HFAA INHALER    Inhale 2 puffs into the lungs every 12 (twelve) hours. Controller    INDOMETHACIN (INDOCIN) 50 MG CAPSULE    Take 1 capsule (50 mg total) by mouth 3 (three) times daily as needed (back pain).         Review of Systems   HENT: Negative.     Respiratory: Negative.     Cardiovascular: Negative.    Gastrointestinal: Negative.          Objective:        Physical Exam  Constitutional:       General: He is not in acute distress.     Appearance: Normal appearance. He is normal weight. He is not toxic-appearing or diaphoretic.      Comments: Body mass index is 25.44 kg/m².     Cardiovascular:      Rate and Rhythm: Normal rate and regular rhythm.      Heart sounds: Normal heart sounds.   Pulmonary:      Effort: Pulmonary effort is normal. No respiratory distress.      Breath sounds: Normal breath sounds.   Neurological:      Mental Status: He is alert and oriented to person, place, and time.   Psychiatric:         Mood and Affect: Mood normal.         Behavior: Behavior normal.             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    Benign prostatic hyperplasia with urinary frequency 12/10/2020    Class 2 obesity due to excess calories without serious comorbidity with body mass index (BMI) of 35.0 to 35.9 in adult 05/07/2020    - discussed recommendation for diet and cardiovascular exercise - counseling on lifestyle modifications for risk factor reduction    Deafness in right ear     since birth    Elevated LFTs 01/04/2021    Lab Results Component Value Date  ALT 44 04/08/2022  AST 34 04/08/2022  ALKPHOS 79 04/08/2022  BILITOT 0.4 04/08/2022  - 7/10/2018 hepatitis C negative - 3/23/22 CT abd/pelvis: fatty liver disease normal - likely related with diet and lifestyle - pt denies alcohol use - discussed recommendation for diet and cardiovascular exercise - counseling on lifestyle modifications for risk factor reduction -    Epididymal cyst 04/19/2021    Noted on u/s dated 8/17/2016.     Fatty liver 04/12/2022    - 03/23/2022 CT abdomen pelvis:  Diffuse hepatic steatosis similar to prior exams. - 7/10/2018 hepatitis C negative Lab Results Component Value Date  ALT 44 04/08/2022  AST 34 04/08/2022  ALKPHOS 79 04/08/2022  BILITOT 0.4 04/08/2022  - discussed recommendation for diet and cardiovascular exercise - counseling on lifestyle modifications for risk factor reduction    Gastroesophageal reflux disease without esophagitis 01/30/2020    - followed by gastroenterology - 08/07/2020 EGD:  Small hiatal hernia.  Negative H pylori.  No dysplasia.  No esophagitis    Hiatal hernia with gastroesophageal reflux disease and esophagitis     History of peptic ulcer 07/10/2020    Intermittent explosive disorder 01/31/2020    Varicocele 08/17/2016       Past Surgical History:   Procedure Laterality Date    COLONOSCOPY N/A 1/12/2021    Procedure: COLONOSCOPY;  Surgeon: Valentina Patton MD;  Location: [facility];  Service: Endoscopy;  Laterality: N/A;    ENDOSCOPY  11/2019    ESOPHAGOGASTRODUODENOSCOPY N/A 8/7/2020     Procedure: EGD (ESOPHAGOGASTRODUODENOSCOPY);  Surgeon: Valentina Patton MD;  Location: [facility];  Service: Endoscopy;  Laterality: N/A;    IMPLANTATION OF BONE ANCHORED HEARING AID (BAHA) Right 11/21/2022    Procedure: INSERTION, BAHA;  Surgeon: Edu Mistry MD;  [facility];  Service: ENT;  Laterality: Right;    VASECTOMY  12/27/2019       Family History   Problem Relation Name Age of Onset    Heart disease Mother Lizz         aortic calcifications    Hyperlipidemia Mother Lizz     Diabetes Mother Lizz     Cancer Mother Lizz         lung cancer 55; thyroid cancer 55, colon cancer 55, lesion on the liver    Hypertension Mother Lizz     Lung cancer Mother Lizz     Hyperlipidemia Father Stefan     Heart disease Father Stefan         MI - age 30 - stents placed; CHF    Hypertension Father Stefan     Diabetes Father Stefan     Alcohol abuse Father Stefan     Heart disease Paternal Uncle  42        Heart Attack in 1999    Cancer Maternal Grandmother Aditi         Breast  cancer 2006    No Known Problems Maternal Grandfather      Prostate cancer Neg Hx      Kidney disease Neg Hx         Social History     Socioeconomic History    Marital status:     Number of children: 2   Occupational History     Employer: [employer]    Occupation: chemical    Tobacco Use    Smoking status: Never     Passive exposure: Past    Smokeless tobacco: Never   Substance and Sexual Activity    Alcohol use: Yes     Comment: rarely    Drug use: No    Sexual activity: Yes     Partners: Female     Social Drivers of Health     Financial Resource Strain: Low Risk  (2/12/2025)    Overall Financial Resource Strain (CARDIA)     Difficulty of Paying Living Expenses: Not hard at all   Food Insecurity: No Food Insecurity (2/12/2025)    Hunger Vital Sign     Worried About Running Out of Food in the Last Year: Never true     Ran Out of Food in the Last Year: Never true   Transportation Needs: No  Transportation Needs (2/12/2025)    PRAPARE - Transportation     Lack of Transportation (Medical): No     Lack of Transportation (Non-Medical): No   Physical Activity: Insufficiently Active (2/12/2025)    Exercise Vital Sign     Days of Exercise per Week: 3 days     Minutes of Exercise per Session: 30 min   Stress: No Stress Concern Present (2/12/2025)    [employer] - Occupational Stress Questionnaire     Feeling of Stress : Only a little   Housing Stability: Low Risk  (2/12/2025)    Housing Stability Vital Sign     Unable to Pay for Housing in the Last Year: No     Number of Times Moved in the Last Year: 0     Homeless in the Last Year: No

## 2025-05-13 DIAGNOSIS — F90.0 ATTENTION DEFICIT HYPERACTIVITY DISORDER (ADHD), PREDOMINANTLY INATTENTIVE TYPE: ICD-10-CM

## 2025-05-14 RX ORDER — METHYLPHENIDATE HYDROCHLORIDE 36 MG/1
36 TABLET ORAL EVERY MORNING
Qty: 30 TABLET | Refills: 0 | Status: SHIPPED | OUTPATIENT
Start: 2025-05-14 | End: 2025-06-15

## 2025-06-03 ENCOUNTER — OFFICE VISIT (OUTPATIENT)
Dept: URGENT CARE | Facility: CLINIC | Age: 35
End: 2025-06-03
Payer: COMMERCIAL

## 2025-06-03 VITALS
WEIGHT: 184 LBS | RESPIRATION RATE: 16 BRPM | DIASTOLIC BLOOD PRESSURE: 74 MMHG | HEIGHT: 72 IN | SYSTOLIC BLOOD PRESSURE: 116 MMHG | BODY MASS INDEX: 24.92 KG/M2 | HEART RATE: 69 BPM | TEMPERATURE: 98 F | OXYGEN SATURATION: 98 %

## 2025-06-03 DIAGNOSIS — J02.9 SORE THROAT: ICD-10-CM

## 2025-06-03 DIAGNOSIS — R05.9 COUGH, UNSPECIFIED TYPE: Primary | ICD-10-CM

## 2025-06-03 DIAGNOSIS — J06.9 VIRAL URI WITH COUGH: ICD-10-CM

## 2025-06-03 LAB
CTP QC/QA: YES
CTP QC/QA: YES
MOLECULAR STREP A: NEGATIVE
SARS-COV+SARS-COV-2 AG RESP QL IA.RAPID: NEGATIVE

## 2025-06-03 PROCEDURE — 99214 OFFICE O/P EST MOD 30 MIN: CPT | Mod: S$GLB,,, | Performed by: PHYSICIAN ASSISTANT

## 2025-06-03 PROCEDURE — 87651 STREP A DNA AMP PROBE: CPT | Mod: QW,S$GLB,, | Performed by: PHYSICIAN ASSISTANT

## 2025-06-03 PROCEDURE — 87811 SARS-COV-2 COVID19 W/OPTIC: CPT | Mod: QW,S$GLB,, | Performed by: PHYSICIAN ASSISTANT

## 2025-06-03 RX ORDER — ALBUTEROL SULFATE 90 UG/1
2 INHALANT RESPIRATORY (INHALATION) EVERY 4 HOURS PRN
Qty: 18 G | Refills: 0 | Status: SHIPPED | OUTPATIENT
Start: 2025-06-03 | End: 2026-06-03

## 2025-06-03 RX ORDER — BENZONATATE 100 MG/1
100 CAPSULE ORAL 3 TIMES DAILY PRN
Qty: 30 CAPSULE | Refills: 0 | Status: SHIPPED | OUTPATIENT
Start: 2025-06-03 | End: 2025-06-13

## 2025-06-03 RX ORDER — GUAIFENESIN AND DEXTROMETHORPHAN HYDROBROMIDE 1200; 60 MG/1; MG/1
1 TABLET, EXTENDED RELEASE ORAL EVERY 12 HOURS
Qty: 20 TABLET | Refills: 0 | Status: SHIPPED | OUTPATIENT
Start: 2025-06-03 | End: 2025-06-13

## 2025-06-06 ENCOUNTER — PATIENT MESSAGE (OUTPATIENT)
Dept: PSYCHIATRY | Facility: CLINIC | Age: 35
End: 2025-06-06
Payer: COMMERCIAL

## 2025-06-10 ENCOUNTER — OFFICE VISIT (OUTPATIENT)
Dept: PSYCHIATRY | Facility: CLINIC | Age: 35
End: 2025-06-10
Payer: COMMERCIAL

## 2025-06-10 ENCOUNTER — PATIENT MESSAGE (OUTPATIENT)
Dept: PSYCHIATRY | Facility: CLINIC | Age: 35
End: 2025-06-10
Payer: COMMERCIAL

## 2025-06-10 DIAGNOSIS — R45.4 DIFFICULTY CONTROLLING ANGER: ICD-10-CM

## 2025-06-10 DIAGNOSIS — F90.0 ATTENTION DEFICIT HYPERACTIVITY DISORDER (ADHD), PREDOMINANTLY INATTENTIVE TYPE: Primary | ICD-10-CM

## 2025-06-10 PROCEDURE — 98006 SYNCH AUDIO-VIDEO EST MOD 30: CPT | Mod: 95,,, | Performed by: NURSE PRACTITIONER

## 2025-06-10 RX ORDER — METHYLPHENIDATE HYDROCHLORIDE 54 MG/1
54 TABLET ORAL EVERY MORNING
Qty: 30 TABLET | Refills: 0 | Status: SHIPPED | OUTPATIENT
Start: 2025-06-14 | End: 2025-07-14

## 2025-06-10 RX ORDER — METHYLPHENIDATE HYDROCHLORIDE 54 MG/1
54 TABLET ORAL EVERY MORNING
Qty: 30 TABLET | Refills: 0 | Status: SHIPPED | OUTPATIENT
Start: 2025-08-10 | End: 2025-09-09

## 2025-06-10 RX ORDER — METHYLPHENIDATE HYDROCHLORIDE 54 MG/1
54 TABLET ORAL EVERY MORNING
Qty: 30 TABLET | Refills: 0 | Status: SHIPPED | OUTPATIENT
Start: 2025-07-12 | End: 2025-08-11

## 2025-06-10 NOTE — PROGRESS NOTES
"Outpatient Psychiatry Follow-Up Visit (MD/NP)    6/10/2025    Clinical Status of Patient:  Outpatient (Ambulatory)    Chief Complaint:  Marshal Baer is a 35 y.o. male who presents today for follow-up of mood disorder and attention problems.  Met with patient.      The patient location is: [place]   The chief complaint leading to consultation is: mood/ADHD    Visit type: audiovisual    Face to Face time with patient: 30 minutes   39 minutes of total time spent on the encounter, which includes face to face time and non-face to face time preparing to see the patient (eg, review of tests), Obtaining and/or reviewing separately obtained history, Documenting clinical information in the electronic or other health record, Independently interpreting results (not separately reported) and communicating results to the patient/family/caregiver, or Care coordination (not separately reported).     Each patient to whom he or she provides medical services by telemedicine is:  (1) informed of the relationship between the physician and patient and the respective role of any other health care provider with respect to management of the patient; and (2) notified that he or she may decline to receive medical services by telemedicine and may withdraw from such care at any time.    Notes:     Interval History and Content of Current Session:    Social/medical updates -- resides with wife and 2 children. Employed as .     "There's been more frequent outbursts of anger and betrayal in the relationship aspect."    Mood -- increased frequency of anger outbursts over the past 2 weeks. Notes that this is closely associated with interpersonal difficulties in his relationship with his wife. Notes ~ 5 verbal outbursts over the past 2 weeks. No physical aggression towards others. 1x broke phone following argument, typically punches bed/pillow, rarely punches himself. Maladaptive behaviors closely tied to cognitive " distortions, feelings of inadequacy, feelings of worthlessness. No thoughts of harming himself or others.   Anxiety -- no unregulated worry or panic  Attention -- notes breakthrough difficulty sustaining attention, easily distracted, forgetful. Has been able to implement some better systems for remembering and following through on tasks. Does feel that symptoms have an effect on his relationship with his wife.  Psychosis -- no  Sleep -- no change from baseline, dependent on shift work schedule  Energy -- adequate  Appetite -- no change from previous visit, continued weight loss with tirzepatide, now ~ 180 lb, at target weight    Substance use -- rare ETOH. Denies other substance use    Medications:    Concerta 36 mg daily -- compliant, denies SE, titrate to 54 mg daily     Previous medication trials: fluoxetine, escitalopram, Concerta/Ritalin    Brief synopsis:  Episodes of anger, some breakthrough inattentive sx, denies SI/HI/AVH      Review of Systems   PSYCHIATRIC: Pertinant items are noted in the narrative.  CONSTITUTIONAL: See above.   MUSCULOSKELETAL: No pain or stiffness of the joints.  NEUROLOGIC: No weakness, sensory changes, seizures, confusion, memory loss, tremor or other abnormal movements.  GASTROINTESTINAL: No nausea, vomiting, pain, constipation or diarrhea.    Past Medical, Family and Social History: The patient's past medical, family and social history have been reviewed and updated as appropriate within the electronic medical record - see encounter notes.    Compliance: see above    Side effects: see above    Risk Parameters:  Patient reports no suicidal ideation  Patient reports no homicidal ideation  Patient reports no self-injurious behavior  Patient reports no violent behavior    Exam (detailed: at least 9 elements; comprehensive: all 15 elements)   Constitutional  Vitals:  Most recent vital signs, dated less than 90 days prior to this appointment, were reviewed.   There were no vitals filed  for this visit.     General:  unremarkable, age appropriate     Musculoskeletal  Muscle Strength/Tone:  not examined, telemedicine    Gait & Station:  BRANDIN telemedicine      Psychiatric  Speech:  no latency; no press   Mood & Affect:  dysphoric  mood-congruent, tearful at times   Thought Process:  normal and logical   Associations:  intact   Thought Content:  normal, no suicidality, no homicidality, delusions, or paranoia   Insight:  intact   Judgement: behavior is adequate to circumstances   Orientation:  grossly intact   Memory: intact for content of interview   Language: grossly intact   Attention Span & Concentration:  able to focus   Fund of Knowledge:  intact and appropriate to age and level of education     Assessment and Diagnosis   Status/Progress: Based on the examination today, the patient's problem(s) is/are inadequately controlled.  New problems have been presented today.   Diagnostic uncertainty are complicating management of the primary condition.  The working differential for this patient includes see below.     General Impression: Marshal Baer is a 35 y.o. male with a psychiatric hx of intermittent explosive disorder and childhood diagnosed ADHD presenting with concerns regarding poor attention, poor emotional regulation, and difficulty controlling anger. Though patient does not meet full criteria for autism spectrum disorder, he presents with clinically significant symptoms of poor social-emotional reciprocity, and difficulty in understanding and maintaining relationships. Medical hx significant for asthma. Family MH hx is significant for substance/alcohol abuse in parents. Past psychiatric treatment includes treatment of ADHD with Ritalin/Concerta from 5th - 12th grade. No hx of psychiatric hospitalizations. No hx of suicide attempts. 1x episode of violence in 2008, charged with simple battery. No hx of substance abuse. Lives with wife and 2 children. Employed as .      03/10/25 -- inattentive sx improved since starting Concerta. Also notes improved ability to control anger, less easily frustrated. Continue Concerta 36 mg daily.    06/10/25 -- presents with fluctuating anger episodes largely dependent on interpersonal dynamics in relationship with his wife. No significant difficulty controlling anger in other settings (work, other family members, in public with random bystanders). Patient on STEP wait list. Also reporting some breakthrough inattentive sx. Will titrate Concerta to 54 mg daily.      ICD-10-CM ICD-9-CM   1. Attention deficit hyperactivity disorder (ADHD), predominantly inattentive type  F90.0 314.00   2. Difficulty controlling anger  R45.4 799.29         Intervention/Counseling/Treatment Plan   Reviewed patient's current symptoms and medication regimen  Medication changes as above  I decided to hold off on addition of medication for anger and poor impulse control  Appear to occur only in context of interpersonal difficulties with his wife  Would likely benefit greatly from psychotherapy (resources sent)  If needed can consider oxcarbazepine, valproic acid, or low dose atypical antipsychotic (risperidone)    Medication Management  Prescription drug management was employed during the encounter, as medications were prescribed, or considered but not prescribed.   [facility] reviewed  The risks and benefits of medication were discussed with the patient.  Possible expectable adverse effects of any current or proposed individual psychotropic agents were discussed with this patient.  Counseling was provided on the importance of full compliance with any prescribed medication.  Detailed instructions were provided to the patient regarding the administration of any prescribed medication.  The most common and rare side effects were reviewed, including discussion of potential permanent movement disorder and disfiguring conditions if applicable to any prescribed  medication  Patient voiced understanding    Return to Clinic: 3 months

## 2025-06-11 ENCOUNTER — PATIENT MESSAGE (OUTPATIENT)
Dept: OTOLARYNGOLOGY | Facility: CLINIC | Age: 35
End: 2025-06-11
Payer: COMMERCIAL

## 2025-06-11 ENCOUNTER — PATIENT MESSAGE (OUTPATIENT)
Dept: AUDIOLOGY | Facility: CLINIC | Age: 35
End: 2025-06-11
Payer: COMMERCIAL

## 2025-08-01 ENCOUNTER — OFFICE VISIT (OUTPATIENT)
Dept: PSYCHIATRY | Facility: CLINIC | Age: 35
End: 2025-08-01
Payer: COMMERCIAL

## 2025-08-01 ENCOUNTER — TELEPHONE (OUTPATIENT)
Dept: BEHAVIORAL HEALTH | Facility: CLINIC | Age: 35
End: 2025-08-01
Payer: COMMERCIAL

## 2025-08-01 ENCOUNTER — CLINICAL SUPPORT (OUTPATIENT)
Dept: AUDIOLOGY | Facility: CLINIC | Age: 35
End: 2025-08-01
Payer: COMMERCIAL

## 2025-08-01 ENCOUNTER — OFFICE VISIT (OUTPATIENT)
Dept: OTOLARYNGOLOGY | Facility: CLINIC | Age: 35
End: 2025-08-01
Payer: COMMERCIAL

## 2025-08-01 VITALS
BODY MASS INDEX: 24.34 KG/M2 | SYSTOLIC BLOOD PRESSURE: 117 MMHG | DIASTOLIC BLOOD PRESSURE: 80 MMHG | WEIGHT: 179.44 LBS | HEART RATE: 72 BPM

## 2025-08-01 DIAGNOSIS — H90.41 SENSORINEURAL HEARING LOSS (SNHL) OF RIGHT EAR WITH UNRESTRICTED HEARING OF LEFT EAR: Primary | ICD-10-CM

## 2025-08-01 DIAGNOSIS — R45.4 DIFFICULTY CONTROLLING ANGER: ICD-10-CM

## 2025-08-01 DIAGNOSIS — H91.92 DEAFNESS IN LEFT EAR: ICD-10-CM

## 2025-08-01 DIAGNOSIS — H90.A31 MIXED CONDUCTIVE AND SENSORINEURAL HEARING LOSS OF RIGHT EAR WITH RESTRICTED HEARING OF LEFT EAR: Primary | ICD-10-CM

## 2025-08-01 DIAGNOSIS — R41.840 POOR CONCENTRATION: ICD-10-CM

## 2025-08-01 DIAGNOSIS — F90.0 ATTENTION DEFICIT HYPERACTIVITY DISORDER (ADHD), PREDOMINANTLY INATTENTIVE TYPE: Primary | ICD-10-CM

## 2025-08-01 PROCEDURE — 99999 PR PBB SHADOW E&M-EST. PATIENT-LVL I: CPT | Mod: PBBFAC,,, | Performed by: AUDIOLOGIST

## 2025-08-01 PROCEDURE — 1159F MED LIST DOCD IN RCRD: CPT | Mod: CPTII,S$GLB,, | Performed by: OTOLARYNGOLOGY

## 2025-08-01 PROCEDURE — 99999 PR PBB SHADOW E&M-EST. PATIENT-LVL II: CPT | Mod: PBBFAC,,,

## 2025-08-01 PROCEDURE — 99999 PR PBB SHADOW E&M-EST. PATIENT-LVL I: CPT | Mod: PBBFAC,,,

## 2025-08-01 PROCEDURE — 99213 OFFICE O/P EST LOW 20 MIN: CPT | Mod: S$GLB,,, | Performed by: OTOLARYNGOLOGY

## 2025-08-01 PROCEDURE — 99999 PR PBB SHADOW E&M-EST. PATIENT-LVL II: CPT | Mod: PBBFAC,,, | Performed by: OTOLARYNGOLOGY

## 2025-08-01 RX ORDER — METHYLPHENIDATE HYDROCHLORIDE 36 MG/1
36 TABLET ORAL EVERY MORNING
Qty: 30 TABLET | Refills: 0 | Status: SHIPPED | OUTPATIENT
Start: 2025-09-01

## 2025-08-01 RX ORDER — METHYLPHENIDATE HYDROCHLORIDE 36 MG/1
36 TABLET ORAL EVERY MORNING
Qty: 30 TABLET | Refills: 0 | Status: SHIPPED | OUTPATIENT
Start: 2025-10-01

## 2025-08-01 RX ORDER — METHYLPHENIDATE HYDROCHLORIDE 36 MG/1
36 TABLET ORAL EVERY MORNING
Qty: 30 TABLET | Refills: 0 | Status: SHIPPED | OUTPATIENT
Start: 2025-08-01

## 2025-08-01 NOTE — PROGRESS NOTES
Outpatient Psychiatry Clinic Follow-up  [facility]  2025  Patient Name: Marshal Baer   : 1990       Source of information: Patient      History of Present Illness (HPI):     Marshal Baer is a 35 y.o. male who presents today for follow-up of mood disorder and attention problems.      Plan at last appointment on 06/10/2025:  Presents with fluctuating anger episodes largely dependent on interpersonal dynamics in relationship with his wife. No significant difficulty controlling anger in other settings (work, other family members, in public with random bystanders). Patient on [facility] wait list. Also reporting some breakthrough inattentive sx. Will titrate Concerta to 54 mg daily.       Current medication regimen:   Concerta 54mg daily    Interval History      Today, on exam, pt calm and cooperative with linear thought process. Pt attended appointment with wife. Pt states that since last visit with AYALA Mccoy, he has noticed some increased irritability and has experienced some passive SI with feelings of guilt. Pt states that he feels guilty of his frustration tolerance and impulsive outburst. Both pt and wife state that pt has not been violent toward others but will hit walls, destroy property, and bang his head against walls. Pt states that this has been ongoing since he was a child. Pt states that since learning of his potential autism last appointment, he has been seeing similar behaviors in his son who was also diagnosed with Autism recently. Pt states that he has difficulty concentrating, difficulty putting his thoughts into words, difficulty with frustration tolerance and impulsive behaviors, fails to complete tasks, and has racing thoughts. Pt states that these issues have been ongoing since childhood and feels that his lack of parental supervision may have contributed to this behavior. Pt states that he was often alone and often not home as his parents had him when they  "were older and did not really "parent him". Pt denies any obsessive/compulsive or ritualistic behaviors.     Missed [facility] program appointment.     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 6yr old diagnosed Autistic  Housing: [place]  Alcohol Use: Denies  Drug Use: Denies  Psychosocial stressors: Autistic child, interpersonal conflict with wife.    Past Psychiatric History:  Previous Diagnosis: ADHD  Previous Medication Trials: See above  Previous SA: Denies  Previous psychiatric hospitalizations: Denies      Psychiatric Review of Systems (ROS):     Depression: depressed mood     Felipa/hypomania: denies      Anxiety (OBDULIO and PA): denies current symptoms     Psychosis: denies current symptoms      PTSD: denies current symptoms     Eating Disorders: denies current symptoms      OCD: OCD Symptoms: denies current symptoms      ADHD: fails to give close attention to details or makes careless mistakes in school, work, or other activities, has difficulty sustaining attention in tasks or play activities, does not seem to listen when spoken to directly, has difficulty organizing tasks and activities, does not follow through on instructions and fails to finish schoolwork, chores, or duties in the workplace, is easily distracted by extraneous stimuli, and is often forgetful in daily activities    Sleep: denies current symptoms/complaints       Examination:       VITALS  Vitals:    08/01/25 1044   BP: 117/80   Pulse: 72   Weight: 81.4 kg (179 lb 7.3 oz)       RELEVANT LABS/STUDIES:    Lab Results   Component Value Date    WBC 5.09 07/30/2024    HGB 15.6 07/30/2024    HCT 46.7 07/30/2024 " "   MCV 89 07/30/2024     07/30/2024     BMP  Lab Results   Component Value Date     07/30/2024    K 4.9 07/30/2024     07/30/2024    CO2 28 07/30/2024    BUN 19 07/30/2024    CREATININE 0.9 07/30/2024    CALCIUM 9.5 07/30/2024    ANIONGAP 8 07/30/2024    ESTGFRAFRICA >60.0 04/08/2022    EGFRNONAA >60.0 04/08/2022     Lab Results   Component Value Date    ALT 39 07/30/2024    AST 27 07/30/2024    ALKPHOS 53 (L) 07/30/2024    BILITOT 1.0 07/30/2024     Lab Results   Component Value Date    TSH 1.512 07/30/2024     Lab Results   Component Value Date    HGBA1C 4.8 07/30/2024         PHYSICAL EXAM  General: well developed, well nourished  Neurologic:   Gait: Normal   Psychomotor signs:  No involuntary movements or tremor  AIMS: none    Risk Parameters:  Patient reports no suicidal ideation  Patient reports no homicidal ideation  Patient reports no self-injurious behavior  Patient reports no violent behavior      MENTAL STATUS EXAMINATION  General Appearance: appears stated age, well developed and nourished, adequately groomed and appropriately dressed, in no acute distress  Behavior: normal; cooperative; reasonably friendly, pleasant, and polite; appropriate eye-contact; under good behavioral control  Involuntary Movements and Motor Activity: no abnormal involuntary movements noted; no tics, no tremors, no akathisia, no dystonia, no evidence of tardive dyskinesia; no psychomotor agitation or retardation  Muscle Strength and Tone: intact  Gait and Station: intact, normal gait and station, ambulates without assistance  Speech: intact; normal rate, rhythm, volume, tone and pitch; conversational, spontaneous, and coherent  Language: intact; speaks and understands English fluently and proficiently; repeats words and phrases, no word finding difficulties are noted  Mood: " Good"  Affect: normal, euthymic, reactive, full-range, mood-congruent, appropriate to situation and context  Thought Process: intact, " linear, goal-directed, organized, logical  Associations: intact, no loosening of associations  Thought Content and Perceptions: no suicidal or homicidal ideation, no auditory or visual hallucinations, no paranoid ideation, no ideas of reference, no evidence of delusions or psychosis  Sensorium/Arousal: alert with clear sensorium  Orientation: intact; oriented fully to person, place, time and situation  Recent and Remote Memory: grossly intact, able to recall relevant and salient information from the recent and remote past  Attention and Concentration: grossly intact, attentive to the conversation and not readily distractible  Fund of Knowledge: grossly intact, used appropriate vocabulary and demonstrated an awareness of current events  Insight: intact, demonstrates awareness of illness and situation  Judgment: intact, behavior is adequate/appropriate to the circumstances, compliant with health provider's recommendations and instructions      Medical Decision Making - Assessment:     DIAGNOSIS:    ICD-10-CM ICD-9-CM   1. Attention deficit hyperactivity disorder (ADHD), predominantly inattentive type  F90.0 314.00   2. Difficulty controlling anger  R45.4 799.29   3. Poor concentration  R41.840 799.51         General Impression:  Pt presents with symptoms consistent of ADHD, OBDULIO, and Impulse Control Disorder. Per chart review, previous provider suspected patient did not meet full criteria for autism spectrum disorder, but suspected that the pt presented with clinically significant symptoms of poor social-emotional reciprocity, and difficulty in understanding and maintaining relationships. Through my assessment pt's symptoms appeared to be more associated with anxiety, ADHD, and impulse control disorder likely secondary to maladaptive coping mechanisms established during childhood from uncontrolled ADD and absent parenting. Will increase Effexor from 75mg daily to 150mg daily. Will consider starting Guanfacine for  impulse control at next visit. Pt missed [facility] therapy appointment and is attempting to reschedule.     Strengths and liabilities:  Strength: Patient accepts guidance/feedback, Strength: Patient is expressive/articulate., Strength: Patient is intelligent., Strength: Patient is motivated for change., Strength: Patient is physically healthy., Strength: Patient has positive support network., Strength: Patient has reasonable judgment., Strength: Patient is stable.  Protective factors: help-seeking/motivated for change, expressed reasons not to attempt (wife and children), intact reality testing, intact support system, no SI, no recent attempts, no substance use disorder, no chronic illness/pain, no access to firearms, age 26-59 y/o, and   Risk factors: , male, and impulsive        Medical Decision Making - Plan:     Decrease Concerta from 56mg to 36mg daily.  Consider starting Intuniv for impulse control  [place] PDMP reviewed:  no concern for misuse; filling Concerta as prescribed; no other controlled substances being filled  Discussed lifestyle modifications to reduce symptoms non-pharmacologically (ex: sleep, exercise, diet, etc).  Encouraged psychotherapy, patient expresses understanding missed appointment with [facility], attempting to establish care.   Counseled to limit substance use as drugs/alcohol can exacerbate psychiatric symptoms.  Discussed importance of sleep hygiene    Return to Clinic: 1 month or sooner if needed    Discussed with patient verbal informed consent including: risks and benefits of proposed treatment above vs. alternative treatments vs. no treatment, serious and common side effects of these respective treatments, as well as the inherent unpredictability of individual responses to any treatments, contingency plans if adverse reactions occur, precautions in which to me through Epic MyChart portal or call the clinic, and precautions in which to call 911 and/or go to the emergency room. The  patient expresses understanding of the above and displays the capacity to agree with this current plan. All questions were answered.    Total of 132 minutes spent today on encounter, including chart review,  review, seeing patient, documenting encounter, ordering medications.      Billing Documentation:     Method of Encounter IN PERSON visit at the clinic   Type of Encounter New patient to me, BUT seen by department within last 3 years   Counseling;  Psychotherapy Not applicable: Not done or UNDER 16 minutes   Counseling;  Tobacco and/or Nicotine Not applicable: Not done or UNDER 3 minutes   Time Remaining Chart/Pt 09490: NEW patient to me, SEEN BY DEPT WITHIN THREE YEARS, 54 minutes   Total Mins  (8/1/2025) TIME: I spent a total of 132 minutes on the day of the visit. This includes face to face time and non-face to face time preparing to see the patient (eg, review of tests), obtaining and/or reviewing separately obtained history, documenting clinical information in the electronic or other health record, independently interpreting results and communicating results to the patient/family/caregiver, or care coordinator.         Juan Jain MD  [facility], PGY-III  [facility]

## 2025-08-01 NOTE — PROGRESS NOTES
8/1/2025    Audiologic Evaluation    Marshal Baer was seen today in the clinic for an audiologic evaluation. Mr. Baer reported no perceived changes in hearing since his last audiogram in 2024. He reported he has been doing well with his Osia. Mr. Baer denied tinnitus, otalgia, aural fullness, and true vertigo.    Tympanometry revealed Type As in the right ear and Type A in the left ear.     Audiogram results revealed a severe to profound sensorineural hearing loss (SNHL) in the right ear and normal hearing sensitivity in the left ear.      A speech awareness threshold was noted at 90 dBHL in the right ear and a speech reception threshold was noted at 10 dBHL in the left ear.    Speech discrimination scores could not be obtained in the right ear and was 100% in the left ear.    Recommendations:  Otologic evaluation  Daily and consistent use of Osia  Osia follow-up with Dr. Cordon  Annual audiogram or sooner if change perceived  Hearing protection in noise

## 2025-08-01 NOTE — PROGRESS NOTES
Subjective     Patient ID: Marshal Baer is a 35 y.o. male.    Chief Complaint: Follow-up    Follow-up         Marshal Baer is a 35 y.o. 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  He is status post bone conducting implant, cochlear osia device in 2022.  He presents today for annual follow-up.  He reports no major issues with the device accept since undergoing a large amount of weight loss his glasses now seem to rub against the device irritating his skin some.     Review of Systems   Constitutional: Negative.    HENT: Negative.     Eyes: Negative.    Respiratory: Negative.     Cardiovascular: Negative.    Gastrointestinal: Negative.    Endocrine: Negative.    Genitourinary: Negative.    Musculoskeletal: Negative.    Integumentary:  Negative.   Allergic/Immunologic: Negative.    Neurological: Negative.    Hematological: Negative.    Psychiatric/Behavioral: Negative.            Objective     Physical Exam  Vitals and nursing note reviewed.   Constitutional:       Appearance: Normal appearance.   HENT:      Head: Normocephalic and atraumatic.        Right Ear: Tympanic membrane, ear canal and external ear normal. There is no impacted cerumen.      Left Ear: Tympanic membrane, ear canal and external ear normal. There is no impacted cerumen.   Neurological:      Mental Status: He is alert.       Assessment and Plan     Problem List Items Addressed This Visit    None  Visit Diagnoses         Mixed conductive and sensorineural hearing loss of right ear with restricted hearing of left ear    -  Primary      Deafness in left ear                Doing well with device   Discussed option of getting new glasses that hung the ear so as not to contact osia device.  F/u with audiology

## 2025-08-01 NOTE — PROGRESS NOTES
CHW received call from patient for [facility] intake appt. 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 Pt was asked to call his insurance company for a list of  providers just in case but pt is really interested in [facility] and would like an intake appt and would like to adjust his schedule to do the 12 weeks of therapy.

## 2025-08-01 NOTE — PROGRESS NOTES
Marshal Baer, a 35 y.o. male, was seen today for an Osia cleaning and check.  Pt reported that one of his Osia(s) is working well and the other does not transmit sound.  Processor(s) was cleaned and checked.   Processor(s) were wirelessly connected to programming software.  BC Direct and feedback test were completed and programming was updated to accommodate today's hearing test results.  Gain was reduced for the soft sounds in the working processor.  Pt reported good subjective benefit.  The broken processor was sent to Cochlear for possible repair.  Pt will follow up annually unless otherwise needed.    Bone Conduction Hearing Aid:  : Cochlear  Model: Osia   Side: Right  SN: [id number]    Send for repair: [id number]      Accessory: Mini Microphone        Recommendations:  Daily use of Osia  Follow up in one year  Contact office if programming issues arise  Contact Cochlear if device issues arise

## 2025-08-14 ENCOUNTER — OFFICE VISIT (OUTPATIENT)
Dept: FAMILY MEDICINE | Facility: CLINIC | Age: 35
End: 2025-08-14
Payer: COMMERCIAL

## 2025-08-14 VITALS
HEIGHT: 72 IN | DIASTOLIC BLOOD PRESSURE: 70 MMHG | TEMPERATURE: 98 F | OXYGEN SATURATION: 98 % | SYSTOLIC BLOOD PRESSURE: 104 MMHG | WEIGHT: 180 LBS | HEART RATE: 67 BPM | BODY MASS INDEX: 24.38 KG/M2

## 2025-08-14 DIAGNOSIS — H90.6 MIXED CONDUCTIVE AND SENSORINEURAL HEARING LOSS OF BOTH EARS: ICD-10-CM

## 2025-08-14 DIAGNOSIS — F63.81 INTERMITTENT EXPLOSIVE DISORDER: ICD-10-CM

## 2025-08-14 DIAGNOSIS — F90.0 ATTENTION DEFICIT HYPERACTIVITY DISORDER (ADHD), PREDOMINANTLY INATTENTIVE TYPE: ICD-10-CM

## 2025-08-14 DIAGNOSIS — K44.9 HIATAL HERNIA: ICD-10-CM

## 2025-08-14 DIAGNOSIS — Z87.11 HISTORY OF PEPTIC ULCER: ICD-10-CM

## 2025-08-14 DIAGNOSIS — J45.20 MILD INTERMITTENT ASTHMA WITHOUT COMPLICATION: ICD-10-CM

## 2025-08-14 DIAGNOSIS — Z86.39 HISTORY OF OBESITY: ICD-10-CM

## 2025-08-14 DIAGNOSIS — K76.0 FATTY LIVER: ICD-10-CM

## 2025-08-14 DIAGNOSIS — Z00.00 ANNUAL PHYSICAL EXAM: Primary | ICD-10-CM

## 2025-08-14 DIAGNOSIS — J31.0 CHRONIC RHINITIS: ICD-10-CM

## 2025-08-14 DIAGNOSIS — Z96.21 STATUS POST PLACEMENT OF BONE ANCHORED HEARING AID (BAHA): ICD-10-CM

## 2025-08-14 DIAGNOSIS — E66.3 OVERWEIGHT WITH BODY MASS INDEX (BMI) OF 25 TO 25.9 IN ADULT: ICD-10-CM

## 2025-08-14 DIAGNOSIS — N40.0 BENIGN PROSTATIC HYPERPLASIA WITHOUT LOWER URINARY TRACT SYMPTOMS: ICD-10-CM

## 2025-08-14 PROBLEM — R79.89 LOW TESTOSTERONE IN MALE: Status: RESOLVED | Noted: 2021-01-04 | Resolved: 2025-08-14

## 2025-08-14 PROCEDURE — 99999 PR PBB SHADOW E&M-EST. PATIENT-LVL IV: CPT | Mod: PBBFAC,,, | Performed by: NURSE PRACTITIONER

## 2025-08-14 RX ORDER — TIRZEPATIDE 5 MG/.5ML
5 INJECTION, SOLUTION SUBCUTANEOUS
Qty: 4 PEN | Refills: 2 | Status: SHIPPED | OUTPATIENT
Start: 2025-08-14

## (undated) DEVICE — SYR SLIP TIP 1CC

## (undated) DEVICE — MARKER SKIN STND TIP BLUE BARR

## (undated) DEVICE — GUIDE DRILL CONICAL TIP F/3

## (undated) DEVICE — DRAPE HALF SURGICAL 40X58IN

## (undated) DEVICE — Device

## (undated) DEVICE — TOWEL OR DISP STRL BLUE 4/PK

## (undated) DEVICE — SUT VICRYL 3-0 8-18 CP-2

## (undated) DEVICE — SUCTION SURGICAL STR 7FR

## (undated) DEVICE — KIT DRESS GLASSCK EAR ADLT ST

## (undated) DEVICE — SLEEVE ECLIPSE GOWN STRL 23IN

## (undated) DEVICE — SUT ETHILON 3-0 PS2 18 BLK

## (undated) DEVICE — BAND RUBBER ROYLAN YELLOW

## (undated) DEVICE — ELECTRODE REM PLYHSV RETURN 9

## (undated) DEVICE — KIT EAR EAOFE OMC

## (undated) DEVICE — SUT BONE WAX 2.5 GRMS 12/BX

## (undated) DEVICE — NDL HYPO REG 25G X 1 1/2

## (undated) DEVICE — PUNCH BIOPSY 6MM DERMAL DISP

## (undated) DEVICE — DRESSING XEROFORM FOIL PK 1X8

## (undated) DEVICE — ELECTRODE NEEDLE 2.8IN

## (undated) DEVICE — BIT DRILL COUNTERSINK 4MM

## (undated) DEVICE — PUNCH BIOPSY 4MM STERILE DISPO

## (undated) DEVICE — SUCTION SURGICAL FRAZR

## (undated) DEVICE — BLADE SURG #15 CARBON STEEL

## (undated) DEVICE — CLOSURE SKIN STERI STRIP 1/2X4